# Patient Record
Sex: MALE | Race: BLACK OR AFRICAN AMERICAN | Employment: UNEMPLOYED | URBAN - METROPOLITAN AREA
[De-identification: names, ages, dates, MRNs, and addresses within clinical notes are randomized per-mention and may not be internally consistent; named-entity substitution may affect disease eponyms.]

---

## 2017-03-01 PROBLEM — R35.1 NOCTURIA: Status: ACTIVE | Noted: 2017-03-01

## 2017-03-01 PROBLEM — N39.41 URGE INCONTINENCE: Status: ACTIVE | Noted: 2017-03-01

## 2017-03-01 PROBLEM — E29.1 HYPOGONADISM IN MALE: Status: ACTIVE | Noted: 2017-03-01

## 2018-06-08 ENCOUNTER — APPOINTMENT (OUTPATIENT)
Dept: LAB | Age: 63
End: 2018-06-08

## 2018-06-08 ENCOUNTER — HOSPITAL ENCOUNTER (OUTPATIENT)
Dept: LAB | Age: 63
Discharge: HOME | End: 2018-06-08

## 2018-06-08 PROCEDURE — 99001 SPECIMEN HANDLING PT-LAB: CPT | Performed by: INTERNAL MEDICINE

## 2018-10-04 ENCOUNTER — HOSPITAL ENCOUNTER (OUTPATIENT)
Dept: LAB | Age: 63
Discharge: HOME | End: 2018-10-04

## 2018-10-04 PROCEDURE — 99001 SPECIMEN HANDLING PT-LAB: CPT | Performed by: INTERNAL MEDICINE

## 2019-03-19 ENCOUNTER — HOSPITAL ENCOUNTER (OUTPATIENT)
Dept: LAB | Age: 64
Discharge: HOME | End: 2019-03-19

## 2019-03-19 LAB — XX-LABCORP SPECIMEN COL,LCBCF: NORMAL

## 2019-03-19 PROCEDURE — 99001 SPECIMEN HANDLING PT-LAB: CPT

## 2019-04-16 ENCOUNTER — OFFICE VISIT (OUTPATIENT)
Dept: UROLOGY | Age: 64
End: 2019-04-16

## 2019-04-16 VITALS
HEIGHT: 70 IN | BODY MASS INDEX: 27.49 KG/M2 | OXYGEN SATURATION: 98 % | SYSTOLIC BLOOD PRESSURE: 131 MMHG | WEIGHT: 192 LBS | DIASTOLIC BLOOD PRESSURE: 88 MMHG | HEART RATE: 83 BPM

## 2019-04-16 DIAGNOSIS — N52.9 ERECTILE DYSFUNCTION, UNSPECIFIED ERECTILE DYSFUNCTION TYPE: Primary | ICD-10-CM

## 2019-04-16 LAB
BILIRUB UR QL STRIP: NEGATIVE
GLUCOSE UR-MCNC: NEGATIVE MG/DL
KETONES P FAST UR STRIP-MCNC: NEGATIVE MG/DL
PH UR STRIP: 7 [PH] (ref 4.6–8)
PROT UR QL STRIP: NEGATIVE
SP GR UR STRIP: 1.01 (ref 1–1.03)
UA UROBILINOGEN AMB POC: NORMAL (ref 0.2–1)
URINALYSIS CLARITY POC: CLEAR
URINALYSIS COLOR POC: YELLOW
URINE BLOOD POC: NEGATIVE
URINE LEUKOCYTES POC: NEGATIVE
URINE NITRITES POC: NEGATIVE

## 2019-04-16 RX ORDER — CLONIDINE HYDROCHLORIDE 0.1 MG/1
TABLET ORAL 2 TIMES DAILY
COMMUNITY
End: 2022-07-19

## 2019-04-16 RX ORDER — FUROSEMIDE 20 MG/1
TABLET ORAL DAILY
COMMUNITY
End: 2020-07-21

## 2019-04-16 RX ORDER — PANTOPRAZOLE SODIUM 20 MG/1
20 TABLET, DELAYED RELEASE ORAL DAILY
COMMUNITY
End: 2020-05-06

## 2019-04-16 NOTE — PROGRESS NOTES
Mr. Bayron Lima has a reminder for a \"due or due soon\" health maintenance. I have asked that he contact his primary care provider for follow-up on this health maintenance.

## 2019-04-16 NOTE — PROGRESS NOTES
Lab Results   Component Value Date/Time    Prostate Specific Ag 0.79 06/27/2016 10:29 AM      [address]    Chief Complaint   Patient presents with   [facility]    Erectile Dysfunction       History and Physical    The patient is a pleasant 40-year-old -American male resents with erectile dysfunction. The patient advises that the problem has become progressive over the last 2 years. The patient began with normal libido but had difficulty with achieving and sustaining erection. The patient is now completely incapable of getting an erection and his libido has dropped. The patient has also noted a loss of energy loss of stamina loss of muscle mass. The patient denies any personal or family history of genitourinary issues. Review of the database reveals that the patient was found to have low free and total testosterone levels back in 2017. Apparently, AndroGel was attempted and did not work and the patient never received injectable testosterone.     Past Medical History:   Diagnosis Date    Arthritis     Benign hypertension     Coronary artery disease     Diabetes mellitus ([address]     Enlarged heart     Erectile dysfunction     Frequency of micturition     Hypogonadism in male     Impotence     Incomplete bladder emptying     Musculoskeletal pain     Nocturia     Urgency of micturition     Wrist joint pain      Patient Active Problem List   Diagnosis Code    Chest pain R07.9    Malignant hypertension I10    Syncope R55    Essential hypertension I10    Diabetes mellitus ([address] E11.9    Erectile dysfunction N52.9    Enlarged heart I51.7    Coronary artery disease I25.10    Musculoskeletal pain M79.18    Wrist joint pain M25.539    Arthritis M19.90    Benign hypertension I10    Impotence N52.9    Hypogonadism in male E29.1    Nocturia R35.1    Urge incontinence N39.41    Effusion of knee M25.469     Past Surgical History:   Procedure Laterality Date    HX CARPAL TUNNEL RELEASE      HX HERNIA REPAIR       Current Outpatient Medications   Medication Sig Dispense Refill    cloNIDine HCl (CATAPRES) 0.1 mg tablet Take  by mouth two (2) times a day.  pantoprazole (PROTONIX) 20 mg tablet Take 20 mg by mouth daily.  ammonium lactate (PILLO-HYDROLAC) 5 % lotion Apply  to affected area as needed.  furosemide (LASIX) 20 mg tablet Take  by mouth daily.  labetalol (NORMODYNE) 200 mg tablet TAKE 1 TABLET TWICE A DAY  2    NIFEdipine ER (ADALAT CC) 90 mg ER tablet TAKE 1 TABLET EVERY DAY  2    pioglitazone (ACTOS) 30 mg tablet TAKE 1 TABLET EVERY DAY  2    losartan-hydrochlorothiazide (HYZAAR) 100-25 mg per tablet Take 1 Tab by Mouth Once a Day.  sitaGLIPtin (JANUVIA) 100 mg tablet Take 100 mg by mouth daily.  polyethylene glycol (MIRALAX) 17 gram packet TAKE 17 G EVERY DAY BY ORAL ROUTE.  0    naproxen (NAPROSYN) 500 mg tablet TAKE 1 TABLET TWICE A DAY  0    TRILYTE WITH FLAVOR PACKETS 420 gram solution       metoprolol tartrate (LOPRESSOR) 50 mg tablet TAKE 1 TABLET(S) EVERY DAY BY ORAL ROUTE FOR 30 DAYS.  0    amoxicillin-clavulanate (AUGMENTIN) 500-125 mg per tablet TAKE 1 TABLET(S) EVERY 12 HOURS BY ORAL ROUTE FOR 10 DAYS.  0    testosterone cypionate (DEPOTESTOTERONE CYPIONATE) 200 mg/mL injection 1 mL by IntraMUSCular route every seven (7) days. Max Daily Amount: 200 mg. Fax to [facility] @ [phone] 4 Vial 5    Syringe with Needle, Disp, (SYRINGE 3CC/21GX1\") 3 mL 21 gauge x 1\" syrg 1 Syringe by Does Not Apply route every seven (7) days.  4 Syringe 5    OTHER,NON-FORMULARY, TRI-MIX 60 MCGS PGE/30MG PAPAVERINE/2 MG REGITINE/ML 5 Each 5    Syringe with Needle, Disp, (ALLERGY SYRINGE) 1 mL 27 x 1/2\" syrg For use with Intracavernosal Trimix injections 25 Pen Needle 2    simvastatin (ZOCOR) 20 mg tablet 20 mg.      tamsulosin (FLOMAX) 0.4 mg capsule 0.4 mg.      oxyCODONE-acetaminophen (PERCOCET 7.5) 7.5-325 mg per tablet TAKE 1 TABLET BY MOUTH FOUR TIMES DAILY, AS NEEDED, FOR 30 DAYS  0    testosterone (ANDROGEL) 20.25 mg/1.25 gram (1.62 %) gel Apply 2 Sprays to affected area daily. Max Daily Amount: 40.5 mg. 1 Bottle 5    amLODIPine (NORVASC) 10 mg tablet Take  by mouth daily.  insulin detemir (LEVEMIR FLEXTOUCH) 100 unit/mL (3 mL) inpn by SubCUTAneous route.  metFORMIN (GLUCOPHAGE) 1,000 mg tablet Take 1,000 mg by mouth two (2) times daily (with meals).  traMADol (ULTRAM) 50 mg tablet Take 50 mg by mouth every six (6) hours as needed for Pain.        Allergies   Allergen Reactions    Aspirin Anaphylaxis     Fatal    Lisinopril Cough     Social History     Socioeconomic History    Marital status: UNKNOWN     Spouse name: Not on file    Number of children: Not on file    Years of education: Not on file    Highest education level: Not on file   Occupational History    Not on file   Social Needs    Financial resource strain: Not on file    Food insecurity:     Worry: Not on file     Inability: Not on file    Transportation needs:     Medical: Not on file     Non-medical: Not on file   Tobacco Use    Smoking status: Former Smoker     Packs/day: 0.50     Years: 5.00     Pack years: 2.50     Types: Cigarettes     Last attempt to quit: 1976     Years since quittin.3    Smokeless tobacco: Never Used   Substance and Sexual Activity    Alcohol use: Yes     Comment: occassionally    Drug use: No    Sexual activity: Not Currently   Lifestyle    Physical activity:     Days per week: Not on file     Minutes per session: Not on file    Stress: Not on file   Relationships    Social connections:     Talks on phone: Not on file     Gets together: Not on file     Attends [employer] service: Not on file     Active member of club or organization: Not on file     Attends meetings of clubs or organizations: Not on file     Relationship status: Not on file    Intimate partner violence:     Fear of current or ex partner: Not on file Emotionally abused: Not on file     Physically abused: Not on file     Forced sexual activity: Not on file   Other Topics Concern    Not on file   Social History Narrative    Not on file      Family History   Problem Relation Age of Onset    Diabetes Mother     Hypertension Mother     Heart Failure Mother     High Cholesterol Mother     Stroke Mother     Other Mother         vision problems    Diabetes Father     Hypertension Father     Diabetes Sister     Hypertension Sister     No Known Problems Sister     No Known Problems Brother     HIV/AIDS Sister     Diabetes Maternal Uncle     Hypertension Maternal Uncle                  Visit Vitals  /88 (BP 1 Location: Left arm, BP Patient Position: Supine)   Pulse 83   Ht 5' 10\" (1.778 m)   Wt 192 lb (87.1 kg)   SpO2 98%   BMI 27.55 kg/m²     Physical        Gen: WDWN adult NAD normal male body habitus and hair distribution with no gynecomastia  Head  : normocephalic,  Normal ROM; eyes with normal pupils, EOMs, no masses;  conjunctiva normal  Neck: normal movement,  no evident mass,  No evident adenopathy, trachea midline,  Lungs: no respiratory distress or difficulties  CV:  No evident peripheral swelling  Abd :bowel sounds normal, no masses, tenderness, organomegaly  Flanks   negative to punch percussion  -penis is circumcised and normal.  Scrotal contents reveal normal size and texture of testes. There is an old hernia repair scar on the right side without recurrence. Rectal tone is normal.  Prostate is 20 g smooth and benign    Extremities- no edema, arthritis, deformity, swelling dorsalis pedis and posterior tibial pulses are normal.  Femoral pulses are slightly reduced on the left side but there is no evidence of a bruit.   There is no abdominal bruit  Psych- oriented, no evident anxiety, no cognitive impairment evident    Urine analysis is unremarkable                  Impression/ PLAN  Loss of libido and erectile capability in a patient with laboratory confirmation of hypogonadism a couple of years ago    Plan: Will draw PSA and get a panel that includes free and total testosterone, prolactin, estradiol, luteinizing hormone, follicle-stimulating hormone    1 week after that, we will recheck a free and total testosterone to fulfill criteria for hypogonadism. These will be venipunctures accomplished in the morning. The patient will return after that for discussion            This visit exceeded 30 minutes and >50% was counselling  The patient understands the discussion and plan    PLEASE NOTE:      This document has been produced using voice recognition software.   Unrecognized errors in transcription may be present    Rodrigue Rosario MD

## 2019-04-16 NOTE — PATIENT INSTRUCTIONS
Erectile Dysfunction: Care Instructions  Your Care Instructions    A man has erectile dysfunction (ED) when he routinely can't get or keep an erection that allows satisfactory sex. He may not be able to have an erection at any time. Or he may not be able to have one that is firm enough or lasts long enough to complete intercourse. ED is not the same as having trouble getting an erection now and then. That's common. It happens to most men at some time. ED can be caused by problems with the blood vessels, nerves, or hormones. It can be caused by diabetes, heart disease, and injuries. Nerve disorders, such as multiple sclerosis or Parkinson's disease, can also cause it. ED can also be caused by medicines, alcohol, and tobacco. Or it may be caused by depression, stress, grief, or relationship problems. Follow-up care is a key part of your treatment and safety. Be sure to make and go to all appointments, and call your doctor if you are having problems. It's also a good idea to know your test results and keep a list of the medicines you take. How can you care for yourself at home?   Lifestyle    · Limit alcohol. Have no more than 2 drinks a day.     · Do not smoke. Smoking makes it harder for the blood vessels in the penis to relax and let blood flow in. If you need help quitting, talk to your doctor about stop-smoking programs and medicines. These can increase your chances of quitting for good.     · Do not use cocaine, heroin, or other illegal drugs.     · Try to reduce stress.     · Give yourself time to adjust to change. Changes in your job, family, relationships, home life, and other areas can cause stress. And stress can cause erection problems.    Work with your partner    · Don't assume that you know what your partner likes when it comes to sex. You may be wrong. Talk about what each of you does and does not enjoy.     · Make time outside of the bedroom to talk about your sex life.  If you avoid sex because you are afraid of having erection problems, your partner may worry that you are no longer interested.     · If you and your partner have trouble talking about sex, see a therapist who can help you talk about it. Reading books with your partner about sexual health may also help.     · Relax. Take time for more foreplay. Worrying about your erections may only make things worse. Medicines    · Tell your doctor about all the medicines that you take. ? Some medicines can cause erection problems. ? Some medicines can have dangerous interactions with medicines that are prescribed for ED, including over-the-counter medicines and herbal products.     · Be safe with medicines. Take your medicines exactly as prescribed. Call your doctor if you think you are having a problem with your medicine.     · Talk to your doctor about trying a medicine to help you keep an erection. This could be a medicine such as Viagra, Levitra, or Cialis. If you have a heart problem, ask your doctor if these are safe for you. Do not take these medicines if you take nitroglycerin or other nitrate medicine. When should you call for help? Call your doctor now or seek immediate medical care if:    · You took a medicine for erectile dysfunction and you have an erection that lasts longer than 3 hours.    Watch closely for changes in your health, and be sure to contact your doctor if you have any problems. Where can you learn more? Go to http://carlos-brionna.info/. Enter [id number] [id number] in the search box to learn more about \"Erectile Dysfunction: Care Instructions. \"  Current as of: September 26, 2018  Content Version: 11.9  © 4459-3502 Healthwise, Incorporated. Care instructions adapted under license by [employer] (which disclaims liability or warranty for this information).  If you have questions about a medical condition or this instruction, always ask your healthcare professional. [address] any warranty or liability for your use of this information.

## 2019-04-17 DIAGNOSIS — N52.9 ERECTILE DYSFUNCTION, UNSPECIFIED ERECTILE DYSFUNCTION TYPE: ICD-10-CM

## 2019-04-18 ENCOUNTER — HOSPITAL ENCOUNTER (OUTPATIENT)
Dept: LAB | Age: 64
Discharge: HOME | End: 2019-04-18

## 2019-04-18 LAB
XX-LABCORP SPECIMEN COL,LCBCF: NORMAL
XX-LABCORP SPECIMEN COL,LCBCF: NORMAL

## 2019-04-18 PROCEDURE — 99001 SPECIMEN HANDLING PT-LAB: CPT

## 2019-04-20 LAB
ESTRADIOL SERPL-MCNC: 33.5 PG/ML (ref 7.6–42.6)
FSH SERPL-ACNC: 15.2 MIU/ML (ref 1.5–12.4)
LH SERPL-ACNC: 24.5 MIU/ML (ref 1.7–8.6)
PROLACTIN SERPL-MCNC: 10.7 NG/ML (ref 4–15.2)
PSA SERPL-MCNC: 1.9 NG/ML (ref 0–4)
SPECIMEN STATUS REPORT, ROLRST: NORMAL
TESTOST FREE SERPL-MCNC: 5.1 PG/ML (ref 6.6–18.1)
TESTOST SERPL-MCNC: 450 NG/DL (ref 264–916)

## 2019-04-26 ENCOUNTER — DOCUMENTATION ONLY (OUTPATIENT)
Dept: UROLOGY | Age: 64
End: 2019-04-26

## 2019-04-26 NOTE — PROGRESS NOTES
Patient called and wanted his lab results . I told him Dr. Erick Ojeda has not seen them and I was unable to give those results. I told him when Erick Hess comes back I will ask if results can be given.  Patient understood

## 2019-05-13 ENCOUNTER — OFFICE VISIT (OUTPATIENT)
Dept: UROLOGY | Age: 64
End: 2019-05-13

## 2019-05-13 VITALS
HEART RATE: 76 BPM | BODY MASS INDEX: 27.49 KG/M2 | OXYGEN SATURATION: 93 % | WEIGHT: 192 LBS | HEIGHT: 70 IN | DIASTOLIC BLOOD PRESSURE: 58 MMHG | SYSTOLIC BLOOD PRESSURE: 90 MMHG

## 2019-05-13 DIAGNOSIS — N52.9 ERECTILE DYSFUNCTION, UNSPECIFIED ERECTILE DYSFUNCTION TYPE: Primary | ICD-10-CM

## 2019-05-13 NOTE — PROGRESS NOTES
Lab Results Component Value Date/Time  
 Prostate Specific Ag 1.9 04/18/2019 12:00 AM  
 Prostate Specific Ag 0.79 06/27/2016 10:29 AM  
 NARRATIVE: 
 
She is a 68-year-old -American male who presented with loss of libido, erectile dysfunction, and some suggestion of loss of energy stamina and muscle mass. However, his primary difficulty is his sexual dysfunction. The patient reported a history of low free and total testosterone in 2017 patient failed with AndroGel and testosterone shots were not given. The patient comes now to review his laboratory data Urinalysis is negative PSA acceptable at 1.9 The patient has slight elevation in his follicle-stimulating hormone and significant elevation in his luteinizing hormone but the testosterone done by morning venipuncture was acceptable at 450. However the free testosterone was somewhat low at 5.1. Estradiol levels are normal 
 
 
 
 
 
 
 
 
 
IMPRESSION: 
The patient's laboratory work shows a normal testosterone in the presence of elevated luteinizing hormone but with a low free testosterone his major issue is sexual function and the patient apparently has had some experience or discussion regarding penile injections and urethral suppositories PLAN: 
I am going to check a sex hormone binding globulin level and the patient may need endocrinology evaluation to help with this and also with his diabetes that may be complicating the issue. I have also talked with him about the alternative therapies including injections and urethral suppositories but the patient states that he had had success in the past with Cialis so I am giving him a paper prescription for Cialis 20 mg and alternative access is discussed with the patient The patient will return in 1 month and we will review his success with that medication and his blood work and see whether not we need to proceed with endocrinology evaluation This visit exceeded 15 minutes and greater than 50% was counseling. The patient expresses understanding of the treatment plan and wishes to proceed This dictation used voice recognition software and there may be mistakes.  
 
Lilia Blackwell MD

## 2019-05-13 NOTE — PROGRESS NOTES
Mr. Zoie Neville has a reminder for a \"due or due soon\" health maintenance. I have asked that he contact his primary care provider for follow-up on this health maintenance.

## 2019-05-14 LAB
BILIRUB UR QL STRIP: NEGATIVE
GLUCOSE UR-MCNC: NEGATIVE MG/DL
KETONES P FAST UR STRIP-MCNC: NEGATIVE MG/DL
PH UR STRIP: 5.5 [PH] (ref 4.6–8)
PROT UR QL STRIP: NEGATIVE
SHBG SERPL-SCNC: 79.4 NMOL/L (ref 19.3–76.4)
SP GR UR STRIP: 1.02 (ref 1–1.03)
UA UROBILINOGEN AMB POC: NORMAL (ref 0.2–1)
URINALYSIS CLARITY POC: CLEAR
URINALYSIS COLOR POC: YELLOW
URINE BLOOD POC: NEGATIVE
URINE LEUKOCYTES POC: NEGATIVE
URINE NITRITES POC: NEGATIVE

## 2019-07-16 ENCOUNTER — OFFICE VISIT (OUTPATIENT)
Dept: UROLOGY | Age: 64
End: 2019-07-16

## 2019-07-16 VITALS
OXYGEN SATURATION: 96 % | SYSTOLIC BLOOD PRESSURE: 162 MMHG | HEIGHT: 70 IN | WEIGHT: 193 LBS | BODY MASS INDEX: 27.63 KG/M2 | HEART RATE: 85 BPM | DIASTOLIC BLOOD PRESSURE: 84 MMHG

## 2019-07-16 DIAGNOSIS — N52.9 ERECTILE DYSFUNCTION, UNSPECIFIED ERECTILE DYSFUNCTION TYPE: Primary | ICD-10-CM

## 2019-07-16 LAB
BILIRUB UR QL STRIP: NEGATIVE
GLUCOSE UR-MCNC: NEGATIVE MG/DL
KETONES P FAST UR STRIP-MCNC: NEGATIVE MG/DL
PH UR STRIP: 6 [PH] (ref 4.6–8)
PROT UR QL STRIP: NEGATIVE
SP GR UR STRIP: 1.01 (ref 1–1.03)
UA UROBILINOGEN AMB POC: NORMAL (ref 0.2–1)
URINALYSIS CLARITY POC: CLEAR
URINALYSIS COLOR POC: YELLOW
URINE BLOOD POC: NEGATIVE
URINE LEUKOCYTES POC: NEGATIVE
URINE NITRITES POC: NEGATIVE

## 2019-07-16 NOTE — PATIENT INSTRUCTIONS
Erectile Dysfunction: Care Instructions  Your Care Instructions    A man has erectile dysfunction (ED) when he routinely can't get or keep an erection that allows satisfactory sex. He may not be able to have an erection at any time. Or he may not be able to have one that is firm enough or lasts long enough to complete intercourse. ED is not the same as having trouble getting an erection now and then. That's common. It happens to most men at some time. ED can be caused by problems with the blood vessels, nerves, or hormones. It can be caused by diabetes, heart disease, and injuries. Nerve disorders, such as multiple sclerosis or Parkinson's disease, can also cause it. ED can also be caused by medicines, alcohol, and tobacco. Or it may be caused by depression, stress, grief, or relationship problems. Follow-up care is a key part of your treatment and safety. Be sure to make and go to all appointments, and call your doctor if you are having problems. It's also a good idea to know your test results and keep a list of the medicines you take. How can you care for yourself at home?   Lifestyle    · Limit alcohol. Have no more than 2 drinks a day.     · Do not smoke. Smoking makes it harder for the blood vessels in the penis to relax and let blood flow in. If you need help quitting, talk to your doctor about stop-smoking programs and medicines. These can increase your chances of quitting for good.     · Do not use cocaine, heroin, or other illegal drugs.     · Try to reduce stress.     · Give yourself time to adjust to change. Changes in your job, family, relationships, home life, and other areas can cause stress. And stress can cause erection problems.    Work with your partner    · Don't assume that you know what your partner likes when it comes to sex. You may be wrong. Talk about what each of you does and does not enjoy.     · Make time outside of the bedroom to talk about your sex life.  If you avoid sex because you are afraid of having erection problems, your partner may worry that you are no longer interested.     · If you and your partner have trouble talking about sex, see a therapist who can help you talk about it. Reading books with your partner about sexual health may also help.     · Relax. Take time for more foreplay. Worrying about your erections may only make things worse. Medicines    · Tell your doctor about all the medicines that you take. ? Some medicines can cause erection problems. ? Some medicines can have dangerous interactions with medicines that are prescribed for ED, including over-the-counter medicines and herbal products.     · Be safe with medicines. Take your medicines exactly as prescribed. Call your doctor if you think you are having a problem with your medicine.     · Talk to your doctor about trying a medicine to help you keep an erection. This could be a medicine such as Viagra, Levitra, or Cialis. If you have a heart problem, ask your doctor if these are safe for you. Do not take these medicines if you take nitroglycerin or other nitrate medicine. When should you call for help? Call your doctor now or seek immediate medical care if:    · You took a medicine for erectile dysfunction and you have an erection that lasts longer than 3 hours.    Watch closely for changes in your health, and be sure to contact your doctor if you have any problems. Where can you learn more? Go to http://carlos-brionna.info/. Enter [id number] in the search box to learn more about \"Erectile Dysfunction: Care Instructions. \"  Current as of: September 26, 2018  Content Version: 11.9  © 2203-4379 Healthwise, Incorporated. Care instructions adapted under license by [employer] (which disclaims liability or warranty for this information).  If you have questions about a medical condition or this instruction, always ask your healthcare professional. [address] any warranty or liability for your use of this information.

## 2019-07-16 NOTE — PROGRESS NOTES
Mr. Damon Hernandez has a reminder for a \"due or due soon\" health maintenance. I have asked that he contact his primary care provider for follow-up on this health maintenance.

## 2019-07-16 NOTE — PROGRESS NOTES
Lab Results   Component Value Date/Time    Prostate Specific Ag 1.9 04/18/2019 12:00 AM    Prostate Specific Ag 0.79 06/27/2016 10:29 AM    NARRATIVE:    Patient is a 43-year-old -American male who is being evaluated for erectile dysfunction. The patient has a history of reportedly low testosterone levels and failed with AndroGel. Initial laboratory evaluation showed an elevation in his luteinizing hormone but the testosterone level was acceptable with a low free testosterone. I obtained a sex hormone binding globulin level that was only modestly elevated. The patient has again tried Cialis and has had absolutely no benefit from it and states that his problem is that he is not able to even achieve a full erection much less sustain it            urinalysis is negative        IMPRESSION:  Erectile dysfunction with normal testosterone levels but with elevated luteinizing hormone levels  Failure with Cialis      PLAN:  I have advised the patient that I do not believe the testosterone replacement therapy would provide any benefit for him. While the luteinizing hormone elevation is present, the patient does have a normal testosterone level and I do not currently see where testosterone replacement would be of any benefit. The patient has researched his alternatives and we will try Alden suppositories and have also provided him with literature on vacuum erection device along with full discussion with use and about how to use it to rehabilitate prior to using. The patient is also research the idea about injection therapy and I have suggested that the patient might want to come back in and see Dr. Shania Albert about that should the vacuum erection device and the Alden not provide him with satisfaction  The patient is advised to continue getting his annual prostate surveillance    This visit exceeded 25 minutes and greater than 50% was counseling.   The patient expresses understanding of the treatment plan and wishes to proceed    This dictation used voice recognition software and there may be mistakes.     Ashley Reynaga MD

## 2019-09-17 ENCOUNTER — HOSPITAL ENCOUNTER (OUTPATIENT)
Dept: LAB | Age: 64
Discharge: HOME | End: 2019-09-17

## 2019-09-17 LAB — XX-LABCORP SPECIMEN COL,LCBCF: NORMAL

## 2019-09-17 PROCEDURE — 99001 SPECIMEN HANDLING PT-LAB: CPT

## 2019-12-22 ENCOUNTER — HOSPITAL ENCOUNTER (EMERGENCY)
Age: 64
Discharge: HOME | End: 2019-12-22
Attending: EMERGENCY MEDICINE
Payer: MEDICAID

## 2019-12-22 ENCOUNTER — APPOINTMENT (OUTPATIENT)
Dept: GENERAL RADIOLOGY | Age: 64
End: 2019-12-22
Attending: PHYSICIAN ASSISTANT
Payer: MEDICAID

## 2019-12-22 VITALS
BODY MASS INDEX: 26.92 KG/M2 | TEMPERATURE: 98.9 F | WEIGHT: 188 LBS | SYSTOLIC BLOOD PRESSURE: 172 MMHG | HEART RATE: 96 BPM | HEIGHT: 70 IN | RESPIRATION RATE: 16 BRPM | OXYGEN SATURATION: 97 % | DIASTOLIC BLOOD PRESSURE: 92 MMHG

## 2019-12-22 DIAGNOSIS — R05.9 COUGH: ICD-10-CM

## 2019-12-22 DIAGNOSIS — I10 ESSENTIAL HYPERTENSION: ICD-10-CM

## 2019-12-22 DIAGNOSIS — J06.9 UPPER RESPIRATORY TRACT INFECTION, UNSPECIFIED TYPE: Primary | ICD-10-CM

## 2019-12-22 LAB
FLUAV AG NPH QL IA: NEGATIVE
FLUBV AG NOSE QL IA: NEGATIVE

## 2019-12-22 PROCEDURE — 87804 INFLUENZA ASSAY W/OPTIC: CPT

## 2019-12-22 PROCEDURE — 99283 EMERGENCY DEPT VISIT LOW MDM: CPT

## 2019-12-22 PROCEDURE — 74011250637 HC RX REV CODE- 250/637: Performed by: PHYSICIAN ASSISTANT

## 2019-12-22 PROCEDURE — 71046 X-RAY EXAM CHEST 2 VIEWS: CPT

## 2019-12-22 RX ORDER — CLONIDINE HYDROCHLORIDE 0.1 MG/1
0.1 TABLET ORAL
Status: DISCONTINUED | OUTPATIENT
Start: 2019-12-22 | End: 2019-12-22

## 2019-12-22 RX ORDER — CLONIDINE HYDROCHLORIDE 0.1 MG/1
0.2 TABLET ORAL
Status: COMPLETED | OUTPATIENT
Start: 2019-12-22 | End: 2019-12-22

## 2019-12-22 RX ORDER — LABETALOL 100 MG/1
200 TABLET, FILM COATED ORAL
Status: COMPLETED | OUTPATIENT
Start: 2019-12-22 | End: 2019-12-22

## 2019-12-22 RX ORDER — AMLODIPINE BESYLATE 5 MG/1
10 TABLET ORAL
Status: COMPLETED | OUTPATIENT
Start: 2019-12-22 | End: 2019-12-22

## 2019-12-22 RX ORDER — BENZONATATE 100 MG/1
100 CAPSULE ORAL
Qty: 20 CAP | Refills: 0 | Status: SHIPPED | OUTPATIENT
Start: 2019-12-22 | End: 2019-12-29

## 2019-12-22 RX ADMIN — LABETALOL HYDROCHLORIDE 200 MG: 100 TABLET, FILM COATED ORAL at 22:00

## 2019-12-22 RX ADMIN — CLONIDINE HYDROCHLORIDE 0.2 MG: 0.1 TABLET ORAL at 21:59

## 2019-12-22 RX ADMIN — AMLODIPINE BESYLATE 10 MG: 5 TABLET ORAL at 22:00

## 2019-12-23 NOTE — ED TRIAGE NOTES
Patient comes in stating that he has been feeling pretty bad for about a week, denies any vomiting or diarrhea, states that he has been fatigued. Patient also states that he has been coughing and having chills.

## 2019-12-23 NOTE — ED PROVIDER NOTES
EMERGENCY DEPARTMENT HISTORY AND PHYSICAL EXAM    Date: 12/22/2019  Patient Name: Jennifer Garces    History of Presenting Illness     Chief Complaint   Patient presents with    Cough    Fatigue         History Provided By: Patient    Chief Complaint: cough  Duration: 1 Weeks  Timing:  Gradual  Location: chest  Quality: Aching  Severity: Mild  Modifying Factors: none  Associated Symptoms: fatigue, chills      HPI: Jennifer Garces is a 75 y.o. male with a PMH of Diabetes, impotence, hypertension, arthritis, CAD, nocturia who presents to the ER complaining of cough, fatigue and chills. Patient states his symptoms began about 1 week ago and continued to persist.  He has been taking NyQuil frequently for his symptoms with no relief. Patient reports a productive cough with green phlegm. Denied any associated fevers. He did get the flu vaccine this season however did not get the pneumonia vaccine. He denies feeling short of breath or having chest pain. He has no other symptoms or complaints. Patient reports he has not taken his blood pressure medication today. PCP: Beena Valenzuela MD    Current Outpatient Medications   Medication Sig Dispense Refill    benzonatate (TESSALON PERLES) 100 mg capsule Take 1 Cap by mouth three (3) times daily as needed for Cough for up to 7 days. 20 Cap 0    LANTUS SOLOSTAR U-100 INSULIN 100 unit/mL (3 mL) inpn INJECT 40 UNIT(S) EVERY DAY BY SUBCUTANEOUS ROUTE.  0    insulin glargine (LANTUS SOLOSTAR U-100 INSULIN) 100 unit/mL (3 mL) inpn Lantus Solostar U-100 Insulin 100 unit/mL (3 mL) subcutaneous pen      Diabetic Supplies, Miscellan. (INJECT-EASE AUTOMATIC INJECTOR) [facility] FOR USE WITH INTRACAVERNOSAL INJECTIONS. 1 Each 1    tadalafil (CIALIS) 5 mg tablet Take 1 Tab by mouth daily. 30 Tab 5    OTHER,NON-FORMULARY, 0.5 mL by IntraCAVernosal route as needed (For ED). Please dispense a 5 ml amount of of Trimix consisting of PGE 60 mcg/Papaverine 30 mg/Phentolamine 2 mg per ml.   Patient is to bring to office for dosing 5 Each 5    Syringe with Needle,Disp, Tray (ALLERGIST TRAY 1CC 27GX1/2\") 1 mL 27 x 1/2\" tray 1 Each by Does Not Apply route as needed (For ED). Please dispense one tray of allergy syringes. To be used as directed. 25 Each 5    CIALIS 5 mg tablet Take 1 Tab by mouth daily. For ED. Please dispense through compounding pharmacy. 90 Tab 3    cloNIDine HCl (CATAPRES) 0.1 mg tablet Take  by mouth two (2) times a day.  pantoprazole (PROTONIX) 20 mg tablet Take 20 mg by mouth daily.  furosemide (LASIX) 20 mg tablet Take  by mouth daily.  polyethylene glycol (MIRALAX) 17 gram packet TAKE 17 G EVERY DAY BY ORAL ROUTE.  0    labetalol (NORMODYNE) 200 mg tablet TAKE 1 TABLET TWICE A DAY  2    naproxen (NAPROSYN) 500 mg tablet TAKE 1 TABLET TWICE A DAY  0    TRILYTE WITH FLAVOR PACKETS 420 gram solution       NIFEdipine ER (ADALAT CC) 90 mg ER tablet TAKE 1 TABLET EVERY DAY  2    metoprolol tartrate (LOPRESSOR) 50 mg tablet TAKE 1 TABLET(S) EVERY DAY BY ORAL ROUTE FOR 30 DAYS.  0    Syringe with Needle, Disp, (SYRINGE 3CC/21GX1\") 3 mL 21 gauge x 1\" syrg 1 Syringe by Does Not Apply route every seven (7) days. 4 Syringe 5    OTHER,NON-FORMULARY, TRI-MIX 60 MCGS PGE/30MG PAPAVERINE/2 MG REGITINE/ML 5 Each 5    Syringe with Needle, Disp, (ALLERGY SYRINGE) 1 mL 27 x 1/2\" syrg For use with Intracavernosal Trimix injections 25 Pen Needle 2    tamsulosin (FLOMAX) 0.4 mg capsule 0.4 mg.      oxyCODONE-acetaminophen (PERCOCET 7.5) 7.5-325 mg per tablet TAKE 1 TABLET BY MOUTH FOUR TIMES DAILY, AS NEEDED, FOR 30 DAYS  0    losartan-hydrochlorothiazide (HYZAAR) 100-25 mg per tablet Take 1 Tab by Mouth Once a Day.  amLODIPine (NORVASC) 10 mg tablet Take  by mouth daily.  sitaGLIPtin (JANUVIA) 100 mg tablet Take 100 mg by mouth daily.  insulin detemir (LEVEMIR FLEXTOUCH) 100 unit/mL (3 mL) inpn by SubCUTAneous route.       metFORMIN (GLUCOPHAGE) 1,000 mg tablet Take 1,000 mg by mouth two (2) times daily (with meals). Past History     Past Medical History:  Past Medical History:   Diagnosis Date    Arthritis     Benign hypertension     Coronary artery disease     Diabetes mellitus ([address]     Enlarged heart     Erectile dysfunction     Frequency of micturition     Hypogonadism in male     Impotence     Incomplete bladder emptying     Musculoskeletal pain     Nocturia     Urgency of micturition     Wrist joint pain        Past Surgical History:  Past Surgical History:   Procedure Laterality Date    HX CARPAL TUNNEL RELEASE      HX HERNIA REPAIR         Family History:  Family History   Problem Relation Age of Onset    Diabetes Mother     Hypertension Mother     Heart Failure Mother     High Cholesterol Mother     Stroke Mother     Other Mother         vision problems    Diabetes Father     Hypertension Father     Diabetes Sister     HIV/AIDS Sister     Hypertension Sister     HIV/AIDS Sister     No Known Problems Brother     HIV/AIDS Sister     Diabetes Maternal Uncle     Hypertension Maternal Uncle        Social History:  Social History     Tobacco Use    Smoking status: Former Smoker     Packs/day: 0.50     Years: 5.00     Pack years: 2.50     Types: Cigarettes     Last attempt to quit: 1976     Years since quittin.0    Smokeless tobacco: Never Used   Substance Use Topics    Alcohol use: Yes     Comment: occassionally    Drug use: No       Allergies: Allergies   Allergen Reactions    Aspirin Anaphylaxis     Fatal    Lisinopril Cough         Review of Systems   Review of Systems   Constitutional: Positive for chills and fatigue. Negative for fever. HENT: Negative. Negative for sore throat. Eyes: Negative. Respiratory: Positive for cough. Negative for shortness of breath. Cardiovascular: Negative for chest pain and palpitations. Gastrointestinal: Negative for abdominal pain, nausea and vomiting.    Genitourinary: Negative for dysuria. Musculoskeletal: Negative. Skin: Negative. Neurological: Negative for dizziness, weakness, light-headedness and headaches. Psychiatric/Behavioral: Negative. All other systems reviewed and are negative. Physical Exam     Vitals:    12/22/19 2013 12/22/19 2018 12/22/19 2138   BP:  (!) 218/133 (!) 211/122   Pulse: 96     Resp: 16     Temp: 98.9 °F (37.2 °C)     SpO2: 97%     Weight: 85.3 kg (188 lb)     Height: 5' 10\" (1.778 m)       Physical Exam  Vitals signs and nursing note reviewed. Constitutional:       General: He is not in acute distress. Appearance: He is well-developed. He is not toxic-appearing. HENT:      Head: Normocephalic and atraumatic. Right Ear: Tympanic membrane, ear canal and external ear normal.      Left Ear: Tympanic membrane, ear canal and external ear normal.      Nose: Mucosal edema and rhinorrhea present. Mouth/Throat:      Mouth: Mucous membranes are moist.      Pharynx: Oropharynx is clear. Eyes:      General: No scleral icterus. Conjunctiva/sclera: Conjunctivae normal.      Pupils: Pupils are equal, round, and reactive to light. Neck:      Musculoskeletal: Normal range of motion and neck supple. Vascular: No JVD. Trachea: No tracheal deviation. Cardiovascular:      Rate and Rhythm: Normal rate and regular rhythm. Heart sounds: Normal heart sounds. Pulmonary:      Effort: Pulmonary effort is normal. No respiratory distress. Breath sounds: Normal breath sounds. No wheezing. Abdominal:      General: Bowel sounds are normal.      Palpations: Abdomen is soft. Musculoskeletal: Normal range of motion. Skin:     General: Skin is warm and dry. Neurological:      Mental Status: He is alert and oriented to person, place, and time. GCS: GCS eye subscore is 4. GCS verbal subscore is 5. GCS motor subscore is 6.       Gait: Gait normal.           Diagnostic Study Results     Labs -     Recent Results (from the past 12 hour(s))   INFLUENZA A & B AG (RAPID TEST)    Collection Time: 12/22/19  8:21 PM   Result Value Ref Range    Influenza A Antigen NEGATIVE  NEG      Influenza B Antigen NEGATIVE  NEG         Radiologic Studies -   XR CHEST PA LAT    (Results Pending)     CT Results  (Last 48 hours)    None        CXR Results  (Last 48 hours)    None            Medical Decision Making   I am the first provider for this patient. I reviewed the vital signs, available nursing notes, past medical history, past surgical history, family history and social history. Vital Signs-Reviewed the patient's vital signs. Records Reviewed: Nursing Notes and Old Medical Records     554 PM  68-year-old male who presents the ER complaining of persistent cough, fatigue and chills. Symptom onset about 1 week ago. Has been taking NyQuil and DayQuil regularly for his symptoms with minimal relief. Patient also reports missing his blood pressure medication dose today. Denied any associated fevers. No recent sick contacts. He did get the flu vaccine this year. Productive cough with yellow phlegm. Nontoxic in appearance on exam.  Flu swab negative. Chest x-ray with no acute process per my interpretation. Discussed all results with patient. Advised him to discontinue taking NyQuil or any other cough cold medications due to contributing to his elevated blood pressure and advised to only take Coricidin HBP. Will have patient follow-up with his primary care provider. Given a dose of his hypertension medications here in the ED. No clinical indication for further imaging or testing at this time. Patient stable for discharge. All questions answered and patient in agreement with plan of care. Will plan for discharge. Karen Patino PA-C       Disposition:  discharged    DISCHARGE NOTE:       Care plan outlined and precautions discussed. Patient has no new complaints, changes, or physical findings.   Results of imaging, labs were reviewed with the patient. All medications were reviewed with the patient; will d/c home with tessalon. All of pt's questions and concerns were addressed. Patient was instructed and agrees to follow up with pcp, as well as to return to the ED upon further deterioration. Patient is ready to go home. Follow-up Information     Follow up With Specialties Details Why [facility] EMERGENCY DEPT Emergency Medicine  If symptoms worsen [address] [place] [address]    Rod Garnica MD Pediatrics Call in 1 day ER follow up for cough, high blood pressure. Try taking CORICIDIN HBP for cold symptoms; no more Nyquil [address]  [phone]            Current Discharge Medication List      START taking these medications    Details   benzonatate (TESSALON PERLES) 100 mg capsule Take 1 Cap by mouth three (3) times daily as needed for Cough for up to 7 days. Qty: 20 Cap, Refills: 0         CONTINUE these medications which have NOT CHANGED    Details   !! LANTUS SOLOSTAR U-100 INSULIN 100 unit/mL (3 mL) inpn INJECT 40 UNIT(S) EVERY DAY BY SUBCUTANEOUS ROUTE. Refills: 0      !! insulin glargine (LANTUS SOLOSTAR U-100 INSULIN) 100 unit/mL (3 mL) inpn Lantus Solostar U-100 Insulin 100 unit/mL (3 mL) subcutaneous pen      Diabetic Supplies, Miscellan. (INJECT-EASE AUTOMATIC INJECTOR) [facility] [place] FOR USE WITH INTRACAVERNOSAL INJECTIONS. Qty: 1 Each, Refills: 1      !! tadalafil (CIALIS) 5 mg tablet Take 1 Tab by mouth daily. Qty: 30 Tab, Refills: 5      !! OTHER,NON-FORMULARY, 0.5 mL by IntraCAVernosal route as needed (For ED). Please dispense a 5 ml amount of of Trimix consisting of PGE 60 mcg/Papaverine 30 mg/Phentolamine 2 mg per ml. Patient is to bring to office for dosing  Qty: 5 Each, Refills: 5      Syringe with Needle,Disp, Tray (ALLERGIST TRAY 1CC 27GX1/2\") 1 mL 27 x 1/2\" tray 1 Each by Does Not Apply route as needed (For ED).  Please dispense one tray of allergy syringes. To be used as directed. Qty: 25 Each, Refills: 5      !! CIALIS 5 mg tablet Take 1 Tab by mouth daily. For ED. Please dispense through compounding pharmacy. Qty: 90 Tab, Refills: 3    Associated Diagnoses: Erectile dysfunction of organic origin      cloNIDine HCl (CATAPRES) 0.1 mg tablet Take  by mouth two (2) times a day. pantoprazole (PROTONIX) 20 mg tablet Take 20 mg by mouth daily. furosemide (LASIX) 20 mg tablet Take  by mouth daily. polyethylene glycol (MIRALAX) 17 gram packet TAKE 17 G EVERY DAY BY ORAL ROUTE. Refills: 0      labetalol (NORMODYNE) 200 mg tablet TAKE 1 TABLET TWICE A DAY  Refills: 2      naproxen (NAPROSYN) 500 mg tablet TAKE 1 TABLET TWICE A DAY  Refills: 0      TRILYTE WITH FLAVOR PACKETS 420 gram solution       NIFEdipine ER (ADALAT CC) 90 mg ER tablet TAKE 1 TABLET EVERY DAY  Refills: 2      metoprolol tartrate (LOPRESSOR) 50 mg tablet TAKE 1 TABLET(S) EVERY DAY BY ORAL ROUTE FOR 30 DAYS. Refills: 0      !! Syringe with Needle, Disp, (SYRINGE 3CC/21GX1\") 3 mL 21 gauge x 1\" syrg 1 Syringe by Does Not Apply route every seven (7) days. Qty: 4 Syringe, Refills: 5      !! OTHER,NON-FORMULARY, TRI-MIX 60 MCGS PGE/30MG PAPAVERINE/2 MG REGITINE/ML  Qty: 5 Each, Refills: 5      !! Syringe with Needle, Disp, (ALLERGY SYRINGE) 1 mL 27 x 1/2\" syrg For use with Intracavernosal Trimix injections  Qty: 25 Pen Needle, Refills: 2      tamsulosin (FLOMAX) 0.4 mg capsule 0.4 mg. Associated Diagnoses: Erectile dysfunction, unspecified erectile dysfunction type; Hypogonadism in male; Nocturia      oxyCODONE-acetaminophen (PERCOCET 7.5) 7.5-325 mg per tablet TAKE 1 TABLET BY MOUTH FOUR TIMES DAILY, AS NEEDED, FOR 30 DAYS  Refills: 0    Associated Diagnoses: Erectile dysfunction, unspecified erectile dysfunction type; Hypogonadism in male; Nocturia      losartan-hydrochlorothiazide (HYZAAR) 100-25 mg per tablet Take 1 Tab by Mouth Once a Day.       amLODIPine (NORVASC) 10 mg tablet Take  by mouth daily. sitaGLIPtin (JANUVIA) 100 mg tablet Take 100 mg by mouth daily. insulin detemir (LEVEMIR FLEXTOUCH) 100 unit/mL (3 mL) inpn by SubCUTAneous route. metFORMIN (GLUCOPHAGE) 1,000 mg tablet Take 1,000 mg by mouth two (2) times daily (with meals). !! - Potential duplicate medications found. Please discuss with provider. Provider Notes (Medical Decision Making):     Procedures:  Procedures        Diagnosis     Clinical Impression:   1. Upper respiratory tract infection, unspecified type    2. Cough    3.  Essential hypertension

## 2020-01-21 ENCOUNTER — HOSPITAL ENCOUNTER (OUTPATIENT)
Dept: LAB | Age: 65
Discharge: HOME | End: 2020-01-21
Payer: MEDICAID

## 2020-01-21 LAB
ALBUMIN SERPL-MCNC: 3.4 G/DL (ref 3.4–5)
ALBUMIN/GLOB SERPL: 0.8 {RATIO} (ref 0.8–1.7)
ALP SERPL-CCNC: 148 U/L (ref 45–117)
ALT SERPL-CCNC: 63 U/L (ref 16–61)
ANION GAP SERPL CALC-SCNC: 5 MMOL/L (ref 3–18)
AST SERPL-CCNC: 85 U/L (ref 10–38)
BASOPHILS # BLD: 0 K/UL (ref 0–0.1)
BASOPHILS NFR BLD: 0 % (ref 0–2)
BILIRUB SERPL-MCNC: 0.7 MG/DL (ref 0.2–1)
BUN SERPL-MCNC: 17 MG/DL (ref 7–18)
BUN/CREAT SERPL: 16 (ref 12–20)
CALCIUM SERPL-MCNC: 8.5 MG/DL (ref 8.5–10.1)
CHLORIDE SERPL-SCNC: 104 MMOL/L (ref 100–111)
CHOLEST SERPL-MCNC: 161 MG/DL
CO2 SERPL-SCNC: 31 MMOL/L (ref 21–32)
CREAT SERPL-MCNC: 1.08 MG/DL (ref 0.6–1.3)
DIFFERENTIAL METHOD BLD: ABNORMAL
EOSINOPHIL # BLD: 0.1 K/UL (ref 0–0.4)
EOSINOPHIL NFR BLD: 1 % (ref 0–5)
ERYTHROCYTE [DISTWIDTH] IN BLOOD BY AUTOMATED COUNT: 12.5 % (ref 11.6–14.5)
GLOBULIN SER CALC-MCNC: 4.3 G/DL (ref 2–4)
GLUCOSE SERPL-MCNC: 122 MG/DL (ref 74–99)
HBA1C MFR BLD: 7.2 % (ref 4.2–5.6)
HCT VFR BLD AUTO: 38.2 % (ref 36–48)
HDLC SERPL-MCNC: 77 MG/DL (ref 40–60)
HDLC SERPL: 2.1 {RATIO} (ref 0–5)
HGB BLD-MCNC: 13 G/DL (ref 13–16)
LDLC SERPL CALC-MCNC: 59.4 MG/DL (ref 0–100)
LIPID PROFILE,FLP: ABNORMAL
LYMPHOCYTES # BLD: 1.3 K/UL (ref 0.9–3.6)
LYMPHOCYTES NFR BLD: 21 % (ref 21–52)
MCH RBC QN AUTO: 31.3 PG (ref 24–34)
MCHC RBC AUTO-ENTMCNC: 34 G/DL (ref 31–37)
MCV RBC AUTO: 91.8 FL (ref 74–97)
MONOCYTES # BLD: 0.6 K/UL (ref 0.05–1.2)
MONOCYTES NFR BLD: 9 % (ref 3–10)
NEUTS SEG # BLD: 4.2 K/UL (ref 1.8–8)
NEUTS SEG NFR BLD: 69 % (ref 40–73)
PLATELET # BLD AUTO: 173 K/UL (ref 135–420)
PMV BLD AUTO: 11.7 FL (ref 9.2–11.8)
POTASSIUM SERPL-SCNC: 3.1 MMOL/L (ref 3.5–5.5)
PROT SERPL-MCNC: 7.7 G/DL (ref 6.4–8.2)
RBC # BLD AUTO: 4.16 M/UL (ref 4.7–5.5)
SODIUM SERPL-SCNC: 140 MMOL/L (ref 136–145)
TRIGL SERPL-MCNC: 123 MG/DL (ref ?–150)
VLDLC SERPL CALC-MCNC: 24.6 MG/DL
WBC # BLD AUTO: 6.1 K/UL (ref 4.6–13.2)

## 2020-01-21 PROCEDURE — 83036 HEMOGLOBIN GLYCOSYLATED A1C: CPT

## 2020-01-21 PROCEDURE — 80053 COMPREHEN METABOLIC PANEL: CPT

## 2020-01-21 PROCEDURE — 80061 LIPID PANEL: CPT

## 2020-01-21 PROCEDURE — 80307 DRUG TEST PRSMV CHEM ANLYZR: CPT

## 2020-01-21 PROCEDURE — 36415 COLL VENOUS BLD VENIPUNCTURE: CPT

## 2020-01-21 PROCEDURE — 85025 COMPLETE CBC W/AUTO DIFF WBC: CPT

## 2020-01-24 LAB
AMPHETAMINES SERPL QL SCN: NEGATIVE NG/ML
BARBITURATES SERPL QL SCN: NEGATIVE UG/ML
CANNABINOIDS SERPL QL SCN: NEGATIVE NG/ML
COCAINE+BZE SERPL QL SCN: NEGATIVE NG/ML
OPIATES SERPL QL SCN: NEGATIVE NG/ML
OXYCODONE, 790407: NEGATIVE NG/ML
PCP SERPL QL SCN: NEGATIVE NG/ML

## 2020-06-16 ENCOUNTER — HOSPITAL ENCOUNTER (OUTPATIENT)
Dept: LAB | Age: 65
Discharge: HOME | End: 2020-06-16
Payer: MEDICAID

## 2020-06-16 LAB
ALBUMIN SERPL-MCNC: 3.4 G/DL (ref 3.4–5)
ALBUMIN/GLOB SERPL: 0.7 {RATIO} (ref 0.8–1.7)
ALP SERPL-CCNC: 120 U/L (ref 45–117)
ALT SERPL-CCNC: 58 U/L (ref 16–61)
ANION GAP SERPL CALC-SCNC: 6 MMOL/L (ref 3–18)
AST SERPL-CCNC: 53 U/L (ref 10–38)
BASOPHILS # BLD: 0 K/UL (ref 0–0.1)
BASOPHILS NFR BLD: 0 % (ref 0–2)
BILIRUB SERPL-MCNC: 0.5 MG/DL (ref 0.2–1)
BUN SERPL-MCNC: 26 MG/DL (ref 7–18)
BUN/CREAT SERPL: 22 (ref 12–20)
CALCIUM SERPL-MCNC: 8.4 MG/DL (ref 8.5–10.1)
CHLORIDE SERPL-SCNC: 94 MMOL/L (ref 100–111)
CHOLEST SERPL-MCNC: 174 MG/DL
CO2 SERPL-SCNC: 31 MMOL/L (ref 21–32)
CREAT SERPL-MCNC: 1.16 MG/DL (ref 0.6–1.3)
DIFFERENTIAL METHOD BLD: ABNORMAL
EOSINOPHIL # BLD: 0 K/UL (ref 0–0.4)
EOSINOPHIL NFR BLD: 0 % (ref 0–5)
ERYTHROCYTE [DISTWIDTH] IN BLOOD BY AUTOMATED COUNT: 12.6 % (ref 11.6–14.5)
EST. AVERAGE GLUCOSE BLD GHB EST-MCNC: 166 MG/DL
GLOBULIN SER CALC-MCNC: 4.6 G/DL (ref 2–4)
GLUCOSE SERPL-MCNC: 261 MG/DL (ref 74–99)
HBA1C MFR BLD: 7.4 % (ref 4.2–5.6)
HCT VFR BLD AUTO: 35.8 % (ref 36–48)
HDLC SERPL-MCNC: 103 MG/DL (ref 40–60)
HDLC SERPL: 1.7 {RATIO} (ref 0–5)
HGB BLD-MCNC: 12.4 G/DL (ref 13–16)
LDLC SERPL CALC-MCNC: 52.8 MG/DL (ref 0–100)
LIPID PROFILE,FLP: ABNORMAL
LYMPHOCYTES # BLD: 0.9 K/UL (ref 0.9–3.6)
LYMPHOCYTES NFR BLD: 7 % (ref 21–52)
MCH RBC QN AUTO: 31.8 PG (ref 24–34)
MCHC RBC AUTO-ENTMCNC: 34.6 G/DL (ref 31–37)
MCV RBC AUTO: 91.8 FL (ref 74–97)
MONOCYTES # BLD: 0.8 K/UL (ref 0.05–1.2)
MONOCYTES NFR BLD: 7 % (ref 3–10)
NEUTS SEG # BLD: 10.3 K/UL (ref 1.8–8)
NEUTS SEG NFR BLD: 86 % (ref 40–73)
PLATELET # BLD AUTO: 147 K/UL (ref 135–420)
PMV BLD AUTO: 12 FL (ref 9.2–11.8)
POTASSIUM SERPL-SCNC: 3.5 MMOL/L (ref 3.5–5.5)
PROT SERPL-MCNC: 8 G/DL (ref 6.4–8.2)
RBC # BLD AUTO: 3.9 M/UL (ref 4.7–5.5)
SODIUM SERPL-SCNC: 131 MMOL/L (ref 136–145)
TRIGL SERPL-MCNC: 91 MG/DL (ref ?–150)
VLDLC SERPL CALC-MCNC: 18.2 MG/DL
WBC # BLD AUTO: 12 K/UL (ref 4.6–13.2)

## 2020-06-16 PROCEDURE — 80307 DRUG TEST PRSMV CHEM ANLYZR: CPT

## 2020-06-16 PROCEDURE — 36415 COLL VENOUS BLD VENIPUNCTURE: CPT

## 2020-06-16 PROCEDURE — 80053 COMPREHEN METABOLIC PANEL: CPT

## 2020-06-16 PROCEDURE — 80061 LIPID PANEL: CPT

## 2020-06-16 PROCEDURE — 85025 COMPLETE CBC W/AUTO DIFF WBC: CPT

## 2020-06-16 PROCEDURE — 83036 HEMOGLOBIN GLYCOSYLATED A1C: CPT

## 2020-06-30 ENCOUNTER — APPOINTMENT (OUTPATIENT)
Dept: GENERAL RADIOLOGY | Age: 65
End: 2020-06-30
Attending: EMERGENCY MEDICINE
Payer: MEDICAID

## 2020-06-30 ENCOUNTER — HOSPITAL ENCOUNTER (EMERGENCY)
Age: 65
Discharge: HOME | End: 2020-06-30
Attending: EMERGENCY MEDICINE | Admitting: EMERGENCY MEDICINE
Payer: MEDICAID

## 2020-06-30 VITALS
HEART RATE: 87 BPM | OXYGEN SATURATION: 98 % | BODY MASS INDEX: 25.77 KG/M2 | SYSTOLIC BLOOD PRESSURE: 167 MMHG | WEIGHT: 180 LBS | DIASTOLIC BLOOD PRESSURE: 98 MMHG | RESPIRATION RATE: 16 BRPM | TEMPERATURE: 97.4 F | HEIGHT: 70 IN

## 2020-06-30 DIAGNOSIS — J06.9 ACUTE URI: Primary | ICD-10-CM

## 2020-06-30 PROCEDURE — 99283 EMERGENCY DEPT VISIT LOW MDM: CPT

## 2020-06-30 PROCEDURE — 71045 X-RAY EXAM CHEST 1 VIEW: CPT

## 2020-06-30 PROCEDURE — 87635 SARS-COV-2 COVID-19 AMP PRB: CPT

## 2020-06-30 RX ORDER — METOCLOPRAMIDE 10 MG/1
10 TABLET ORAL
Qty: 12 TAB | Refills: 0 | Status: SHIPPED | OUTPATIENT
Start: 2020-06-30 | End: 2020-07-21

## 2020-06-30 NOTE — LETTER
NOTIFICATION RETURN TO WORK / SCHOOL 
 
6/30/2020 12:13 PM 
 
Mr. Yaquelin [facility], [address] [facility] [id number] To Whom It May Concern: 
 
Bhavik Melgoza is currently under the care of [facility] EMERGENCY DEPT. He may return to work after his coronavirus (COVID-19) testing is negative -- test results should be back within 2 to 5 days. If there are questions or concerns please have the patient contact our office.  
 
 
 
Sincerely, 
 
 
Rey Abrams MD
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
## 2020-06-30 NOTE — DISCHARGE INSTRUCTIONS
Patient Education        You were tested for coronavirus/COVID-19 today. You should self quarantine until your results are back, which will be approximately 2 to 5 days from now. Learning About Coronavirus [phone])  Coronavirus [phone]): Overview  What is coronavirus (COVID-19)? The coronavirus disease (COVID-19) is caused by a virus. It is an illness that was first found in [place], [place], in December 2019. It has since spread worldwide. The virus can cause fever, cough, and trouble breathing. In severe cases, it can cause pneumonia and make it hard to breathe without help. It can cause death. Coronaviruses are a large group of viruses. They cause the common cold. They also cause more serious illnesses like Middle East respiratory syndrome (MERS) and severe acute respiratory syndrome (SARS). COVID-19 is caused by a novel coronavirus. That means it's a new type that has not been seen in people before. This virus spreads person-to-person through droplets from coughing and sneezing. It can also spread when you are close to someone who is infected. And it can spread when you touch something that has the virus on it, such as a doorknob or a tabletop. What can you do to protect yourself from coronavirus (COVID-19)? The best way to protect yourself from getting sick is to:  · Avoid areas where there is an outbreak. · Avoid contact with people who may be infected. · Wash your hands often with soap or alcohol-based hand sanitizers. · Avoid crowds and try to stay at least 6 feet away from other people. · Wash your hands often, especially after you cough or sneeze. Use soap and water, and scrub for at least 20 seconds. If soap and water aren't available, use an alcohol-based hand . · Avoid touching your mouth, nose, and eyes. What can you do to avoid spreading the virus to others? To help avoid spreading the virus to others:  · Cover your mouth with a tissue when you cough or sneeze.  Then throw the tissue in the trash. · Use a disinfectant to clean things that you touch often. · Wear a cloth face cover if you have to go to public areas. · Stay home if you are sick or have been exposed to the virus. Don't go to school, work, or public areas. And don't use public transportation, ride-shares, or taxis unless you have no choice. · If you are sick:  ? Leave your home only if you need to get medical care. But call the doctor's office first so they know you're coming. And wear a face cover. ? Wear the face cover whenever you're around other people. It can help stop the spread of the virus when you cough or sneeze. ? Clean and disinfect your home every day. Use household  and disinfectant wipes or sprays. Take special care to clean things that you grab with your hands. These include doorknobs, remote controls, phones, and handles on your refrigerator and microwave. And don't forget countertops, tabletops, bathrooms, and computer keyboards. When to call for help  [id number] anytime you think you may need emergency care. For example, call if:  · You have severe trouble breathing. (You can't talk at all.)  · You have constant chest pain or pressure. · You are severely dizzy or lightheaded. · You are confused or can't think clearly. · Your face and lips have a blue color. · You pass out (lose consciousness) or are very hard to wake up. Call your doctor now if you develop symptoms such as:  · Shortness of breath. · Fever. · Cough. If you need to get care, call ahead to the doctor's office for instructions before you go. Make sure you wear a face cover to prevent exposing other people to the virus. Where can you get the latest information? The following health organizations are tracking and studying this virus. Their websites contain the most up-to-date information. Eduin Crawford also learn what to do if you think you may have been exposed to the virus. · [place]  Centers for Disease Control and Prevention (CDC): The CDC provides updated news about the disease and travel advice. The website also tells you how to prevent the spread of infection. www.cdc.gov  · World Health Organization [facility]): WHO offers information about the virus outbreaks. WHO also has travel advice. www.who.int  Current as of: May 8, 2020               Content Version: 12.5  © 2006-2020 [employer]. Care instructions adapted under license by [employer] (which disclaims liability or warranty for this information). If you have questions about a medical condition or this instruction, always ask your healthcare professional. [place] [address] any warranty or liability for your use of this information. Patient Education        Coronavirus ([id number]): Care Instructions  Overview  The coronavirus disease (COVID-19) is caused by a virus. Symptoms may include a fever, a cough, and shortness of breath. It mainly spreads person-to-person through droplets from coughing and sneezing. The virus also can spread when people are in close contact with someone who is infected. Most people have mild symptoms and can take care of themselves at home. If their symptoms get worse, they may need care in a hospital. There is no medicine to fight the virus. It's important to not spread the virus to others. If you have COVID-19, wear a face cover anytime you are around other people. You need to isolate yourself while you are sick. Your doctor or local public health official will tell you when you no longer need to be isolated. Leave your home only if you need to get medical care. Follow-up care is a key part of your treatment and safety. Be sure to make and go to all appointments, and call your doctor if you are having problems. It's also a good idea to know your test results and keep a list of the medicines you take. How can you care for yourself at home? · Get extra rest. It can help you feel better.   · Drink plenty of fluids. This helps replace fluids lost from fever. Fluids also help ease a scratchy throat. Water, soup, fruit juice, and hot tea with lemon are good choices. · Take acetaminophen (such as Tylenol) to reduce a fever. It may also help with muscle aches. Read and follow all instructions on the label. · Sponge your body with lukewarm water to help with fever. Don't use cold water or ice. · Use petroleum jelly on sore skin. This can help if the skin around your nose and lips becomes sore from rubbing a lot with tissues. Tips for isolation  · Wear a cloth face cover when you are around other people. It can help stop the spread of the virus when you cough or sneeze. · Limit contact with people in your home. If possible, stay in a separate bedroom and use a separate bathroom. · If you have to leave home, avoid crowds and try to stay at least 6 feet away from other people. · Avoid contact with pets and other animals. · Cover your mouth and nose with a tissue when you cough or sneeze. Then throw it in the trash right away. · Wash your hands often, especially after you cough or sneeze. Use soap and water, and scrub for at least 20 seconds. If soap and water aren't available, use an alcohol-based hand . · Don't share personal household items. These include bedding, towels, cups and glasses, and eating utensils. · [address] in the warmest water allowed for the fabric type, and dry it completely. It's okay to wash other people's laundry with yours. · Clean and disinfect your home every day. Use household  and disinfectant wipes or sprays. Take special care to clean things that you grab with your hands. These include doorknobs, remote controls, phones, and handles on your refrigerator and microwave. And don't forget countertops, tabletops, bathrooms, and computer keyboards. When should you call for help? [id number] anytime you think you may need emergency care.  For example, call if you have life-threatening symptoms, such as:  · You have severe trouble breathing. (You can't talk at all.)  · You have constant chest pain or pressure. · You are severely dizzy or lightheaded. · You are confused or can't think clearly. · Your face and lips have a blue color. · You pass out (lose consciousness) or are very hard to wake up. Call your doctor now or seek immediate medical care if:  · You have moderate trouble breathing. (You can't speak a full sentence.)  · You are coughing up blood (more than about 1 teaspoon). · You have signs of low blood pressure. These include feeling lightheaded; being too weak to stand; and having cold, pale, clammy skin. Watch closely for changes in your health, and be sure to contact your doctor if:  · Your symptoms get worse. · You are not getting better as expected. Call before you go to the doctor's office. Follow their instructions. And wear a cloth face cover. Current as of: May 8, 2020               Content Version: 12.5  © 2006-2020 [employer]. Care instructions adapted under license by [employer] (which disclaims liability or warranty for this information). If you have questions about a medical condition or this instruction, always ask your healthcare professional. [address] any warranty or liability for your use of this information.

## 2020-07-01 LAB — SARS-COV-2, COV2NT: NOT DETECTED

## 2020-07-02 ENCOUNTER — TELEPHONE (OUTPATIENT)
Dept: CASE MANAGEMENT | Age: 65
End: 2020-07-02

## 2020-07-02 NOTE — TELEPHONE ENCOUNTER
Date/Time:  7/2/2020 9:17 AM  Attempted to reach patient by telephone. Left HIPPA compliant message requesting a return call. Will attempt to reach patient again.

## 2020-07-06 NOTE — TELEPHONE ENCOUNTER
Date/Time:  7/6/2020 2:37 PM  Attempted to reach patient by telephone. Left HIPPA compliant message requesting a return call.

## 2020-07-07 NOTE — TELEPHONE ENCOUNTER
Patient resolved from Transition of Care episode on 7/7/2020    Called patient twice requesting a call back patient never returned my call

## 2020-07-10 ENCOUNTER — HOSPITAL ENCOUNTER (INPATIENT)
Age: 65
LOS: 10 days | Discharge: HOME | DRG: 229 | End: 2020-07-21
Attending: EMERGENCY MEDICINE | Admitting: INTERNAL MEDICINE
Payer: MEDICAID

## 2020-07-10 ENCOUNTER — APPOINTMENT (OUTPATIENT)
Dept: CT IMAGING | Age: 65
DRG: 229 | End: 2020-07-10
Attending: EMERGENCY MEDICINE
Payer: MEDICAID

## 2020-07-10 DIAGNOSIS — K56.609 SBO (SMALL BOWEL OBSTRUCTION) (HCC): ICD-10-CM

## 2020-07-10 DIAGNOSIS — N17.9 ACUTE RENAL FAILURE, UNSPECIFIED ACUTE RENAL FAILURE TYPE (HCC): Primary | ICD-10-CM

## 2020-07-10 DIAGNOSIS — E87.6 HYPOKALEMIA: ICD-10-CM

## 2020-07-10 DIAGNOSIS — J18.9 PNEUMONIA OF RIGHT MIDDLE LOBE DUE TO INFECTIOUS ORGANISM: ICD-10-CM

## 2020-07-10 LAB
ALBUMIN SERPL-MCNC: 2.4 G/DL (ref 3.4–5)
ALBUMIN/GLOB SERPL: 0.5 {RATIO} (ref 0.8–1.7)
ALP SERPL-CCNC: 83 U/L (ref 45–117)
ALT SERPL-CCNC: 64 U/L (ref 16–61)
ANION GAP SERPL CALC-SCNC: 16 MMOL/L (ref 3–18)
AST SERPL-CCNC: 62 U/L (ref 10–38)
BASOPHILS # BLD: 0 K/UL (ref 0–0.1)
BASOPHILS NFR BLD: 0 % (ref 0–3)
BILIRUB SERPL-MCNC: 3.4 MG/DL (ref 0.2–1)
BUN SERPL-MCNC: 133 MG/DL (ref 7–18)
BUN/CREAT SERPL: 26 (ref 12–20)
CALCIUM SERPL-MCNC: 7 MG/DL (ref 8.5–10.1)
CHLORIDE SERPL-SCNC: 95 MMOL/L (ref 100–111)
CO2 SERPL-SCNC: 22 MMOL/L (ref 21–32)
CREAT SERPL-MCNC: 5.12 MG/DL (ref 0.6–1.3)
DIFFERENTIAL METHOD BLD: ABNORMAL
EOSINOPHIL # BLD: 0.1 K/UL (ref 0–0.4)
EOSINOPHIL NFR BLD: 1 % (ref 0–5)
ERYTHROCYTE [DISTWIDTH] IN BLOOD BY AUTOMATED COUNT: 12.9 % (ref 11.6–14.5)
GLOBULIN SER CALC-MCNC: 4.4 G/DL (ref 2–4)
GLUCOSE SERPL-MCNC: 237 MG/DL (ref 74–99)
HCT VFR BLD AUTO: 35 % (ref 36–48)
HGB BLD-MCNC: 12.4 G/DL (ref 13–16)
LIPASE SERPL-CCNC: 85 U/L (ref 73–393)
LYMPHOCYTES # BLD: 0.5 K/UL (ref 0.8–3.5)
LYMPHOCYTES NFR BLD: 8 % (ref 20–51)
MCH RBC QN AUTO: 32.3 PG (ref 24–34)
MCHC RBC AUTO-ENTMCNC: 35.4 G/DL (ref 31–37)
MCV RBC AUTO: 91.1 FL (ref 74–97)
METAMYELOCYTES NFR BLD MANUAL: 8 %
MONOCYTES # BLD: 0.3 K/UL (ref 0–1)
MONOCYTES NFR BLD: 6 % (ref 2–9)
MYELOCYTES NFR BLD MANUAL: 7 %
NEUTS BAND NFR BLD MANUAL: 8 % (ref 0–5)
NEUTS SEG # BLD: 3.5 K/UL (ref 1.8–8)
NEUTS SEG NFR BLD: 62 % (ref 42–75)
PLATELET # BLD AUTO: 112 K/UL (ref 135–420)
PMV BLD AUTO: 12.4 FL (ref 9.2–11.8)
POTASSIUM SERPL-SCNC: 3 MMOL/L (ref 3.5–5.5)
PROT SERPL-MCNC: 6.8 G/DL (ref 6.4–8.2)
RBC # BLD AUTO: 3.84 M/UL (ref 4.7–5.5)
RBC MORPH BLD: ABNORMAL
SODIUM SERPL-SCNC: 133 MMOL/L (ref 136–145)
TROPONIN I SERPL-MCNC: <0.02 NG/ML (ref 0–0.04)
WBC # BLD AUTO: 5.7 K/UL (ref 4.6–13.2)

## 2020-07-10 PROCEDURE — 96375 TX/PRO/DX INJ NEW DRUG ADDON: CPT

## 2020-07-10 PROCEDURE — 80053 COMPREHEN METABOLIC PANEL: CPT

## 2020-07-10 PROCEDURE — 74011250636 HC RX REV CODE- 250/636: Performed by: EMERGENCY MEDICINE

## 2020-07-10 PROCEDURE — 99285 EMERGENCY DEPT VISIT HI MDM: CPT

## 2020-07-10 PROCEDURE — 96361 HYDRATE IV INFUSION ADD-ON: CPT

## 2020-07-10 PROCEDURE — 85025 COMPLETE CBC W/AUTO DIFF WBC: CPT

## 2020-07-10 PROCEDURE — 74176 CT ABD & PELVIS W/O CONTRAST: CPT

## 2020-07-10 PROCEDURE — 83690 ASSAY OF LIPASE: CPT

## 2020-07-10 PROCEDURE — 84484 ASSAY OF TROPONIN QUANT: CPT

## 2020-07-10 RX ORDER — POTASSIUM CHLORIDE 7.45 MG/ML
10 INJECTION INTRAVENOUS
Status: COMPLETED | OUTPATIENT
Start: 2020-07-10 | End: 2020-07-11

## 2020-07-10 RX ORDER — ONDANSETRON 2 MG/ML
4 INJECTION INTRAMUSCULAR; INTRAVENOUS
Status: COMPLETED | OUTPATIENT
Start: 2020-07-10 | End: 2020-07-10

## 2020-07-10 RX ADMIN — SODIUM CHLORIDE 1000 ML: 900 INJECTION, SOLUTION INTRAVENOUS at 22:41

## 2020-07-10 RX ADMIN — ONDANSETRON 4 MG: 2 INJECTION INTRAMUSCULAR; INTRAVENOUS at 22:41

## 2020-07-11 ENCOUNTER — APPOINTMENT (OUTPATIENT)
Dept: GENERAL RADIOLOGY | Age: 65
DRG: 229 | End: 2020-07-11
Attending: INTERNAL MEDICINE
Payer: MEDICAID

## 2020-07-11 PROBLEM — N17.9 STAGE 2 ACUTE KIDNEY INJURY (HCC): Status: ACTIVE | Noted: 2020-07-11

## 2020-07-11 PROBLEM — N40.0 BPH (BENIGN PROSTATIC HYPERPLASIA): Status: ACTIVE | Noted: 2020-07-11

## 2020-07-11 PROBLEM — Z87.891 FORMER SMOKER: Status: ACTIVE | Noted: 2020-07-11

## 2020-07-11 PROBLEM — J18.9 COMMUNITY ACQUIRED PNEUMONIA OF RIGHT MIDDLE LOBE OF LUNG: Status: ACTIVE | Noted: 2020-07-11

## 2020-07-11 PROBLEM — R73.9 HYPERGLYCEMIA: Status: ACTIVE | Noted: 2020-07-11

## 2020-07-11 PROBLEM — E87.1 HYPONATREMIA: Status: ACTIVE | Noted: 2020-07-11

## 2020-07-11 LAB
APPEARANCE UR: CLEAR
ATRIAL RATE: 91 BPM
BILIRUB UR QL: ABNORMAL
CALCULATED P AXIS, ECG09: 60 DEGREES
CALCULATED R AXIS, ECG10: 20 DEGREES
CALCULATED T AXIS, ECG11: 97 DEGREES
COLOR UR: ABNORMAL
DIAGNOSIS, 93000: NORMAL
GLUCOSE BLD STRIP.AUTO-MCNC: 129 MG/DL (ref 70–110)
GLUCOSE BLD STRIP.AUTO-MCNC: 163 MG/DL (ref 70–110)
GLUCOSE BLD STRIP.AUTO-MCNC: 180 MG/DL (ref 70–110)
GLUCOSE UR STRIP.AUTO-MCNC: NEGATIVE MG/DL
HGB UR QL STRIP: NEGATIVE
KETONES UR QL STRIP.AUTO: NEGATIVE MG/DL
LACTATE SERPL-SCNC: 3.4 MMOL/L (ref 0.4–2)
LEUKOCYTE ESTERASE UR QL STRIP.AUTO: NEGATIVE
NITRITE UR QL STRIP.AUTO: NEGATIVE
P-R INTERVAL, ECG05: 168 MS
PH UR STRIP: 5 [PH] (ref 5–8)
PROT UR STRIP-MCNC: NEGATIVE MG/DL
Q-T INTERVAL, ECG07: 362 MS
QRS DURATION, ECG06: 80 MS
QTC CALCULATION (BEZET), ECG08: 445 MS
SP GR UR REFRACTOMETRY: 1.01 (ref 1–1.03)
UROBILINOGEN UR QL STRIP.AUTO: 1 EU/DL (ref 0.2–1)
VENTRICULAR RATE, ECG03: 91 BPM

## 2020-07-11 PROCEDURE — 83605 ASSAY OF LACTIC ACID: CPT

## 2020-07-11 PROCEDURE — 74011250636 HC RX REV CODE- 250/636: Performed by: INTERNAL MEDICINE

## 2020-07-11 PROCEDURE — 96366 THER/PROPH/DIAG IV INF ADDON: CPT

## 2020-07-11 PROCEDURE — 74011250637 HC RX REV CODE- 250/637: Performed by: EMERGENCY MEDICINE

## 2020-07-11 PROCEDURE — 74011636637 HC RX REV CODE- 636/637: Performed by: HOSPITALIST

## 2020-07-11 PROCEDURE — 74011250636 HC RX REV CODE- 250/636: Performed by: EMERGENCY MEDICINE

## 2020-07-11 PROCEDURE — 77030040361 HC SLV COMPR DVT MDII -B

## 2020-07-11 PROCEDURE — 74011000250 HC RX REV CODE- 250: Performed by: EMERGENCY MEDICINE

## 2020-07-11 PROCEDURE — 74011000258 HC RX REV CODE- 258: Performed by: INTERNAL MEDICINE

## 2020-07-11 PROCEDURE — 74011250637 HC RX REV CODE- 250/637: Performed by: INTERNAL MEDICINE

## 2020-07-11 PROCEDURE — 65270000029 HC RM PRIVATE

## 2020-07-11 PROCEDURE — 81003 URINALYSIS AUTO W/O SCOPE: CPT

## 2020-07-11 PROCEDURE — 93005 ELECTROCARDIOGRAM TRACING: CPT

## 2020-07-11 PROCEDURE — 36415 COLL VENOUS BLD VENIPUNCTURE: CPT

## 2020-07-11 PROCEDURE — 96365 THER/PROPH/DIAG IV INF INIT: CPT

## 2020-07-11 PROCEDURE — 71045 X-RAY EXAM CHEST 1 VIEW: CPT

## 2020-07-11 PROCEDURE — 87040 BLOOD CULTURE FOR BACTERIA: CPT

## 2020-07-11 PROCEDURE — 74011000250 HC RX REV CODE- 250: Performed by: INTERNAL MEDICINE

## 2020-07-11 PROCEDURE — 82962 GLUCOSE BLOOD TEST: CPT

## 2020-07-11 PROCEDURE — 74011636637 HC RX REV CODE- 636/637: Performed by: INTERNAL MEDICINE

## 2020-07-11 PROCEDURE — 96361 HYDRATE IV INFUSION ADD-ON: CPT

## 2020-07-11 RX ORDER — LATANOPROST 50 UG/ML
1 SOLUTION/ DROPS OPHTHALMIC
Status: DISCONTINUED | OUTPATIENT
Start: 2020-07-11 | End: 2020-07-21 | Stop reason: HOSPADM

## 2020-07-11 RX ORDER — SODIUM CHLORIDE 9 MG/ML
150 INJECTION, SOLUTION INTRAVENOUS CONTINUOUS
Status: DISCONTINUED | OUTPATIENT
Start: 2020-07-11 | End: 2020-07-13

## 2020-07-11 RX ORDER — PANTOPRAZOLE SODIUM 40 MG/10ML
40 INJECTION, POWDER, LYOPHILIZED, FOR SOLUTION INTRAVENOUS DAILY PRN
Status: DISCONTINUED | OUTPATIENT
Start: 2020-07-11 | End: 2020-07-15

## 2020-07-11 RX ORDER — ACETAMINOPHEN 325 MG/1
650 TABLET ORAL
Status: DISCONTINUED | OUTPATIENT
Start: 2020-07-11 | End: 2020-07-21 | Stop reason: HOSPADM

## 2020-07-11 RX ORDER — INSULIN LISPRO 100 [IU]/ML
INJECTION, SOLUTION INTRAVENOUS; SUBCUTANEOUS
Status: DISCONTINUED | OUTPATIENT
Start: 2020-07-11 | End: 2020-07-18

## 2020-07-11 RX ORDER — METOPROLOL TARTRATE 50 MG/1
50 TABLET ORAL 2 TIMES DAILY
Status: DISCONTINUED | OUTPATIENT
Start: 2020-07-11 | End: 2020-07-21 | Stop reason: HOSPADM

## 2020-07-11 RX ORDER — LANOLIN ALCOHOL/MO/W.PET/CERES
12 CREAM (GRAM) TOPICAL
Status: DISCONTINUED | OUTPATIENT
Start: 2020-07-11 | End: 2020-07-21 | Stop reason: HOSPADM

## 2020-07-11 RX ORDER — DEXTROSE MONOHYDRATE 100 MG/ML
125-250 INJECTION, SOLUTION INTRAVENOUS AS NEEDED
Status: DISCONTINUED | OUTPATIENT
Start: 2020-07-11 | End: 2020-07-21 | Stop reason: HOSPADM

## 2020-07-11 RX ORDER — TAMSULOSIN HYDROCHLORIDE 0.4 MG/1
0.4 CAPSULE ORAL DAILY
Status: DISCONTINUED | OUTPATIENT
Start: 2020-07-11 | End: 2020-07-21 | Stop reason: HOSPADM

## 2020-07-11 RX ORDER — AZITHROMYCIN 250 MG/1
500 TABLET, FILM COATED ORAL
Status: COMPLETED | OUTPATIENT
Start: 2020-07-11 | End: 2020-07-11

## 2020-07-11 RX ORDER — AMLODIPINE BESYLATE 10 MG/1
10 TABLET ORAL DAILY
Status: DISCONTINUED | OUTPATIENT
Start: 2020-07-11 | End: 2020-07-21 | Stop reason: HOSPADM

## 2020-07-11 RX ORDER — MAGNESIUM SULFATE 100 %
4 CRYSTALS MISCELLANEOUS AS NEEDED
Status: DISCONTINUED | OUTPATIENT
Start: 2020-07-11 | End: 2020-07-21 | Stop reason: HOSPADM

## 2020-07-11 RX ORDER — DOCUSATE SODIUM 100 MG/1
100 CAPSULE, LIQUID FILLED ORAL
Status: DISCONTINUED | OUTPATIENT
Start: 2020-07-11 | End: 2020-07-21 | Stop reason: HOSPADM

## 2020-07-11 RX ORDER — ONDANSETRON 2 MG/ML
4 INJECTION INTRAMUSCULAR; INTRAVENOUS
Status: DISCONTINUED | OUTPATIENT
Start: 2020-07-11 | End: 2020-07-21 | Stop reason: HOSPADM

## 2020-07-11 RX ORDER — IPRATROPIUM BROMIDE AND ALBUTEROL SULFATE 2.5; .5 MG/3ML; MG/3ML
3 SOLUTION RESPIRATORY (INHALATION)
Status: DISCONTINUED | OUTPATIENT
Start: 2020-07-11 | End: 2020-07-21 | Stop reason: HOSPADM

## 2020-07-11 RX ORDER — INSULIN GLARGINE 100 [IU]/ML
5 INJECTION, SOLUTION SUBCUTANEOUS DAILY
Status: DISCONTINUED | OUTPATIENT
Start: 2020-07-11 | End: 2020-07-12

## 2020-07-11 RX ORDER — ONDANSETRON 2 MG/ML
4 INJECTION INTRAMUSCULAR; INTRAVENOUS
Status: DISCONTINUED | OUTPATIENT
Start: 2020-07-11 | End: 2020-07-11

## 2020-07-11 RX ORDER — GUAIFENESIN 600 MG/1
600 TABLET, EXTENDED RELEASE ORAL EVERY 12 HOURS
Status: COMPLETED | OUTPATIENT
Start: 2020-07-11 | End: 2020-07-12

## 2020-07-11 RX ORDER — CEFTRIAXONE 1 G/1
1 INJECTION, POWDER, FOR SOLUTION INTRAMUSCULAR; INTRAVENOUS
Status: DISCONTINUED | OUTPATIENT
Start: 2020-07-11 | End: 2020-07-11

## 2020-07-11 RX ADMIN — ONDANSETRON 4 MG: 2 INJECTION INTRAMUSCULAR; INTRAVENOUS at 10:42

## 2020-07-11 RX ADMIN — TAMSULOSIN HYDROCHLORIDE 0.4 MG: 0.4 CAPSULE ORAL at 09:42

## 2020-07-11 RX ADMIN — METOPROLOL TARTRATE 50 MG: 50 TABLET, FILM COATED ORAL at 19:00

## 2020-07-11 RX ADMIN — SODIUM CHLORIDE 150 ML/HR: 900 INJECTION, SOLUTION INTRAVENOUS at 10:29

## 2020-07-11 RX ADMIN — LATANOPROST 1 DROP: 50 SOLUTION OPHTHALMIC at 22:10

## 2020-07-11 RX ADMIN — POTASSIUM CHLORIDE 10 MEQ: 10 INJECTION, SOLUTION INTRAVENOUS at 00:37

## 2020-07-11 RX ADMIN — GUAIFENESIN 600 MG: 600 TABLET, EXTENDED RELEASE ORAL at 22:10

## 2020-07-11 RX ADMIN — ONDANSETRON 4 MG: 2 INJECTION INTRAMUSCULAR; INTRAVENOUS at 16:58

## 2020-07-11 RX ADMIN — CEFTRIAXONE 1 G: 1 INJECTION, POWDER, FOR SOLUTION INTRAMUSCULAR; INTRAVENOUS at 02:29

## 2020-07-11 RX ADMIN — GUAIFENESIN 600 MG: 600 TABLET, EXTENDED RELEASE ORAL at 09:42

## 2020-07-11 RX ADMIN — INSULIN LISPRO 2 UNITS: 100 INJECTION, SOLUTION INTRAVENOUS; SUBCUTANEOUS at 17:00

## 2020-07-11 RX ADMIN — SODIUM CHLORIDE 150 ML/HR: 900 INJECTION, SOLUTION INTRAVENOUS at 04:05

## 2020-07-11 RX ADMIN — CEFTRIAXONE 1 G: 1 INJECTION, POWDER, FOR SOLUTION INTRAMUSCULAR; INTRAVENOUS at 13:35

## 2020-07-11 RX ADMIN — AMLODIPINE BESYLATE 10 MG: 10 TABLET ORAL at 09:42

## 2020-07-11 RX ADMIN — INSULIN GLARGINE 5 UNITS: 100 INJECTION, SOLUTION SUBCUTANEOUS at 13:34

## 2020-07-11 RX ADMIN — METOPROLOL TARTRATE 50 MG: 50 TABLET, FILM COATED ORAL at 09:42

## 2020-07-11 RX ADMIN — AZITHROMYCIN MONOHYDRATE 500 MG: 250 TABLET ORAL at 02:28

## 2020-07-11 NOTE — ROUTINE PROCESS
TRANSFER - IN REPORT: 
 
Verbal report received from DIMPLE Wilson on [place]  being received from ED (unit) for routine progression of care Report consisted of patients Situation, Background, Assessment and  
Recommendations(SBAR). Information from the following report(s) SBAR, Kardex, Intake/Output, MAR and Recent Results was reviewed with the receiving nurse. Opportunity for questions and clarification was provided. Assessment will be completed upon patients arrival to unit and care assumed.

## 2020-07-11 NOTE — ED PROVIDER NOTES
EMERGENCY DEPARTMENT HISTORY AND PHYSICAL EXAM    10:15 PM      Date: 7/10/2020  Patient Name: Sana Murphy    History of Presenting Illness     Chief Complaint   Patient presents with    Vomiting         History Provided By: Patient    Chief Complaint: vomiting  Duration:  Days  Timing:  Acute  Location: right upper chest  Quality: soreness  Severity: Mild  Modifying Factors: none  Associated Symptoms: denies any other associated signs or symptoms      Additional History (Context): Sana Murphy is a 75 y.o. male with diabetes, hypertension and CAD who presents with vomiting x 3 days. Also reports mild pain upper R chest. Has not taken anything for this. No alleviating factors. Denies any known sick contacts. Denies any fever or chills. No urinary symptoms. No diarrhea. No other complaints. Last checked his glucose about 3 days ago was 150. Is no longer on insulin only on metformin and Januvia    PCP: Lucretia Pan MD    Current Facility-Administered Medications   Medication Dose Route Frequency Provider Last Rate Last Dose    potassium chloride 10 mEq in 100 ml IVPB  10 mEq IntraVENous NOW Jens June M,  mL/hr at 07/11/20 0037 10 mEq at 07/11/20 0037     Current Outpatient Medications   Medication Sig Dispense Refill    dextromethorphan-guaiFENesin (ROBITUSSIN-DM)  mg/5 mL syrup Take 10 mL by mouth every six (6) hours as needed for Cough. 1 Bottle 0    promethazine-dextromethorphan (PROMETHAZINE-DM) 6.25-15 mg/5 mL syrup TAKE 5 ML EVERY 6 HOURS BY ORAL ROUTE AS NEEDED.  travoprost (Travatan Z) 0.004 % ophthalmic solution 1 gtt QHS OS      tadalafiL (CIALIS) 5 mg tablet Take 1 Tab by mouth daily as needed for Erectile Dysfunction.  90 Tab 3    LANTUS SOLOSTAR U-100 INSULIN 100 unit/mL (3 mL) inpn INJECT 40 UNIT(S) EVERY DAY BY SUBCUTANEOUS ROUTE.  0    insulin glargine (LANTUS SOLOSTAR U-100 INSULIN) 100 unit/mL (3 mL) inpn Lantus Solostar U-100 Insulin 100 unit/mL (3 mL) subcutaneous pen      Diabetic Supplies, [facility] (INJECT-EASE AUTOMATIC INJECTOR) [facility] [place] FOR USE WITH INTRACAVERNOSAL INJECTIONS. 1 Each 1    OTHER,NON-FORMULARY, 0.5 mL by IntraCAVernosal route as needed (For ED). Please dispense a 5 ml amount of of Trimix consisting of PGE 60 mcg/Papaverine 30 mg/Phentolamine 2 mg per ml. Patient is to bring to office for dosing 5 Each 5    Syringe with Needle,Disp, Tray (ALLERGIST TRAY 1CC 27GX1/2\") 1 mL 27 x 1/2\" tray 1 Each by Does Not Apply route as needed (For ED). Please dispense one tray of allergy syringes. To be used as directed. 25 Each 5    cloNIDine HCl (CATAPRES) 0.1 mg tablet Take  by mouth two (2) times a day.  furosemide (LASIX) 20 mg tablet Take  by mouth daily.  labetalol (NORMODYNE) 200 mg tablet TAKE 1 TABLET TWICE A DAY  2    naproxen (NAPROSYN) 500 mg tablet TAKE 1 TABLET TWICE A DAY  0    NIFEdipine ER (ADALAT CC) 90 mg ER tablet TAKE 1 TABLET EVERY DAY  2    metoprolol tartrate (LOPRESSOR) 50 mg tablet TAKE 1 TABLET(S) EVERY DAY BY ORAL ROUTE FOR 30 DAYS.  0    Syringe with Needle, Disp, (SYRINGE 3CC/21GX1\") 3 mL 21 gauge x 1\" syrg 1 Syringe by Does Not Apply route every seven (7) days. 4 Syringe 5    OTHER,NON-FORMULARY, TRI-MIX 60 MCGS PGE/30MG PAPAVERINE/2 MG REGITINE/ML 5 Each 5    Syringe with Needle, Disp, (ALLERGY SYRINGE) 1 mL 27 x 1/2\" syrg For use with Intracavernosal Trimix injections 25 Pen Needle 2    tamsulosin (FLOMAX) 0.4 mg capsule 0.4 mg.      oxyCODONE-acetaminophen (PERCOCET 7.5) 7.5-325 mg per tablet TAKE 1 TABLET BY MOUTH FOUR TIMES DAILY, AS NEEDED, FOR 30 DAYS  0    losartan-hydrochlorothiazide (HYZAAR) 100-25 mg per tablet Take 1 Tab by Mouth Once a Day.  amLODIPine (NORVASC) 10 mg tablet Take  by mouth daily.  sitaGLIPtin (JANUVIA) 100 mg tablet Take 100 mg by mouth daily.  insulin detemir (LEVEMIR FLEXTOUCH) 100 unit/mL (3 mL) inpn by SubCUTAneous route.       metFORMIN (GLUCOPHAGE) 1,000 mg tablet Take 1,000 mg by mouth two (2) times daily (with meals). Past History     Past Medical History:  Past Medical History:   Diagnosis Date    Arthritis     Benign hypertension     Coronary artery disease     Diabetes mellitus ([address]     Enlarged heart     Erectile dysfunction     Frequency of micturition     Hypogonadism in male     Impotence     Incomplete bladder emptying     Musculoskeletal pain     Nocturia     Urgency of micturition     Wrist joint pain        Past Surgical History:  Past Surgical History:   Procedure Laterality Date    HX CARPAL TUNNEL RELEASE      HX HERNIA REPAIR         Family History:  Family History   Problem Relation Age of Onset    Diabetes Mother     Hypertension Mother     Heart Failure Mother     High Cholesterol Mother     Stroke Mother     Other Mother         vision problems    Diabetes Father     Hypertension Father     Diabetes Sister     HIV/AIDS Sister     Hypertension Sister     HIV/AIDS Sister     No Known Problems Brother     HIV/AIDS Sister     Diabetes Maternal Uncle     Hypertension Maternal Uncle        Social History:  Social History     Tobacco Use    Smoking status: Former Smoker     Packs/day: 0.50     Years: 5.00     Pack years: 2.50     Types: Cigarettes     Last attempt to quit: 1976     Years since quittin.5    Smokeless tobacco: Never Used   Substance Use Topics    Alcohol use: Yes     Comment: occassionally    Drug use: No       Allergies: Allergies   Allergen Reactions    Aspirin Anaphylaxis     Fatal    Lisinopril Cough         Review of Systems       Review of Systems   Constitutional: Negative for fever. Respiratory: Negative for shortness of breath. Cardiovascular: Positive for chest pain. Negative for leg swelling. Gastrointestinal: Positive for nausea and vomiting. Negative for blood in stool. Genitourinary: Negative for dysuria.    All other systems reviewed and are negative. Physical Exam     Visit Vitals  /68 (BP 1 Location: Right arm, BP Patient Position: Sitting)   Pulse (!) 101   Temp 98.8 °F (37.1 °C)   Resp 20   Ht 5' 10\" (1.778 m)   Wt 81.6 kg (180 lb)   SpO2 98%   BMI 25.83 kg/m²         Physical Exam  Constitutional:       Appearance: He is well-developed. HENT:      Head: Normocephalic and atraumatic. Neck:      Musculoskeletal: Neck supple. Vascular: No JVD. Cardiovascular:      Rate and Rhythm: Regular rhythm. Tachycardia present. Pulmonary:      Effort: Pulmonary effort is normal. No respiratory distress. Breath sounds: Normal breath sounds. Chest:      Chest wall: No tenderness. Abdominal:      General: There is no distension. Palpations: Abdomen is soft. Tenderness: There is no abdominal tenderness. There is no guarding or rebound. Musculoskeletal:      Comments: No joint tenderness   Skin:     General: Skin is warm and dry. Findings: No erythema. Neurological:      Mental Status: He is alert and oriented to person, place, and time. Psychiatric:         Judgment: Judgment normal.           Diagnostic Study Results     Labs -  Recent Results (from the past 12 hour(s))   CBC WITH AUTOMATED DIFF    Collection Time: 07/10/20 10:15 PM   Result Value Ref Range    WBC 5.7 4.6 - 13.2 K/uL    RBC 3.84 (L) 4.70 - 5.50 M/uL    HGB 12.4 (L) 13.0 - 16.0 g/dL    HCT 35.0 (L) 36.0 - 48.0 %    MCV 91.1 74.0 - 97.0 FL    MCH 32.3 24.0 - 34.0 PG    MCHC 35.4 31.0 - 37.0 g/dL    RDW 12.9 11.6 - 14.5 %    PLATELET 414 (L) 821 - 420 K/uL    MPV 12.4 (H) 9.2 - 11.8 FL    NEUTROPHILS 62 42 - 75 %    BAND NEUTROPHILS 8 (H) 0 - 5 %    LYMPHOCYTES 8 (L) 20 - 51 %    MONOCYTES 6 2 - 9 %    EOSINOPHILS 1 0 - 5 %    BASOPHILS 0 0 - 3 %    METAMYELOCYTES 8 (H) 0 %    MYELOCYTES 7 (H) 0 %    ABS. NEUTROPHILS 3.5 1.8 - 8.0 K/UL    ABS. LYMPHOCYTES 0.5 (L) 0.8 - 3.5 K/UL    ABS. MONOCYTES 0.3 0 - 1.0 K/UL    ABS.  EOSINOPHILS 0.1 0.0 - 0.4 K/UL    ABS. BASOPHILS 0.0 0.0 - 0.1 K/UL    RBC COMMENTS NORMOCYTIC, NORMOCHROMIC      DF MANUAL     METABOLIC PANEL, COMPREHENSIVE    Collection Time: 07/10/20 10:15 PM   Result Value Ref Range    Sodium 133 (L) 136 - 145 mmol/L    Potassium 3.0 (L) 3.5 - 5.5 mmol/L    Chloride 95 (L) 100 - 111 mmol/L    CO2 22 21 - 32 mmol/L    Anion gap 16 3.0 - 18 mmol/L    Glucose 237 (H) 74 - 99 mg/dL     (H) 7.0 - 18 MG/DL    Creatinine 5.12 (H) 0.6 - 1.3 MG/DL    BUN/Creatinine ratio 26 (H) 12 - 20      GFR est AA 14 (L) >60 ml/min/1.73m2    GFR est non-AA 11 (L) >60 ml/min/1.73m2    Calcium 7.0 (L) 8.5 - 10.1 MG/DL    Bilirubin, total 3.4 (H) 0.2 - 1.0 MG/DL    ALT (SGPT) 64 (H) 16 - 61 U/L    AST (SGOT) 62 (H) 10 - 38 U/L    Alk. phosphatase 83 45 - 117 U/L    Protein, total 6.8 6.4 - 8.2 g/dL    Albumin 2.4 (L) 3.4 - 5.0 g/dL    Globulin 4.4 (H) 2.0 - 4.0 g/dL    A-G Ratio 0.5 (L) 0.8 - 1.7     LIPASE    Collection Time: 07/10/20 10:15 PM   Result Value Ref Range    Lipase 85 73 - 393 U/L   TROPONIN I    Collection Time: 07/10/20 10:15 PM   Result Value Ref Range    Troponin-I, QT <0.02 0.0 - 0.045 NG/ML   EKG, 12 LEAD, INITIAL    Collection Time: 07/11/20 12:16 AM   Result Value Ref Range    Ventricular Rate 91 BPM    Atrial Rate 91 BPM    P-R Interval 168 ms    QRS Duration 80 ms    Q-T Interval 362 ms    QTC Calculation (Bezet) 445 ms    Calculated P Axis 60 degrees    Calculated R Axis 20 degrees    Calculated T Axis 97 degrees    Diagnosis       Normal sinus rhythm  Nonspecific ST and T wave abnormality  Abnormal ECG  No previous ECGs available         Radiologic Studies -   CT ABD PELV WO CONT    (Results Pending)   XR CHEST PORT    (Results Pending)     CT: Airspace consolidation right middle lobe, which can be consistent with pneumonia. Multiple loops of mildly dilated, fluid-filled small bowel may suggest enteritis versus early low-grade small bowel obstruction.   Bowel tapers gradually in the lower paramedian right abdomen without definite transition point. CXR: Right middle lobe pneumonia    Medical Decision Making   I am the first provider for this patient. I reviewed the vital signs, available nursing notes, past medical history, past surgical history, family history and social history. Vital Signs-Reviewed the patient's vital signs. Pulse Oximetry Analysis -  98 on room air (Interpretation)nl     EKG: Interpreted by the EP. Time Interpreted: 1230   Rate: 91   Rhythm: Normal Sinus Rhythm    Interpretation: Normal axis, poor artifact, no ST changes,   Comparison: No prior for comparison    Records Reviewed: Nursing Notes and Old Medical Records (Time of Review: 10:15 PM)    ED Course: Progress Notes, Reevaluation, and Consults:      Provider Notes (Medical Decision Making): 68-year-old male presenting with vomiting x3 days and also some right upper chest pain. Screen for ACS with EKG and troponin although less likely based on duration of symptoms. Abdomen soft nonsurgical, doubt need for imaging but check basic labs give fluid bolus and Zofran for nausea. Patient overall well-appearing seems unlikely Boerhaave's. Patient noted to be in acute renal failure. We will plan for CT to rule out any obstructive process although he denies any difficulty urinating. Plan for admission. Patient agreement this plan. 11:21 PM  Consult Dr. Na Walters, hospitalist, accepts patient for admission      For Hospitalized Patients:    1. Hospitalization Decision Time:  The decision to hospitalize the patient was made by Dr. Micheal Mcgraw at 1122pm on 7/10/2020    2. Aspirin: Aspirin was not given because the patient did not present with a stroke at the time of their Emergency Department evaluation    Diagnosis     Clinical Impression:   1. Acute renal failure, unspecified acute renal failure type ([address]    2. Hypokalemia    3.  Pneumonia of right middle lobe due to infectious organism        Disposition: admit

## 2020-07-11 NOTE — PROGRESS NOTES
Pt received to unit from ED via stretcher, accompanied by Errol ED tech to room 2405. He is A&Ox4. No s/s of distress noted. Pt oriented to room, white board and use of call bell for assistance; Pt voiced understanding. Bed locked in low position; call bell in reach. See Admission assessment for further findings. 1040-  Patient with N&V. Noted thick white frothy secretion to basin. Mouth care provideed; see MAR for prn Zofran IV to be given. Call bell w/in reach. 1130- Patient awake. No further c/o having nausea. Call bell w/in reach. 1650-  Patient c/o nausea; see MAR for prn Zofran IV to be given. Call bell w/in reach. 1745- Patient awake. No further c/o having nausea. Call bell w/in reach.

## 2020-07-11 NOTE — ED TRIAGE NOTES
Pt to triage c/o 'dehydration'. States he has been vomiting x 3 days, nothing stays down. Denies abd pain, denies diarrhea. No urinary symptoms. No known fevers. States he didn't eat anything unusual for him but did have some bad acid reflux when symptoms first started.

## 2020-07-11 NOTE — H&P
History and Physical    Patient: Kang Acosta MRN: [id number]  SSN: [id number]    YOB: 1955  Age: 75 y.o. Sex: male      Subjective:      Kang Acosta is a 75 y.o. -American male who presents to [facility] ER with complaint of Vomiting. Patient reports 1 non-bloody emesis/day for the last 3 days and also reports inability to tolerate PO diet or drink during that time. Patient reports that he has been having shortness of breath, fatigue, weakness, and productive cough for 1-2 weeks. Patient reports some intermittent Left chest pain of 3/10 intensity for a similar duration that nothing makes better or worse. Patient also complains of some right shoulder pain. Patient denies fevers, chills, headaches, diarrhea, dysuria, and sick contacts. In [facility] ER, Patient is noted to have Respiratory Rate 33, Blood Pressure 188/74 mm Hg, SpO2 87%, WBC 1.9, Hgb 11.5, .5, Platelets 443, Neut# 1.1, CO2 33, BUN 35, Cr 2.25, eGFR 28/34, Troponin 0.13, and Pro-BNP 60681. CT Abdomen showed RML Pneumonia. Patient is admitted to [facility] Medical Unit (Non-Covid-19 Cohort) for management of Right Middle Lobe Community-Acquired Pneumonia and Acute Kidney Injury Stage III 2°/2 Vomiting.     Past Medical History:   Diagnosis Date    Arthritis     Benign hypertension     Coronary artery disease     Diabetes mellitus ([facility]     Enlarged heart     Erectile dysfunction     Frequency of micturition     Hypogonadism in male     Impotence     Incomplete bladder emptying     Musculoskeletal pain     Nocturia     Urgency of micturition     Wrist joint pain      Past Surgical History:   Procedure Laterality Date    HX CARPAL TUNNEL RELEASE      HX HERNIA REPAIR        Family History   Problem Relation Age of Onset    Diabetes Mother     Hypertension Mother     Heart Failure Mother     High Cholesterol Mother     Stroke Mother     Other Mother         vision problems    Diabetes Father     Hypertension Father     Diabetes Sister     HIV/AIDS Sister     Hypertension Sister     HIV/AIDS Sister     No Known Problems Brother     HIV/AIDS Sister     Diabetes Maternal Uncle     Hypertension Maternal Uncle      Social History     Tobacco Use    Smoking status: Former Smoker     Packs/day: 0.50     Years: 5.00     Pack years: 2.50     Types: Cigarettes     Last attempt to quit: 1976     Years since quittin.5    Smokeless tobacco: Never Used   Substance Use Topics    Alcohol use: Yes     Comment: occassionally      Lives with Family. Patient reports occasional use of Cane to ambulate. Patient reports Remote Heroin Use. Patient is a Former Smoker who Smoked 0.5 PPD x5 years and Quit in the 1970s. Prior to Admission medications    Medication Sig Start Date End Date Taking? Authorizing Provider   LANTUS SOLOSTAR U-100 INSULIN 100 unit/mL (3 mL) inpn INJECT 40 UNIT(S) EVERY DAY BY SUBCUTANEOUS ROUTE. 19  Yes Provider, Historical   insulin glargine (LANTUS SOLOSTAR U-100 INSULIN) 100 unit/mL (3 mL) inpn Lantus Solostar U-100 Insulin 100 unit/mL (3 mL) subcutaneous pen   Yes Provider, Historical   Diabetic Supplies, [address] (INJECT-EASE AUTOMATIC INJECTOR) [facility] [place] FOR USE WITH INTRACAVERNOSAL INJECTIONS. 19  Yes Trace Holcomb MD   Syringe with Needle,Disp, Tray (ALLERGIST TRAY 1CC 27GX1/2\") 1 mL 27 x 1/2\" tray 1 Each by Does Not Apply route as needed (For ED). Please dispense one tray of allergy syringes. To be used as directed. 19  Yes Trace Holcomb MD   Syringe with Needle, Disp, (SYRINGE 3CC/21GX1\") 3 mL 21 gauge x 1\" syrg 1 Syringe by Does Not Apply route every seven (7) days.  17  Yes Trace Holcomb MD   Syringe with Needle, Disp, (ALLERGY SYRINGE) 1 mL 27 x 1/2\" syrg For use with Intracavernosal Trimix injections 17  Yes Trace Holcomb MD   [facility] 0.4 mg capsule 0.4 mg. 13  Yes Provider, Historical   insulin detemir (LEVEMIR FLEXTOUCH) 100 unit/mL (3 mL) inpn by SubCUTAneous route. Yes Provider, Historical   dextromethorphan-guaiFENesin (ROBITUSSIN-DM)  mg/5 mL syrup Take 10 mL by mouth every six (6) hours as needed for Cough. 6/30/20   Darrel Pryor MD   metoclopramide HCl (Reglan) 10 mg tablet Take 1 Tab by mouth every six (6) hours as needed for Nausea or Headache for up to 10 days. 6/30/20 7/10/20  Darrel Pryor MD   promethazine-dextromethorphan (PROMETHAZINE-DM) 6.25-15 mg/5 mL syrup TAKE 5 ML EVERY 6 HOURS BY ORAL ROUTE AS NEEDED. 5/1/20   Provider, Historical   travoprost (Travatan Z) 0.004 % ophthalmic solution 1 gtt QHS OS    Provider, Historical   tadalafiL (CIALIS) 5 mg tablet Take 1 Tab by mouth daily as needed for Erectile Dysfunction. 5/6/20   Catalina Frank MD   OTHER,NON-FORMULARY, 0.5 mL by IntraCAVernosal route as needed (For ED). Please dispense a 5 ml amount of of Trimix consisting of PGE 60 mcg/Papaverine 30 mg/Phentolamine 2 mg per ml. Patient is to bring to office for dosing 8/6/19   Catalina Frank MD   cloNIDine HCl (CATAPRES) 0.1 mg tablet Take  by mouth two (2) times a day. Provider, Historical   furosemide (LASIX) 20 mg tablet Take  by mouth daily. Provider, Historical   labetalol (NORMODYNE) 200 mg tablet TAKE 1 TABLET TWICE A DAY 6/16/17   Provider, Historical   naproxen (NAPROSYN) 500 mg tablet TAKE 1 TABLET TWICE A DAY 6/16/17   Provider, Historical   NIFEdipine ER (ADALAT CC) 90 mg ER tablet TAKE 1 TABLET EVERY DAY 6/16/17   Provider, Historical   metoprolol tartrate (LOPRESSOR) 50 mg tablet TAKE 1 TABLET(S) EVERY DAY BY ORAL ROUTE FOR 30 DAYS.  5/15/17   Provider, Historical   OTHER,NON-FORMULARY, TRI-MIX 60 MCGS PGE/30MG PAPAVERINE/2 MG REGITINE/ML 5/25/17   Catalina Frank MD   oxyCODONE-acetaminophen (PERCOCET 7.5) 7.5-325 mg per tablet TAKE 1 TABLET BY MOUTH FOUR TIMES DAILY, AS NEEDED, FOR 30 DAYS 2/10/17   Provider, Historical   losartan-hydrochlorothiazide (HYZAAR) 100-25 mg per tablet Take 1 Tab by Mouth Once a Day. 9/25/13   Provider, Historical   amLODIPine (NORVASC) 10 mg tablet Take  by mouth daily. Provider, Historical   sitaGLIPtin (JANUVIA) 100 mg tablet Take 100 mg by mouth daily. Provider, Historical   metFORMIN (GLUCOPHAGE) 1,000 mg tablet Take 1,000 mg by mouth two (2) times daily (with meals). Provider, Historical        Allergies   Allergen Reactions    Aspirin Anaphylaxis     Fatal    Lisinopril Cough       Review of Systems:  (+) Loss of Appetite  (+) Fatigue  (+) Generalized Weakness  (+) Cough  (+) Increased Sputum Production  (+) Shortness of Breath  (+) Dyspnea on Exertion  (+) Chest Pain  (+) Nausea  (+) Vomiting  (-) Fevers  (-) Chills  (-) Pain with Inspiration  (-) Abdominal Pain  (-) Diarrhea  (-) Hematochezia/Melena  (-) Dysuria  All other systems have been reviewed and are negative      Objective:     Vitals:    07/11/20 0700 07/11/20 0724 07/11/20 1009 07/11/20 1120   BP: 113/65  125/74 119/77   Pulse: 90 91 92 92   Resp: 14 15 20 18   Temp:   98.6 °F (37 °C) 99.1 °F (37.3 °C)   SpO2:   100% 95%   Weight:       Height:            Physical Exam:  General:  Adult -American male lying in bed in no acute distress  HEENT:  Atraumatic, normocephalic; Pupils equally round and reactive to light with accommodation; Extraocular muscles intact; (+) Somewhat Moist Oropharynx with (+) Mild erythema of Posterior Pharyngeal Pillars without edema or exudates; (+) Edentulous; (+) Procedure Mask in place  Neck:  No Bruits; No Lymphadenopathy  Chest:  No pectus carinatum;  No pectus excavatum  Cardiovascular:  (+) Borderline tachycardic rate, regular rhythm without rubs, gallops, or murmurs  Respiratory:  (+) Slight to Moderate Crackles of Bilateral Mid-to-Lower lung-fields better appreciated over mid-lungs; no wheezes or rhonchi; normal effort of breathing  Abdominal:  Soft, non-tense, (+) Mild to Moderately tender LUQ; BS present without guarding, rebound, or masses  : Deferred  Extremities:  Pulses 2+ x4 with (+) 1+ Pitting Edema to Left Distal Tibia and (+) Minimal Pitting Edema to Right Distal Tibia without clubbing or cyanosis  Musculoskeletal:  Strength 5/5 and symmetrical in BUE and BLE  Integument:  No rash on face, forearms, or legs  Neurological:  A&O x4/4; No gross deficits of Visual Acuity, Eye Movement, Jaw Opening, Facial Expression, Hearing, Phonation, or Head Movement; No gross deficits of Tongue Movement or Slurring of Speech  Psychiatric:  Affect is appropriate; Language is present and fluent; Behavior is appropriate      I have personally reviewed the CXR and have found, obvious moderate to severe right mid-lung opacity consistent with pneumonia. I have personally reviewed the EKG and have found, per my read, unusual artifact in many leads that appears like a pulsing, electrical disturbance of unclear origin. Assessment:     Hospital Problems  Date Reviewed: 7/11/2020          Codes Class Noted POA    * (Principal) Stage 3 acute kidney injury ([facility] ICD-10-CM: N17.9  ICD-9-CM: 584.9  7/11/2020 Yes        Community acquired pneumonia of right middle lobe of lung ICD-10-CM: J18.9  ICD-9-CM: 486  7/11/2020 Yes        Hyponatremia ICD-10-CM: E87.1  ICD-9-CM: 276.1  7/11/2020 Yes        Hyperglycemia ICD-10-CM: R73.9  ICD-9-CM: 790.29  7/11/2020 Yes        Former smoker ICD-10-CM: C59.595  ICD-9-CM: V15.82  7/11/2020 Yes        BPH (benign prostatic hyperplasia) ICD-10-CM: N40.0  ICD-9-CM: 600.00  7/11/2020 Yes        Benign hypertension ICD-10-CM: I10  ICD-9-CM: 401.1  Unknown Yes        Essential hypertension ICD-10-CM: I10  ICD-9-CM: 401.9  6/21/2013 Yes        Type II diabetes mellitus ([facility] ICD-10-CM: E11.9  ICD-9-CM: 250.00  6/21/2013 Unknown              Plan:     Patient received 1 L NS in [facility] ER, IV Ceftriaxone, and IV Azithromycin. IV Ceftriaxone, IV Azithromycin, IV fluids ( mL/hr), Guaifenesin, Blood Cultures, and Sputum Culture.   PRADEEP expected to improve with IV fluids---follow Creatinine. Consider additional testing if Creatinine does not improve significantly with just IV fluids. Continue home medication for HTN, Eye Drops, and BPH. POC Glucose check qACHS with Corrective Insulin. DVT mechanoprophylaxis.     Signed By: Nayla Flowers,      July 11, 2020

## 2020-07-11 NOTE — ED NOTES
Pt OOB to side of bed under own power to urinate. Voiding 624 ML of clear machelle urine without difficulty.  IVF infusing well via pump IV site asyptomatic

## 2020-07-12 LAB
ALBUMIN SERPL-MCNC: 2.1 G/DL (ref 3.4–5)
ALBUMIN/GLOB SERPL: 0.5 {RATIO} (ref 0.8–1.7)
ALP SERPL-CCNC: 75 U/L (ref 45–117)
ALT SERPL-CCNC: 53 U/L (ref 16–61)
ANION GAP SERPL CALC-SCNC: 8 MMOL/L (ref 3–18)
AST SERPL-CCNC: 55 U/L (ref 10–38)
BASOPHILS # BLD: 0 K/UL (ref 0–0.1)
BASOPHILS NFR BLD: 0 % (ref 0–2)
BILIRUB SERPL-MCNC: 3 MG/DL (ref 0.2–1)
BUN SERPL-MCNC: 104 MG/DL (ref 7–18)
BUN/CREAT SERPL: 37 (ref 12–20)
CALCIUM SERPL-MCNC: 7.8 MG/DL (ref 8.5–10.1)
CHLORIDE SERPL-SCNC: 107 MMOL/L (ref 100–111)
CO2 SERPL-SCNC: 27 MMOL/L (ref 21–32)
CREAT SERPL-MCNC: 2.81 MG/DL (ref 0.6–1.3)
DIFFERENTIAL METHOD BLD: ABNORMAL
EOSINOPHIL # BLD: 0 K/UL (ref 0–0.4)
EOSINOPHIL NFR BLD: 0 % (ref 0–5)
ERYTHROCYTE [DISTWIDTH] IN BLOOD BY AUTOMATED COUNT: 12.9 % (ref 11.6–14.5)
GLOBULIN SER CALC-MCNC: 4.4 G/DL (ref 2–4)
GLUCOSE BLD STRIP.AUTO-MCNC: 143 MG/DL (ref 70–110)
GLUCOSE BLD STRIP.AUTO-MCNC: 166 MG/DL (ref 70–110)
GLUCOSE BLD STRIP.AUTO-MCNC: 196 MG/DL (ref 70–110)
GLUCOSE BLD STRIP.AUTO-MCNC: 203 MG/DL (ref 70–110)
GLUCOSE SERPL-MCNC: 148 MG/DL (ref 74–99)
HCT VFR BLD AUTO: 35.1 % (ref 36–48)
HGB BLD-MCNC: 12.2 G/DL (ref 13–16)
LYMPHOCYTES # BLD: 0.7 K/UL (ref 0.9–3.6)
LYMPHOCYTES NFR BLD: 8 % (ref 21–52)
MAGNESIUM SERPL-MCNC: 2.6 MG/DL (ref 1.6–2.6)
MCH RBC QN AUTO: 32 PG (ref 24–34)
MCHC RBC AUTO-ENTMCNC: 34.8 G/DL (ref 31–37)
MCV RBC AUTO: 92.1 FL (ref 74–97)
MONOCYTES # BLD: 1.3 K/UL (ref 0.05–1.2)
MONOCYTES NFR BLD: 15 % (ref 3–10)
NEUTS SEG # BLD: 6.3 K/UL (ref 1.8–8)
NEUTS SEG NFR BLD: 77 % (ref 40–73)
PLATELET # BLD AUTO: 129 K/UL (ref 135–420)
PMV BLD AUTO: 12.4 FL (ref 9.2–11.8)
POTASSIUM SERPL-SCNC: 2.4 MMOL/L (ref 3.5–5.5)
PROT SERPL-MCNC: 6.5 G/DL (ref 6.4–8.2)
RBC # BLD AUTO: 3.81 M/UL (ref 4.7–5.5)
SODIUM SERPL-SCNC: 142 MMOL/L (ref 136–145)
WBC # BLD AUTO: 8.3 K/UL (ref 4.6–13.2)

## 2020-07-12 PROCEDURE — 74011250637 HC RX REV CODE- 250/637: Performed by: HOSPITALIST

## 2020-07-12 PROCEDURE — 80053 COMPREHEN METABOLIC PANEL: CPT

## 2020-07-12 PROCEDURE — 74011636637 HC RX REV CODE- 636/637: Performed by: HOSPITALIST

## 2020-07-12 PROCEDURE — 36415 COLL VENOUS BLD VENIPUNCTURE: CPT

## 2020-07-12 PROCEDURE — 74011250636 HC RX REV CODE- 250/636: Performed by: INTERNAL MEDICINE

## 2020-07-12 PROCEDURE — 74011250637 HC RX REV CODE- 250/637: Performed by: INTERNAL MEDICINE

## 2020-07-12 PROCEDURE — C9113 INJ PANTOPRAZOLE SODIUM, VIA: HCPCS | Performed by: INTERNAL MEDICINE

## 2020-07-12 PROCEDURE — 65270000029 HC RM PRIVATE

## 2020-07-12 PROCEDURE — 85025 COMPLETE CBC W/AUTO DIFF WBC: CPT

## 2020-07-12 PROCEDURE — 74011250636 HC RX REV CODE- 250/636: Performed by: HOSPITALIST

## 2020-07-12 PROCEDURE — 83735 ASSAY OF MAGNESIUM: CPT

## 2020-07-12 PROCEDURE — 74011636637 HC RX REV CODE- 636/637: Performed by: INTERNAL MEDICINE

## 2020-07-12 PROCEDURE — 82962 GLUCOSE BLOOD TEST: CPT

## 2020-07-12 PROCEDURE — 74011000258 HC RX REV CODE- 258: Performed by: INTERNAL MEDICINE

## 2020-07-12 RX ORDER — POTASSIUM CHLORIDE 20 MEQ/1
40 TABLET, EXTENDED RELEASE ORAL EVERY 4 HOURS
Status: DISPENSED | OUTPATIENT
Start: 2020-07-12 | End: 2020-07-12

## 2020-07-12 RX ORDER — POTASSIUM CHLORIDE 20 MEQ/1
40 TABLET, EXTENDED RELEASE ORAL
Status: DISCONTINUED | OUTPATIENT
Start: 2020-07-12 | End: 2020-07-12

## 2020-07-12 RX ORDER — INSULIN GLARGINE 100 [IU]/ML
7 INJECTION, SOLUTION SUBCUTANEOUS DAILY
Status: DISCONTINUED | OUTPATIENT
Start: 2020-07-13 | End: 2020-07-14

## 2020-07-12 RX ORDER — POTASSIUM CHLORIDE 7.45 MG/ML
10 INJECTION INTRAVENOUS
Status: COMPLETED | OUTPATIENT
Start: 2020-07-12 | End: 2020-07-12

## 2020-07-12 RX ADMIN — INSULIN GLARGINE 5 UNITS: 100 INJECTION, SOLUTION SUBCUTANEOUS at 09:21

## 2020-07-12 RX ADMIN — POTASSIUM CHLORIDE 40 MEQ: 1500 TABLET, EXTENDED RELEASE ORAL at 11:26

## 2020-07-12 RX ADMIN — INSULIN LISPRO 4 UNITS: 100 INJECTION, SOLUTION INTRAVENOUS; SUBCUTANEOUS at 17:30

## 2020-07-12 RX ADMIN — INSULIN LISPRO 2 UNITS: 100 INJECTION, SOLUTION INTRAVENOUS; SUBCUTANEOUS at 13:24

## 2020-07-12 RX ADMIN — POTASSIUM CHLORIDE 10 MEQ: 10 INJECTION, SOLUTION INTRAVENOUS at 11:25

## 2020-07-12 RX ADMIN — MELATONIN 12 MG: at 22:12

## 2020-07-12 RX ADMIN — POTASSIUM CHLORIDE 10 MEQ: 10 INJECTION, SOLUTION INTRAVENOUS at 10:24

## 2020-07-12 RX ADMIN — POTASSIUM CHLORIDE 10 MEQ: 10 INJECTION, SOLUTION INTRAVENOUS at 08:21

## 2020-07-12 RX ADMIN — METOPROLOL TARTRATE 50 MG: 50 TABLET, FILM COATED ORAL at 09:23

## 2020-07-12 RX ADMIN — GUAIFENESIN 600 MG: 600 TABLET, EXTENDED RELEASE ORAL at 09:23

## 2020-07-12 RX ADMIN — TAMSULOSIN HYDROCHLORIDE 0.4 MG: 0.4 CAPSULE ORAL at 09:23

## 2020-07-12 RX ADMIN — ONDANSETRON 4 MG: 2 INJECTION INTRAMUSCULAR; INTRAVENOUS at 22:21

## 2020-07-12 RX ADMIN — METOPROLOL TARTRATE 50 MG: 50 TABLET, FILM COATED ORAL at 17:38

## 2020-07-12 RX ADMIN — AMLODIPINE BESYLATE 10 MG: 10 TABLET ORAL at 09:23

## 2020-07-12 RX ADMIN — SODIUM CHLORIDE 150 ML/HR: 900 INJECTION, SOLUTION INTRAVENOUS at 17:28

## 2020-07-12 RX ADMIN — ONDANSETRON 4 MG: 2 INJECTION INTRAMUSCULAR; INTRAVENOUS at 07:47

## 2020-07-12 RX ADMIN — CEFTRIAXONE 1 G: 1 INJECTION, POWDER, FOR SOLUTION INTRAMUSCULAR; INTRAVENOUS at 13:25

## 2020-07-12 RX ADMIN — POTASSIUM CHLORIDE 10 MEQ: 10 INJECTION, SOLUTION INTRAVENOUS at 09:21

## 2020-07-12 RX ADMIN — PANTOPRAZOLE SODIUM 40 MG: 40 INJECTION, POWDER, FOR SOLUTION INTRAVENOUS at 22:11

## 2020-07-12 RX ADMIN — GUAIFENESIN 600 MG: 600 TABLET, EXTENDED RELEASE ORAL at 22:14

## 2020-07-12 RX ADMIN — INSULIN LISPRO 2 UNITS: 100 INJECTION, SOLUTION INTRAVENOUS; SUBCUTANEOUS at 00:38

## 2020-07-12 RX ADMIN — POTASSIUM CHLORIDE 40 MEQ: 1500 TABLET, EXTENDED RELEASE ORAL at 07:42

## 2020-07-12 RX ADMIN — LATANOPROST 1 DROP: 50 SOLUTION OPHTHALMIC at 22:36

## 2020-07-12 RX ADMIN — INSULIN LISPRO 2 UNITS: 100 INJECTION, SOLUTION INTRAVENOUS; SUBCUTANEOUS at 22:12

## 2020-07-12 RX ADMIN — AZITHROMYCIN MONOHYDRATE 250 MG: 500 INJECTION, POWDER, LYOPHILIZED, FOR SOLUTION INTRAVENOUS at 04:30

## 2020-07-12 NOTE — PROGRESS NOTES
[facility] in bed, alert and oriented x 4. Not in any form of distress. Denies pain. Frequent use items and call bell within reach. Verbalized understanding to call for assistance. Bed locked in lowest position. Patient resting. Not in any form of distress throughout the night.

## 2020-07-12 NOTE — ROUTINE PROCESS
Bedside and Verbal shift change report given to Caryle Duffel, RN (oncoming nurse) by Linda Or RN, BSN (offgoing nurse). Report given with SBAR, Kardex, Intake/Output, MAR and Recent Results.

## 2020-07-12 NOTE — ROUTINE PROCESS
Bedside and Verbal shift change report given to Melissa Flores, RN (oncoming nurse) by Ross Garcia RN, BSN (offgoing nurse). Report given with SBAR, Kardex, Intake/Output, MAR and Recent Results.

## 2020-07-12 NOTE — PROGRESS NOTES
conducted an initial consultation and Spiritual Assessment for Consuelo, who is a 75 y.o.,male. Patients Primary Language is: [place]. According to the patients EMR Confucianist Affiliation is: Benedetta Hatchet. The reason the Patient came to the hospital is:   Patient Active Problem List    Diagnosis Date Noted    Hyponatremia 07/11/2020    Hyperglycemia 07/11/2020    Stage 3 acute kidney injury ([facility] [address] 07/11/2020    Community acquired pneumonia of right middle lobe of lung 07/11/2020    Former smoker 07/11/2020    BPH (benign prostatic hyperplasia) 07/11/2020    Frequency of micturition     Incomplete bladder emptying     Urgency of micturition     Hypogonadism in male 03/01/2017    Nocturia 03/01/2017    Urge incontinence 03/01/2017    Effusion of knee 07/27/2016    Erectile dysfunction 06/27/2016    Enlarged heart     Coronary artery disease     Musculoskeletal pain     Wrist joint pain     Arthritis     Benign hypertension     Impotence     Malignant hypertension 09/23/2013    Chest pain 06/21/2013    Syncope 06/21/2013    Essential hypertension 06/21/2013    Type II diabetes mellitus ([facility] [address] 06/21/2013        The  provided the following Interventions:  Initiated a relationship of care and support. Explored issues of himanshu, spirituality and/or Gnosticism needs while hospitalized. Listened empathically. Provided chaplaincy education. Provided information about Spiritual Care Services. Offered prayer and assurance of continued prayers on patient's behalf. Chart reviewed. The following outcomes were achieved:  Patient shared some information about their medical narrative and spiritual journey/beliefs. Patient processed feeling about current hospitalization. Patient expressed gratitude for the 's visit. Assessment:  Patient did not indicate any spiritual or Gnosticism issues which require Spiritual Care Services interventions at this time.    Patient does not have any Church/cultural needs that will affect patients preferences in health care. Plan:  Chaplains will continue to follow and will provide pastoral care on an as needed or requested basis.  recommends bedside caregivers page  on duty if patient shows signs of acute spiritual or emotional distress.     [facility]   Staff [address] [facility]   [phone]

## 2020-07-12 NOTE — PROGRESS NOTES
Problem: Discharge Planning  Goal: *Discharge to safe environment  Outcome: Progressing Towards Goal    PLAN : Home    Reason for Admission:   Stage II PRADEEP, Hyperglycemia                   RUR Score:    N/A                 Plan for utilizing home health:   Unlikely       PCP: First and Last name:  Luca Reyes   Name of Practice:    Are you a current patient: Yes/No:  Renate   Approximate date of last visit:  7/3/2020   Can you participate in a virtual visit with your PCP:                     Current Advanced Directive/Advance Care Plan:  Does not have an AMD and not currently interested                         Transition of Care Plan:     Home    Met with patient at ED bedside. Pt verified face sheet and is correct. Legal NOK: Spouse,  Chris Emanuel. Insurance: [id number]  Medicaid. Rx  used: [facility](on oral meds for blood sugar). Pt lives with his spouse. Pt was independent with ambulation/ADLs. DME: cane(uses sometimes) No prior services. Pt did not want to appoint anyone to receive DC instructions indicating \"I take care of my own business\". PLAN: home-will need [facility] cab or [facility]FT for transport home. Patient has designated__noone___  to participate in his/her discharge plan and to receive any needed information.      Name:  Phone number:          Care Management Interventions  PCP Verified by CM: Yes([employer] [employer])  Last Visit to PCP: 07/03/20  Mode of Transport at Discharge: ALS(Medicaid cab or LYFT)  Transition of Care Consult (CM Consult): Discharge Planning  Current Support Network: Lives with Spouse  Confirm Follow Up Transport: Self  The Patient and/or Patient Representative was Provided with a Choice of Provider and Agrees with the Discharge Plan?: No  Freedom of Choice List was Provided with Basic Dialogue that Supports the Patient's Individualized Plan of Care/Goals, Treatment Preferences and Shares the Quality Data Associated with the Providers?: No  Discharge Location  Discharge Placement: Yaquelin Bolanos RN    Outcomes Manager  [phone]  [phone]

## 2020-07-12 NOTE — PROGRESS NOTES
Problem: Discharge Planning  Goal: *Discharge to safe environment  Outcome: Progressing Towards Goal    PLAN : Home

## 2020-07-12 NOTE — PROGRESS NOTES
Patient received in bed awake. Patient A&Ox4, denies pain and discomfort. No distress noted. Frequently use items within reach. Bed locked in low position. Call bell within reach and Patient verbalized understanding of use for assistance and needs. 0713-  Patient c/o nausea; see MAR for prn Zofran IV to be given. Call bell w/in reach. 4230- This nurse present when Dr. Raffy García arrived to bedside. V.O. received for Potassium Chloride 10 meq times 4 minibags IV and Patient to only receive two doses of PO Potassium Cl 40 meq each doses (RBV)    0840- Patient awake. Denies having further nausea. Call bell w/in reach. 1126- Patient awake. Tolerating IV Potassium; cool pack available. Reassessment completed, no change in patient condition. Call bell w/in reach. 1530- Patient awake. Reassessment completed, no change in patient condition. Call bell w/in reach.

## 2020-07-12 NOTE — PROGRESS NOTES
Internal Medicine Progress Note    Patient's Name: Darlene Youssef Date: 7/10/2020  Length of Stay: 1      Assessment/Plan     Active Hospital Problems    Diagnosis Date Noted    Hyponatremia 07/11/2020    Hyperglycemia 07/11/2020    Stage 3 acute kidney injury ([facility] [address] 07/11/2020    Community acquired pneumonia of right middle lobe of lung 07/11/2020    Former smoker 07/11/2020    BPH (benign prostatic hyperplasia) 07/11/2020    Benign hypertension     Essential hypertension 06/21/2013    Type II diabetes mellitus ([facility] [address] 06/21/2013     - Cont IV fluids  - Cr trending down  - Replete lytes (low K+ may be causing GI sx)  - Cont IVAB for PNA  - F/u cult  - Cont acceptable home medications for chronic conditions   - DVT protocol    I have personally reviewed all pertinent labs and films that have officially resulted over the last 24 hours. I have personally checked for all pending labs that are awaiting final results.     Subjective     Pt s/e @ bedside  No major events overnight  Feeling nauseous today w/ some abd discomfort  Denies CP or SOB    Objective     Visit Vitals  BP (!) 162/91 (BP 1 Location: Left arm, BP Patient Position: At rest)   Pulse 94   Temp 98.2 °F (36.8 °C)   Resp 16   Ht 5' 10\" (1.778 m)   Wt 81.6 kg (180 lb)   SpO2 98%   BMI 25.83 kg/m²       Physical Exam:  General Appearance: NAD, conversant  Lungs: CTA with normal respiratory effort  CV: RRR, no m/r/g  Abdomen: soft, mild TTP, normal bowel sounds  Extremities: no cyanosis, no peripheral edema  Neuro: No focal deficits, motor/sensory intact    Lab/Data Reviewed:  BMP:   Lab Results   Component Value Date/Time     07/12/2020 03:50 AM    K 2.4 (LL) 07/12/2020 03:50 AM     07/12/2020 03:50 AM    CO2 27 07/12/2020 03:50 AM    AGAP 8 07/12/2020 03:50 AM     (H) 07/12/2020 03:50 AM     (H) 07/12/2020 03:50 AM    CREA 2.81 (H) 07/12/2020 03:50 AM    GFRAA 28 (L) 07/12/2020 03:50 AM    GFRNA 23 (L) 07/12/2020 03:50 AM CBC:   Lab Results   Component Value Date/Time    WBC 8.3 07/12/2020 03:50 AM    HGB 12.2 (L) 07/12/2020 03:50 AM    HCT 35.1 (L) 07/12/2020 03:50 AM     (L) 07/12/2020 03:50 AM       Imaging Reviewed:  No results found.     Medications Reviewed:  Current Facility-Administered Medications   Medication Dose Route Frequency    potassium chloride (K-DUR, KLOR-CON) SR tablet 40 mEq  40 mEq Oral Q4H    potassium chloride 10 mEq in 100 ml IVPB  10 mEq IntraVENous Q1H    ondansetron (ZOFRAN) injection 4 mg  4 mg IntraVENous Q4H PRN    0.9% sodium chloride infusion  150 mL/hr IntraVENous CONTINUOUS    tamsulosin (FLOMAX) capsule 0.4 mg  0.4 mg Oral DAILY    latanoprost (XALATAN) 0.005 % ophthalmic solution 1 Drop  1 Drop Left Eye QHS    metoprolol tartrate (LOPRESSOR) tablet 50 mg  50 mg Oral BID    amLODIPine (NORVASC) tablet 10 mg  10 mg Oral DAILY    insulin lispro (HUMALOG) injection   SubCUTAneous AC&HS    glucose chewable tablet 16 g  4 Tab Oral PRN    glucagon (GLUCAGEN) injection 1 mg  1 mg IntraMUSCular PRN    dextrose 10% infusion 125-250 mL  125-250 mL IntraVENous PRN    docusate sodium (COLACE) capsule 100 mg  100 mg Oral BID PRN    melatonin tablet 12 mg  12 mg Oral QHS PRN    albuterol-ipratropium (DUO-NEB) 2.5 MG-0.5 MG/3 ML  3 mL Nebulization Q4H PRN    pantoprazole (PROTONIX) injection 40 mg  40 mg IntraVENous DAILY PRN    acetaminophen (TYLENOL) tablet 650 mg  650 mg Oral Q6H PRN    cefTRIAXone (ROCEPHIN) 1 g in 0.9% sodium chloride (MBP/ADV) 50 mL MBP  1 g IntraVENous Q24H    azithromycin (ZITHROMAX) 250 mg in 0.9% sodium chloride 250 mL IVPB  250 mg IntraVENous Q24H    guaiFENesin ER (MUCINEX) tablet 600 mg  600 mg Oral Q12H    insulin glargine (LANTUS) injection 5 Units  5 Units SubCUTAneous DAILY           Johann Dorantes DO  Internal Medicine, Hospitalist  Pager: [phone] [facility]

## 2020-07-13 ENCOUNTER — APPOINTMENT (OUTPATIENT)
Dept: GENERAL RADIOLOGY | Age: 65
DRG: 229 | End: 2020-07-13
Attending: INTERNAL MEDICINE
Payer: MEDICAID

## 2020-07-13 LAB
ANION GAP SERPL CALC-SCNC: 5 MMOL/L (ref 3–18)
BUN SERPL-MCNC: 67 MG/DL (ref 7–18)
BUN/CREAT SERPL: 36 (ref 12–20)
CALCIUM SERPL-MCNC: 8.2 MG/DL (ref 8.5–10.1)
CHLORIDE SERPL-SCNC: 113 MMOL/L (ref 100–111)
CO2 SERPL-SCNC: 30 MMOL/L (ref 21–32)
CREAT SERPL-MCNC: 1.84 MG/DL (ref 0.6–1.3)
GLUCOSE BLD STRIP.AUTO-MCNC: 139 MG/DL (ref 70–110)
GLUCOSE BLD STRIP.AUTO-MCNC: 149 MG/DL (ref 70–110)
GLUCOSE BLD STRIP.AUTO-MCNC: 189 MG/DL (ref 70–110)
GLUCOSE BLD STRIP.AUTO-MCNC: 213 MG/DL (ref 70–110)
GLUCOSE SERPL-MCNC: 139 MG/DL (ref 74–99)
PERIPHERAL SMEAR,PSM: NORMAL
POTASSIUM SERPL-SCNC: 3 MMOL/L (ref 3.5–5.5)
SODIUM SERPL-SCNC: 148 MMOL/L (ref 136–145)

## 2020-07-13 PROCEDURE — 65270000029 HC RM PRIVATE

## 2020-07-13 PROCEDURE — 74011000258 HC RX REV CODE- 258: Performed by: INTERNAL MEDICINE

## 2020-07-13 PROCEDURE — 82962 GLUCOSE BLOOD TEST: CPT

## 2020-07-13 PROCEDURE — 74011636637 HC RX REV CODE- 636/637: Performed by: INTERNAL MEDICINE

## 2020-07-13 PROCEDURE — 74011636637 HC RX REV CODE- 636/637: Performed by: HOSPITALIST

## 2020-07-13 PROCEDURE — 74011250636 HC RX REV CODE- 250/636: Performed by: INTERNAL MEDICINE

## 2020-07-13 PROCEDURE — 74011250637 HC RX REV CODE- 250/637: Performed by: INTERNAL MEDICINE

## 2020-07-13 PROCEDURE — 74018 RADEX ABDOMEN 1 VIEW: CPT

## 2020-07-13 PROCEDURE — 80048 BASIC METABOLIC PNL TOTAL CA: CPT

## 2020-07-13 PROCEDURE — 36415 COLL VENOUS BLD VENIPUNCTURE: CPT

## 2020-07-13 RX ORDER — SODIUM CHLORIDE 450 MG/100ML
150 INJECTION, SOLUTION INTRAVENOUS CONTINUOUS
Status: DISCONTINUED | OUTPATIENT
Start: 2020-07-13 | End: 2020-07-15

## 2020-07-13 RX ORDER — PROMETHAZINE HYDROCHLORIDE 25 MG/ML
25 INJECTION, SOLUTION INTRAMUSCULAR; INTRAVENOUS
Status: DISCONTINUED | OUTPATIENT
Start: 2020-07-13 | End: 2020-07-21 | Stop reason: HOSPADM

## 2020-07-13 RX ORDER — POLYETHYLENE GLYCOL 3350 17 G/17G
17 POWDER, FOR SOLUTION ORAL DAILY
Status: DISCONTINUED | OUTPATIENT
Start: 2020-07-13 | End: 2020-07-17

## 2020-07-13 RX ORDER — HYDRALAZINE HYDROCHLORIDE 20 MG/ML
10 INJECTION INTRAMUSCULAR; INTRAVENOUS
Status: DISCONTINUED | OUTPATIENT
Start: 2020-07-13 | End: 2020-07-21 | Stop reason: HOSPADM

## 2020-07-13 RX ADMIN — CEFTRIAXONE 1 G: 1 INJECTION, POWDER, FOR SOLUTION INTRAMUSCULAR; INTRAVENOUS at 14:04

## 2020-07-13 RX ADMIN — SODIUM CHLORIDE 100 ML/HR: 450 INJECTION, SOLUTION INTRAVENOUS at 15:53

## 2020-07-13 RX ADMIN — AMLODIPINE BESYLATE 10 MG: 10 TABLET ORAL at 09:04

## 2020-07-13 RX ADMIN — INSULIN GLARGINE 7 UNITS: 100 INJECTION, SOLUTION SUBCUTANEOUS at 09:04

## 2020-07-13 RX ADMIN — INSULIN LISPRO 2 UNITS: 100 INJECTION, SOLUTION INTRAVENOUS; SUBCUTANEOUS at 13:00

## 2020-07-13 RX ADMIN — METOPROLOL TARTRATE 50 MG: 50 TABLET, FILM COATED ORAL at 17:53

## 2020-07-13 RX ADMIN — POLYETHYLENE GLYCOL 3350 17 G: 17 POWDER, FOR SOLUTION ORAL at 15:53

## 2020-07-13 RX ADMIN — LATANOPROST 1 DROP: 50 SOLUTION OPHTHALMIC at 22:00

## 2020-07-13 RX ADMIN — INSULIN LISPRO 4 UNITS: 100 INJECTION, SOLUTION INTRAVENOUS; SUBCUTANEOUS at 17:52

## 2020-07-13 RX ADMIN — AZITHROMYCIN MONOHYDRATE 250 MG: 500 INJECTION, POWDER, LYOPHILIZED, FOR SOLUTION INTRAVENOUS at 03:00

## 2020-07-13 RX ADMIN — METOPROLOL TARTRATE 50 MG: 50 TABLET, FILM COATED ORAL at 09:04

## 2020-07-13 RX ADMIN — ONDANSETRON 4 MG: 2 INJECTION INTRAMUSCULAR; INTRAVENOUS at 22:06

## 2020-07-13 RX ADMIN — SODIUM CHLORIDE 150 ML/HR: 900 INJECTION, SOLUTION INTRAVENOUS at 09:06

## 2020-07-13 RX ADMIN — TAMSULOSIN HYDROCHLORIDE 0.4 MG: 0.4 CAPSULE ORAL at 09:04

## 2020-07-13 RX ADMIN — SODIUM CHLORIDE 150 ML/HR: 900 INJECTION, SOLUTION INTRAVENOUS at 00:40

## 2020-07-13 NOTE — PROGRESS NOTES
Discharge/Transition Planning  Problem: Discharge Planning  Goal: *Discharge to safe environment  Outcome: Progressing Towards Goal     PLAN : Home     Care Management following and chart reviewed.  Plan is Home and will need Medicaid transport called for cab    Melody HUSAINN  Outcomes Manager  Office # [phone]  Pager # [phone]

## 2020-07-13 NOTE — PROGRESS NOTES
Internal Medicine Progress Note        NAME: Obdulio Ware   :  1955  MRM:  [id number]    Date/Time: 2020        ASSESSMENT/PLAN:    Principal Problem:    Stage 3 acute kidney injury ([facility] [address] (2020)    Active Problems:    Essential hypertension (2013)      Type II diabetes mellitus ([facility] [address] (2013)      Benign hypertension ()      Hyponatremia (2020)      Hyperglycemia (2020)      Community acquired pneumonia of right middle lobe of lung (2020)      Former smoker (2020)      BPH (benign prostatic hyperplasia) (2020)      # RML pneumonia. Continue IVF. Follow CXR. Maintain O2.     - F/u cult    # Lower abd pains. Constipation . Poor appetite and eating. Possible ileus : Follow KUB. Laxative. IVF. # PRADEEP/CKD3 . Improved with hydration. Monitor Renal function and other labs as indicated. Avoid nephrotoxins , iv Contrast, NSAID. Renally dosing medications. Monitor urine out put. # DM. Provide SSI, hypoglycemia protocol and frequent Accu checks. Provide SSI, hypoglycemia protocol and frequent Accu checks. Education,  diabetic educator  . Diabetic Diet     # TCP. Observe    # Nausea not controlled by zofran. Add phenergan     # Mild hypernatremia. Change ivf     # Hypokalemia.  Replete and trend     # PT        - Cont acceptable home medications for chronic conditions   - DVT protocol    IP CONSULT TO HOSPITALIST    Lab Review:     Recent Labs     20  0350 07/10/20  2215   WBC 8.3 5.7   HGB 12.2* 12.4*   HCT 35.1* 35.0*   * 112*     Recent Labs     20  03520  0350 07/10/20  2215   * 142 133*   K 3.0* 2.4* 3.0*   * 107 95*   CO2 30 27 22   * 148* 237*   BUN 67* 104* 133*   CREA 1.84* 2.81* 5.12*   CA 8.2* 7.8* 7.0*   MG  --  2.6  --    ALB  --  2.1* 2.4*   TBILI  --  3.0* 3.4*   ALT  --  53 64*     Lab Results   Component Value Date/Time    Glucose (POC) 149 (H) 2020 07:30 AM    Glucose (POC) 196 (H) 2020 09:41 PM    Glucose (POC) 203 (H) 07/12/2020 04:32 PM    Glucose (POC) 166 (H) 07/12/2020 11:39 AM    Glucose (POC) 143 (H) 07/12/2020 08:39 AM     No results for input(s): PH, PCO2, PO2, HCO3, FIO2 in the last 72 hours. No results for input(s): INR, INREXT in the last 72 hours. No results found for: SDES  Lab Results   Component Value Date/Time    Culture result: NO GROWTH 2 DAYS 07/11/2020 04:04 PM    Culture result: NO GROWTH 2 DAYS 07/11/2020 04:04 PM      All Cardiac Markers in the last 24 hours: No results found for: CPK, CK, CKMMB, CKMB, RCK3, CKMBT, CKNDX, CKND1, TRE, TROPT, TROIQ, YOUSUF, TROPT, TNIPOC, BNP, BNPP      Intervals noted   Pt s/e @ bedside     Subjective:     Chief Complaint:      Lower abd pain discomfort   Nausea not controlled by zofran. ROS:  (bold if positive,otherwise negative)    Fever/chills ,  Dysuria   Cough , Sputum , SOB/HUNT , Chest Pain     Diarrhea ,Nausea/Vomit , Abd Pain , Constipation            Objective:     Vitals:  Last 24hrs VS reviewed since prior progress note. Most recent are:    Visit Vitals  BP (!) 167/93   Pulse 81   Temp 98.6 °F (37 °C)   Resp 18   Ht 5' 10\" (1.778 m)   Wt 86.4 kg (190 lb 7.6 oz)   SpO2 100%   BMI 27.33 kg/m²     SpO2 Readings from Last 6 Encounters:   07/13/20 100%   06/30/20 98%   12/22/19 97%   07/16/19 96%   05/13/19 93%   04/16/19 98%            Intake/Output Summary (Last 24 hours) at 7/13/2020 0948  Last data filed at 7/13/2020 9120  Gross per 24 hour   Intake 3202.5 ml   Output 3050 ml   Net 152.5 ml          Physical Exam:   General Appearance: NAD, conversant  Lungs: CTA with normal respiratory effort  CV: RRR, no m/r/g  Abdomen: soft, mild TTP LOWER AREAS , normal bowel sounds  Extremities: no cyanosis, no peripheral edema  Neuro: No focal deficits, motor/sensory intact.      Medications Reviewed: (see below)    Lab Data Reviewed: (see below)    ______________________________________________________________________    Medications: Current Facility-Administered Medications   Medication Dose Route Frequency    polyethylene glycol (MIRALAX) packet 17 g  17 g Oral DAILY    promethazine (PHENERGAN) injection 25 mg  25 mg IntraMUSCular Q6H PRN    insulin glargine (LANTUS) injection 7 Units  7 Units SubCUTAneous DAILY    ondansetron (ZOFRAN) injection 4 mg  4 mg IntraVENous Q4H PRN    0.9% sodium chloride infusion  150 mL/hr IntraVENous CONTINUOUS    tamsulosin (FLOMAX) capsule 0.4 mg  0.4 mg Oral DAILY    latanoprost (XALATAN) 0.005 % ophthalmic solution 1 Drop  1 Drop Left Eye QHS    metoprolol tartrate (LOPRESSOR) tablet 50 mg  50 mg Oral BID    amLODIPine (NORVASC) tablet 10 mg  10 mg Oral DAILY    insulin lispro (HUMALOG) injection   SubCUTAneous AC&HS    glucose chewable tablet 16 g  4 Tab Oral PRN    glucagon (GLUCAGEN) injection 1 mg  1 mg IntraMUSCular PRN    dextrose 10% infusion 125-250 mL  125-250 mL IntraVENous PRN    docusate sodium (COLACE) capsule 100 mg  100 mg Oral BID PRN    melatonin tablet 12 mg  12 mg Oral QHS PRN    albuterol-ipratropium (DUO-NEB) 2.5 MG-0.5 MG/3 ML  3 mL Nebulization Q4H PRN    pantoprazole (PROTONIX) injection 40 mg  40 mg IntraVENous DAILY PRN    acetaminophen (TYLENOL) tablet 650 mg  650 mg Oral Q6H PRN    cefTRIAXone (ROCEPHIN) 1 g in 0.9% sodium chloride (MBP/ADV) 50 mL MBP  1 g IntraVENous Q24H    azithromycin (ZITHROMAX) 250 mg in 0.9% sodium chloride 250 mL IVPB  250 mg IntraVENous Q24H          Total time spent with patient: 35 minutes                  Care Plan discussed with: Patient, Care Manager, Nursing Staff and >50% of time spent in counseling and coordination of care    Discussed:  Care Plan    Prophylaxis:  SCD's    Disposition:  Home w/Family           This document in whole or part of it has been produced using voice recognition software. Unrecognized errors in transcription may be present.     Attending Physician: Keisha Heller MD

## 2020-07-13 NOTE — PROGRESS NOTES
0800  Nauseated but no emesis, no pain so far at this time. 1100  Been up  Walking in the room,  BP elevated, Dr Cornelio Chan aware no prn but his  Morning  HTN meds is given    1700  Ice  Pack per pt request for abdominal area, given laxative, as ordered.

## 2020-07-13 NOTE — PROGRESS NOTES
-- Bedside, Verbal and Written shift change report given to [facility] (oncoming nurse) by Jayjay Abarca RN (offgoing nurse). Report included the following information SBAR, Kardex, Intake/Output, MAR and Recent Results. Pt in bed, alert, no distress noted, frequentley used items within reach. 2212 -- PM medications administered, pt tolerated with ease, will continue to monitor. 2221--Pt states he is nauseous, PRN Zofran administered. 0128-- Shift reassessment, pt condition unchanged, will continue to monitor. 0522 --  Shift reassessment, pt sleeping between care, will continue to monitor. -- Bedside, Verbal and Written shift change report given to 83 KEARA Huston (oncoming nurse) by Meliza Macedo RN (offgoing nurse). Report included the following information SBAR, Kardex, Intake/Output, MAR and Recent Results. Skin assessment completed.

## 2020-07-13 NOTE — DIABETES MGMT
NUTRITIONAL ASSESSMENT GLYCEMIC CONTROL/ PLAN OF CARE     Consuelo           75 y.o.           7/10/2020                 1. Acute renal failure, unspecified acute renal failure type ([address]    2. Hypokalemia    3. Pneumonia of right middle lobe due to infectious organism    T2DM   INTERVENTIONS/PLAN:   1. Implement preferences and tolerances. 2.  Mechanical soft diet with chopped whole meats or soft entree. No beef or pork. 3.  Suggest 7 units Lantus/day (and corrective lispro ACHS as needed). 4.  Monitor po intake, labs and weights. ASSESSMENT:   Nutritional Status:  Pt is 115% ideal weight - BMI (calculated): 27.3 kg/m2 (overweight classification). Pt reports 20 lb weight loss over past year but current weight does not support this. Current po intake is poor (KUB results pending; Miralax ordered). Nutrition Diagnoses:   Inadequate oral food and beverage intake due to poor appetite (r/o ileus) as evidenced by po intake < 25% meals. Altered nutrition related labs due to T2DM as evidenced by A1C of 7.4%. Difficulty chewing due to being edentulous as evidenced by pt unable to wear dentures and reports needing texture alterations. Diabetes Management:   Pt reports he once took Lantus but his MD stopped it because \"I didn't need it\". He states he still takes oral agents as listed below.   Recent blood glucose:    7/13:  149, 189  7/12:  166, 143, 203, 196 - received 13 units insulin   Within target range (non-ICU: <140; ICU<180): [] Yes   []  No  varied    Current Insulin regimen:   5 units Lantus/day  Corrective lispro, normal insulin sensitivity ACHS  Home medication/insulin regimen:   Metformin 500 mg 2 tablets BID  Januvia 100 mg daily  HbA1c: 7.4% - ave BG ~ 166 mg/dL over past 3 months  Adequate glycemic control PTA:  [x] Yes  [] No    SUBJECTIVE/OBJECTIVE:   Information obtained from: chart review, pt  Pt reports 20 lb weight loss over past year (states his usual weight was 200 lbs last summer and it is not down to 180 lbs). He states he was not eating well at home PTA x 3 days due to nausea and reports he is still experiencing nausea. He denies having known food allergies however he does not eat beef or pork. He reports difficulty with chewing due to not being able to wear his dentures (has both upper and lower dentures with him but he states they do not fit).      Diet: consistent CHO    Patient Vitals for the past 100 hrs:   % Diet Eaten   07/13/20 0906 25 %   07/11/20 1300 20 %     Medications: [x]                Reviewed   Pertinent:  Miralax  IVF:  1/2 NS at 100 ml/hr    Most Recent POC Glucose:   Recent Labs     07/13/20  0355 07/12/20  0350 07/10/20  2215   * 148* 237*         Labs:   Lab Results   Component Value Date/Time    Hemoglobin A1c 7.4 (H) 06/16/2020 12:28 PM     Lab Results   Component Value Date/Time    Sodium 148 (H) 07/13/2020 03:55 AM    Potassium 3.0 (L) 07/13/2020 03:55 AM    Chloride 113 (H) 07/13/2020 03:55 AM    CO2 30 07/13/2020 03:55 AM    Anion gap 5 07/13/2020 03:55 AM    Glucose 139 (H) 07/13/2020 03:55 AM    BUN 67 (H) 07/13/2020 03:55 AM    Creatinine 1.84 (H) 07/13/2020 03:55 AM    Calcium 8.2 (L) 07/13/2020 03:55 AM    Magnesium 2.6 07/12/2020 03:50 AM    Albumin 2.1 (L) 07/12/2020 03:50 AM       Anthropometrics: IBW : 75.3 kg (166 lb),  , BMI (calculated): 27.3  Wt Readings from Last 1 Encounters:   07/13/20 86.4 kg (190 lb 7.6 oz)        Last Weight Metrics:  Weight Loss Metrics 7/13/2020 6/30/2020 5/6/2020 12/22/2019 10/28/2019 9/24/2019 7/25/2019   Today's Wt 190 lb 7.6 oz 180 lb 185 lb 188 lb 193 lb 193 lb 193 lb   BMI 27.33 kg/m2 25.83 kg/m2 26.54 kg/m2 26.98 kg/m2 27.69 kg/m2 27.69 kg/m2 27.69 kg/m2       Ht Readings from Last 1 Encounters:   07/10/20 5' 10\" (1.778 m)     Estimated Nutrition Needs:  2161 Kcals/day, Protein (g): 86 g Fluid (ml): 2200 ml  Based on:   [x]          Actual BW    []          ABW   []            Adjusted BW         Nutrition Interventions:  Implement preferences/tolerances  Mechanical soft diet, chopped meats or soft entree  2200 calorie consistent CHO diet  Goal:   Blood glucose will be within target range of  mg/dL by 7/16/20. Pt will consume > 75% meals by 7/18/20.        Nutrition Monitoring and Evaluation      [x]     Monitor po intake on meal rounds  [x]     Continue inpatient monitoring and intervention  []     Other:      Nutrition Risk:  []   High     [x]  Moderate    []  Minimal/Uncompromised    Israel Ivey RD, CDE   Office:  [address] [place] [address] Pager:  [phone]

## 2020-07-13 NOTE — PROGRESS NOTES
Problem: Diabetes Self-Management  Goal: *Disease process and treatment process  Description: Define diabetes and identify own type of diabetes; list 3 options for treating diabetes. Outcome: Progressing Towards Goal  Goal: *Incorporating nutritional management into lifestyle  Description: Describe effect of type, amount and timing of food on blood glucose; list 3 methods for planning meals. Outcome: Progressing Towards Goal  Goal: *Incorporating physical activity into lifestyle  Description: State effect of exercise on blood glucose levels. Outcome: Progressing Towards Goal  Goal: *Developing strategies to promote health/change behavior  Description: Define the ABC's of diabetes; identify appropriate screenings, schedule and personal plan for screenings. Outcome: Progressing Towards Goal  Goal: *Using medications safely  Description: State effect of diabetes medications on diabetes; name diabetes medication taking, action and side effects. Outcome: Progressing Towards Goal  Goal: *Monitoring blood glucose, interpreting and using results  Description: Identify recommended blood glucose targets  and personal targets. Outcome: Progressing Towards Goal  Goal: *Prevention, detection, treatment of acute complications  Description: List symptoms of hyper- and hypoglycemia; describe how to treat low blood sugar and actions for lowering  high blood glucose level. Outcome: Progressing Towards Goal  Goal: *Prevention, detection and treatment of chronic complications  Description: Define the natural course of diabetes and describe the relationship of blood glucose levels to long term complications of diabetes.   Outcome: Progressing Towards Goal  Goal: *Developing strategies to address psychosocial issues  Description: Describe feelings about living with diabetes; identify support needed and support network  Outcome: Progressing Towards Goal  Goal: *Insulin pump training  Outcome: Progressing Towards Goal  Goal: *Sick day guidelines  Outcome: Progressing Towards Goal  Goal: *Patient Specific Goal (EDIT GOAL, INSERT TEXT)  Outcome: Progressing Towards Goal     Problem: Patient Education: Go to Patient Education Activity  Goal: Patient/Family Education  Outcome: Progressing Towards Goal     Problem: Falls - Risk of  Goal: *Absence of Falls  Description: Document Indio Gracia Fall Risk and appropriate interventions in the flowsheet.   Outcome: Progressing Towards Goal  Note: Fall Risk Interventions:  Mobility Interventions: Communicate number of staff needed for ambulation/transfer, Patient to call before getting OOB, Bed/chair exit alarm, Utilize walker, cane, or other assistive device         Medication Interventions: Evaluate medications/consider consulting pharmacy, Patient to call before getting OOB, Bed/chair exit alarm    Elimination Interventions: Bed/chair exit alarm, Call light in reach, Patient to call for help with toileting needs, Stay With Me (per policy), Urinal in reach              Problem: Patient Education: Go to Patient Education Activity  Goal: Patient/Family Education  Outcome: Progressing Towards Goal     Problem: Discharge Planning  Goal: *Discharge to safe environment  Outcome: Progressing Towards Goal

## 2020-07-14 ENCOUNTER — APPOINTMENT (OUTPATIENT)
Dept: GENERAL RADIOLOGY | Age: 65
DRG: 229 | End: 2020-07-14
Attending: INTERNAL MEDICINE
Payer: MEDICAID

## 2020-07-14 LAB
ANION GAP SERPL CALC-SCNC: 4 MMOL/L (ref 3–18)
BUN SERPL-MCNC: 39 MG/DL (ref 7–18)
BUN/CREAT SERPL: 30 (ref 12–20)
CALCIUM SERPL-MCNC: 8.8 MG/DL (ref 8.5–10.1)
CHLORIDE SERPL-SCNC: 112 MMOL/L (ref 100–111)
CO2 SERPL-SCNC: 31 MMOL/L (ref 21–32)
CREAT SERPL-MCNC: 1.3 MG/DL (ref 0.6–1.3)
GLUCOSE BLD STRIP.AUTO-MCNC: 111 MG/DL (ref 70–110)
GLUCOSE BLD STRIP.AUTO-MCNC: 116 MG/DL (ref 70–110)
GLUCOSE BLD STRIP.AUTO-MCNC: 131 MG/DL (ref 70–110)
GLUCOSE BLD STRIP.AUTO-MCNC: 208 MG/DL (ref 70–110)
GLUCOSE SERPL-MCNC: 179 MG/DL (ref 74–99)
POTASSIUM SERPL-SCNC: 3 MMOL/L (ref 3.5–5.5)
SODIUM SERPL-SCNC: 147 MMOL/L (ref 136–145)

## 2020-07-14 PROCEDURE — 65270000029 HC RM PRIVATE

## 2020-07-14 PROCEDURE — 74011000258 HC RX REV CODE- 258: Performed by: INTERNAL MEDICINE

## 2020-07-14 PROCEDURE — 74011636637 HC RX REV CODE- 636/637: Performed by: INTERNAL MEDICINE

## 2020-07-14 PROCEDURE — 74011250637 HC RX REV CODE- 250/637: Performed by: INTERNAL MEDICINE

## 2020-07-14 PROCEDURE — 74011250636 HC RX REV CODE- 250/636: Performed by: INTERNAL MEDICINE

## 2020-07-14 PROCEDURE — 36415 COLL VENOUS BLD VENIPUNCTURE: CPT

## 2020-07-14 PROCEDURE — 74011636637 HC RX REV CODE- 636/637: Performed by: HOSPITALIST

## 2020-07-14 PROCEDURE — C9113 INJ PANTOPRAZOLE SODIUM, VIA: HCPCS | Performed by: INTERNAL MEDICINE

## 2020-07-14 PROCEDURE — 74018 RADEX ABDOMEN 1 VIEW: CPT

## 2020-07-14 PROCEDURE — 80048 BASIC METABOLIC PNL TOTAL CA: CPT

## 2020-07-14 PROCEDURE — 82962 GLUCOSE BLOOD TEST: CPT

## 2020-07-14 PROCEDURE — 71046 X-RAY EXAM CHEST 2 VIEWS: CPT

## 2020-07-14 RX ORDER — INSULIN GLARGINE 100 [IU]/ML
10 INJECTION, SOLUTION SUBCUTANEOUS DAILY
Status: DISCONTINUED | OUTPATIENT
Start: 2020-07-15 | End: 2020-07-15

## 2020-07-14 RX ORDER — INSULIN GLARGINE 100 [IU]/ML
3 INJECTION, SOLUTION SUBCUTANEOUS ONCE
Status: COMPLETED | OUTPATIENT
Start: 2020-07-14 | End: 2020-07-14

## 2020-07-14 RX ORDER — NIFEDIPINE 30 MG/1
90 TABLET, EXTENDED RELEASE ORAL DAILY
Status: DISCONTINUED | OUTPATIENT
Start: 2020-07-15 | End: 2020-07-21 | Stop reason: HOSPADM

## 2020-07-14 RX ORDER — FACIAL-BODY WIPES
10 EACH TOPICAL ONCE
Status: COMPLETED | OUTPATIENT
Start: 2020-07-14 | End: 2020-07-14

## 2020-07-14 RX ORDER — FUROSEMIDE 20 MG/1
20 TABLET ORAL DAILY
Status: DISCONTINUED | OUTPATIENT
Start: 2020-07-15 | End: 2020-07-14

## 2020-07-14 RX ORDER — POTASSIUM CHLORIDE 7.45 MG/ML
10 INJECTION INTRAVENOUS
Status: COMPLETED | OUTPATIENT
Start: 2020-07-14 | End: 2020-07-14

## 2020-07-14 RX ORDER — CLONIDINE HYDROCHLORIDE 0.1 MG/1
0.1 TABLET ORAL 2 TIMES DAILY
Status: DISCONTINUED | OUTPATIENT
Start: 2020-07-14 | End: 2020-07-21 | Stop reason: HOSPADM

## 2020-07-14 RX ADMIN — HYDRALAZINE HYDROCHLORIDE 10 MG: 20 INJECTION INTRAMUSCULAR; INTRAVENOUS at 08:19

## 2020-07-14 RX ADMIN — LATANOPROST 1 DROP: 50 SOLUTION OPHTHALMIC at 23:14

## 2020-07-14 RX ADMIN — POLYETHYLENE GLYCOL 3350 17 G: 17 POWDER, FOR SOLUTION ORAL at 08:50

## 2020-07-14 RX ADMIN — POTASSIUM CHLORIDE 10 MEQ: 10 INJECTION, SOLUTION INTRAVENOUS at 17:24

## 2020-07-14 RX ADMIN — SODIUM CHLORIDE 150 ML/HR: 450 INJECTION, SOLUTION INTRAVENOUS at 17:28

## 2020-07-14 RX ADMIN — CLONIDINE HYDROCHLORIDE 0.1 MG: 0.1 TABLET ORAL at 12:51

## 2020-07-14 RX ADMIN — INSULIN GLARGINE 7 UNITS: 100 INJECTION, SOLUTION SUBCUTANEOUS at 08:53

## 2020-07-14 RX ADMIN — METOPROLOL TARTRATE 50 MG: 50 TABLET, FILM COATED ORAL at 08:52

## 2020-07-14 RX ADMIN — CLONIDINE HYDROCHLORIDE 0.1 MG: 0.1 TABLET ORAL at 17:24

## 2020-07-14 RX ADMIN — METOPROLOL TARTRATE 50 MG: 50 TABLET, FILM COATED ORAL at 17:24

## 2020-07-14 RX ADMIN — PANTOPRAZOLE SODIUM 40 MG: 40 INJECTION, POWDER, FOR SOLUTION INTRAVENOUS at 02:00

## 2020-07-14 RX ADMIN — POTASSIUM CHLORIDE 10 MEQ: 10 INJECTION, SOLUTION INTRAVENOUS at 12:51

## 2020-07-14 RX ADMIN — INSULIN GLARGINE 3 UNITS: 100 INJECTION, SOLUTION SUBCUTANEOUS at 12:51

## 2020-07-14 RX ADMIN — BISACODYL 10 MG: 10 SUPPOSITORY RECTAL at 12:51

## 2020-07-14 RX ADMIN — SODIUM CHLORIDE 100 ML/HR: 450 INJECTION, SOLUTION INTRAVENOUS at 05:06

## 2020-07-14 RX ADMIN — CEFTRIAXONE 1 G: 1 INJECTION, POWDER, FOR SOLUTION INTRAMUSCULAR; INTRAVENOUS at 14:23

## 2020-07-14 RX ADMIN — AZITHROMYCIN MONOHYDRATE 250 MG: 500 INJECTION, POWDER, LYOPHILIZED, FOR SOLUTION INTRAVENOUS at 05:27

## 2020-07-14 RX ADMIN — POTASSIUM CHLORIDE 10 MEQ: 10 INJECTION, SOLUTION INTRAVENOUS at 15:46

## 2020-07-14 RX ADMIN — POTASSIUM CHLORIDE 10 MEQ: 10 INJECTION, SOLUTION INTRAVENOUS at 14:23

## 2020-07-14 RX ADMIN — AMLODIPINE BESYLATE 10 MG: 10 TABLET ORAL at 08:52

## 2020-07-14 RX ADMIN — INSULIN LISPRO 4 UNITS: 100 INJECTION, SOLUTION INTRAVENOUS; SUBCUTANEOUS at 08:53

## 2020-07-14 RX ADMIN — HYDRALAZINE HYDROCHLORIDE 10 MG: 20 INJECTION INTRAMUSCULAR; INTRAVENOUS at 16:30

## 2020-07-14 RX ADMIN — TAMSULOSIN HYDROCHLORIDE 0.4 MG: 0.4 CAPSULE ORAL at 08:52

## 2020-07-14 NOTE — PROGRESS NOTES
Internal Medicine Progress Note        NAME: Carlo Mercer   :  1955  MRM:  [id number]    Date/Time: 2020        ASSESSMENT/PLAN:    Principal Problem:    Stage 3 acute kidney injury ([facility] [address] (2020)    Active Problems:    Essential hypertension (2013)      Type II diabetes mellitus ([facility] [address] (2013)      Benign hypertension ()      Hyponatremia (2020)      Hyperglycemia (2020)      Community acquired pneumonia of right middle lobe of lung (2020)      Former smoker (2020)      BPH (benign prostatic hyperplasia) (2020)      # RML pneumonia. Follow CXR. Maintain O2.     - culture of blood NTD  -spiking of temp. Repeat culture if spike 101 or more     # MEGHAN. Lower abd pains. Constipation . Poor appetite and eating. Possible ileus :    KUB  Findings suggestive of small bowel obstruction initially, serial KUB. NPO IVF. Passing BM. Mobilization. Maintain K/lytes level      No Vomiting, hold NGT for now. # PRADEEP/CKD3 . Improving  with hydration. Continue to improve. Continue hydration. Monitor I/O    Monitor Renal function and other labs as indicated. Avoid nephrotoxins , iv Contrast, NSAID. Renally dosing medications. Monitor urine out put. # DM. Provide SSI, hypoglycemia protocol and frequent Accu checks. Provide SSI, hypoglycemia protocol and frequent Accu checks. Education,  diabetic educator  . Diabetic Diet     # TCP. Observe    # Nausea not controlled by zofran. Added phenergan     # Mild hypernatremia. Changed ivf     # Hypokalemia.  Replete and trend     # PT        - Cont acceptable home medications for chronic conditions   - DVT protocol    IP CONSULT TO HOSPITALIST    Lab Review:     Recent Labs     20  0350   WBC 8.3   HGB 12.2*   HCT 35.1*   *     Recent Labs     20  0355 20  0355 20  0350   * 148* 142   K 3.0* 3.0* 2.4*   * 113* 107   CO2 31 30 27   * 139* 148*   BUN 39* 67* 104*   CREA 1.30 1.84* 2.81*   CA 8.8 8.2* 7.8*   MG  --   --  2.6   ALB  --   --  2.1*   TBILI  --   --  3.0*   ALT  --   --  53     Lab Results   Component Value Date/Time    Glucose (POC) 208 (H) 07/14/2020 07:14 AM    Glucose (POC) 139 (H) 07/13/2020 09:22 PM    Glucose (POC) 213 (H) 07/13/2020 04:16 PM    Glucose (POC) 189 (H) 07/13/2020 11:55 AM    Glucose (POC) 149 (H) 07/13/2020 07:30 AM     No results for input(s): PH, PCO2, PO2, HCO3, FIO2 in the last 72 hours. No results for input(s): INR, INREXT, INREXT in the last 72 hours. No results found for: SDES  Lab Results   Component Value Date/Time    Culture result: NO GROWTH 3 DAYS 07/11/2020 04:04 PM    Culture result: NO GROWTH 3 DAYS 07/11/2020 04:04 PM      All Cardiac Markers in the last 24 hours: No results found for: CPK, CK, CKMMB, CKMB, RCK3, CKMBT, CKNDX, CKND1, TRE, TROPT, TROIQ, YOUSUF, TROPT, TNIPOC, BNP, BNPP      Intervals noted   Pt s/e @ bedside     Subjective:     Chief Complaint:      Passed BM  Still nausea. Less abd discomfort   Nausea better    ROS:  (bold if positive,otherwise negative)    Fever/chills ,  Dysuria   Cough , Sputum , SOB/HUNT , Chest Pain     Diarrhea ,Nausea/Vomit , Abd Pain , Constipation            Objective:     Vitals:  Last 24hrs VS reviewed since prior progress note.  Most recent are:    Visit Vitals  /75   Pulse (!) 107   Temp (!) 100.5 °F (38.1 °C)   Resp 20   Ht 5' 10\" (1.778 m)   Wt 86.4 kg (190 lb 7.6 oz)   SpO2 100%   BMI 27.33 kg/m²     SpO2 Readings from Last 6 Encounters:   07/14/20 100%   06/30/20 98%   12/22/19 97%   07/16/19 96%   05/13/19 93%   04/16/19 98%            Intake/Output Summary (Last 24 hours) at 7/14/2020 1155  Last data filed at 7/14/2020 3720  Gross per 24 hour   Intake 0 ml   Output 1400 ml   Net -1400 ml          Physical Exam:   General Appearance: NAD, conversant  Lungs: CTA with normal respiratory effort  CV: RRR, no m/r/g  Abdomen: soft, less tenderness lower area , less distended , doughy feeling  , active bowel sounds  Extremities: no cyanosis, no peripheral edema  Neuro: No focal deficits, motor/sensory intact.      Medications Reviewed: (see below)    Lab Data Reviewed: (see below)    ______________________________________________________________________    Medications:     Current Facility-Administered Medications   Medication Dose Route Frequency    [START ON 7/15/2020] insulin glargine (LANTUS) injection 10 Units  10 Units SubCUTAneous DAILY    insulin glargine (LANTUS) injection 3 Units  3 Units SubCUTAneous ONCE    cloNIDine HCL (CATAPRES) tablet 0.1 mg  0.1 mg Oral BID    [START ON 7/15/2020] NIFEdipine ER (PROCARDIA XL) tablet 90 mg  90 mg Oral DAILY    polyethylene glycol (MIRALAX) packet 17 g  17 g Oral DAILY    promethazine (PHENERGAN) injection 25 mg  25 mg IntraMUSCular Q6H PRN    0.45% sodium chloride infusion  100 mL/hr IntraVENous CONTINUOUS    hydrALAZINE (APRESOLINE) 20 mg/mL injection 10 mg  10 mg IntraVENous Q6H PRN    ondansetron (ZOFRAN) injection 4 mg  4 mg IntraVENous Q4H PRN    tamsulosin (FLOMAX) capsule 0.4 mg  0.4 mg Oral DAILY    latanoprost (XALATAN) 0.005 % ophthalmic solution 1 Drop  1 Drop Left Eye QHS    metoprolol tartrate (LOPRESSOR) tablet 50 mg  50 mg Oral BID    amLODIPine (NORVASC) tablet 10 mg  10 mg Oral DAILY    insulin lispro (HUMALOG) injection   SubCUTAneous AC&HS    glucose chewable tablet 16 g  4 Tab Oral PRN    glucagon (GLUCAGEN) injection 1 mg  1 mg IntraMUSCular PRN    dextrose 10% infusion 125-250 mL  125-250 mL IntraVENous PRN    docusate sodium (COLACE) capsule 100 mg  100 mg Oral BID PRN    melatonin tablet 12 mg  12 mg Oral QHS PRN    albuterol-ipratropium (DUO-NEB) 2.5 MG-0.5 MG/3 ML  3 mL Nebulization Q4H PRN    pantoprazole (PROTONIX) injection 40 mg  40 mg IntraVENous DAILY PRN    acetaminophen (TYLENOL) tablet 650 mg  650 mg Oral Q6H PRN    cefTRIAXone (ROCEPHIN) 1 g in 0.9% sodium chloride (MBP/ADV) 50 mL MBP  1 g IntraVENous Q24H  azithromycin (ZITHROMAX) 250 mg in 0.9% sodium chloride 250 mL IVPB  250 mg IntraVENous Q24H          Total time spent with patient: 35 minutes                  Care Plan discussed with: Patient, Care Manager, Nursing Staff and >50% of time spent in counseling and coordination of care    Discussed:  Care Plan    Prophylaxis:  SCD's    Disposition:  Home w/Family           This document in whole or part of it has been produced using voice recognition software. Unrecognized errors in transcription may be present.     Attending Physician: Marin Manjarrez MD

## 2020-07-14 NOTE — PROGRESS NOTES
Received patient. Patient sleeping. 0820 BP high, will treat with hydralazine. 0850 BP recheck, still has not gone down. Will address with provider. [facility] with Dr. Cosme Kilpatrick, states recheck. 3902 CNA reports that patient is vomiting. Will report to provider. BP now 156/101.

## 2020-07-14 NOTE — PROGRESS NOTES
Bedside shift change report given to Edgar Boyd RN (oncoming nurse) by Maritza Perez RN (offgoing nurse). Report included the following information SBAR, Kardex, Intake/Output and MAR.

## 2020-07-14 NOTE — PROGRESS NOTES
Problem: Diabetes Self-Management  Goal: *Disease process and treatment process  Description: Define diabetes and identify own type of diabetes; list 3 options for treating diabetes. Outcome: Progressing Towards Goal  Goal: *Incorporating nutritional management into lifestyle  Description: Describe effect of type, amount and timing of food on blood glucose; list 3 methods for planning meals. Outcome: Progressing Towards Goal  Goal: *Incorporating physical activity into lifestyle  Description: State effect of exercise on blood glucose levels. Outcome: Progressing Towards Goal  Goal: *Developing strategies to promote health/change behavior  Description: Define the ABC's of diabetes; identify appropriate screenings, schedule and personal plan for screenings. Outcome: Progressing Towards Goal  Goal: *Using medications safely  Description: State effect of diabetes medications on diabetes; name diabetes medication taking, action and side effects. Outcome: Progressing Towards Goal  Goal: *Monitoring blood glucose, interpreting and using results  Description: Identify recommended blood glucose targets  and personal targets. Outcome: Progressing Towards Goal  Goal: *Prevention, detection, treatment of acute complications  Description: List symptoms of hyper- and hypoglycemia; describe how to treat low blood sugar and actions for lowering  high blood glucose level. Outcome: Progressing Towards Goal  Goal: *Prevention, detection and treatment of chronic complications  Description: Define the natural course of diabetes and describe the relationship of blood glucose levels to long term complications of diabetes.   Outcome: Progressing Towards Goal  Goal: *Developing strategies to address psychosocial issues  Description: Describe feelings about living with diabetes; identify support needed and support network  Outcome: Progressing Towards Goal  Goal: *Insulin pump training  Outcome: Progressing Towards Goal  Goal: *Sick day guidelines  Outcome: Progressing Towards Goal  Goal: *Patient Specific Goal (EDIT GOAL, INSERT TEXT)  Outcome: Progressing Towards Goal     Problem: Patient Education: Go to Patient Education Activity  Goal: Patient/Family Education  Outcome: Progressing Towards Goal     Problem: Falls - Risk of  Goal: *Absence of Falls  Description: Document Rawmarianne Stade Fall Risk and appropriate interventions in the flowsheet. Outcome: Progressing Towards Goal  Note: Fall Risk Interventions:  Mobility Interventions: Patient to call before getting OOB         Medication Interventions: Patient to call before getting OOB    Elimination Interventions: Call light in reach              Problem: Patient Education: Go to Patient Education Activity  Goal: Patient/Family Education  Outcome: Progressing Towards Goal     Problem: Discharge Planning  Goal: *Discharge to safe environment  Outcome: Progressing Towards Goal     Problem: Pressure Injury - Risk of  Goal: *Prevention of pressure injury  Description: Document Rai Scale and appropriate interventions in the flowsheet.   Outcome: Progressing Towards Goal  Note: Pressure Injury Interventions:       Moisture Interventions: Apply protective barrier, creams and emollients    Activity Interventions: Increase time out of bed    Mobility Interventions: HOB 30 degrees or less    Nutrition Interventions: Document food/fluid/supplement intake                     Problem: Patient Education: Go to Patient Education Activity  Goal: Patient/Family Education  Outcome: Progressing Towards Goal     Problem: Nutrition Deficit  Goal: *Optimize nutritional status  Outcome: Progressing Towards Goal

## 2020-07-14 NOTE — PROGRESS NOTES
Problem: Diabetes Self-Management  Goal: *Disease process and treatment process  Description: Define diabetes and identify own type of diabetes; list 3 options for treating diabetes. 7/14/2020 1550 by Rose George RN  Outcome: Progressing Towards Goal  7/14/2020 1550 by Rose George RN  Outcome: Progressing Towards Goal  Goal: *Incorporating nutritional management into lifestyle  Description: Describe effect of type, amount and timing of food on blood glucose; list 3 methods for planning meals. 7/14/2020 1550 by Rose George RN  Outcome: Progressing Towards Goal  7/14/2020 1550 by Rose George RN  Outcome: Progressing Towards Goal  Goal: *Incorporating physical activity into lifestyle  Description: State effect of exercise on blood glucose levels. 7/14/2020 1550 by Rose George RN  Outcome: Progressing Towards Goal  7/14/2020 1550 by Rose George RN  Outcome: Progressing Towards Goal  Goal: *Developing strategies to promote health/change behavior  Description: Define the ABC's of diabetes; identify appropriate screenings, schedule and personal plan for screenings. 7/14/2020 1550 by Rose George RN  Outcome: Progressing Towards Goal  7/14/2020 1550 by Rose George RN  Outcome: Progressing Towards Goal  Goal: *Using medications safely  Description: State effect of diabetes medications on diabetes; name diabetes medication taking, action and side effects. 7/14/2020 1550 by Rose George RN  Outcome: Progressing Towards Goal  7/14/2020 1550 by Rose George RN  Outcome: Progressing Towards Goal  Goal: *Monitoring blood glucose, interpreting and using results  Description: Identify recommended blood glucose targets  and personal targets.   7/14/2020 1550 by Rose George RN  Outcome: Progressing Towards Goal  7/14/2020 1550 by Rose George RN  Outcome: Progressing Towards Goal  Goal: *Prevention, detection, treatment of acute complications  Description: List symptoms of hyper- and hypoglycemia; describe how to treat low blood sugar and actions for lowering  high blood glucose level. 7/14/2020 1550 by Yari Martin RN  Outcome: Progressing Towards Goal  7/14/2020 1550 by Yari Martin RN  Outcome: Progressing Towards Goal  Goal: *Prevention, detection and treatment of chronic complications  Description: Define the natural course of diabetes and describe the relationship of blood glucose levels to long term complications of diabetes.   7/14/2020 1550 by Yari Martin RN  Outcome: Progressing Towards Goal  7/14/2020 1550 by Yari Martin RN  Outcome: Progressing Towards Goal  Goal: *Developing strategies to address psychosocial issues  Description: Describe feelings about living with diabetes; identify support needed and support network  7/14/2020 1550 by Yari Martin RN  Outcome: Progressing Towards Goal  7/14/2020 1550 by Yari Martin RN  Outcome: Progressing Towards Goal  Goal: *Insulin pump training  7/14/2020 1550 by Yari Martin RN  Outcome: Progressing Towards Goal  7/14/2020 1550 by Yari Martin RN  Outcome: Progressing Towards Goal  Goal: *Sick day guidelines  7/14/2020 1550 by Yari Martin RN  Outcome: Progressing Towards Goal  7/14/2020 1550 by Yari Martin RN  Outcome: Progressing Towards Goal  Goal: *Patient Specific Goal (EDIT GOAL, INSERT TEXT)  7/14/2020 1550 by Yari Martin RN  Outcome: Progressing Towards Goal  7/14/2020 1550 by Yari Martin RN  Outcome: Progressing Towards Goal     Problem: Patient Education: Go to Patient Education Activity  Goal: Patient/Family Education  7/14/2020 1550 by Yari Martin RN  Outcome: Progressing Towards Goal  7/14/2020 1550 by Yari Martin RN  Outcome: Progressing Towards Goal     Problem: Falls - Risk of  Goal: *Absence of Falls  Description: Document Samantha Yeh Fall Risk and appropriate interventions in the flowsheet. 7/14/2020 1550 by Gayathri Hill RN  Outcome: Progressing Towards Goal  Note: Fall Risk Interventions:  Mobility Interventions: Patient to call before getting OOB         Medication Interventions: Teach patient to arise slowly    Elimination Interventions: Toileting schedule/hourly rounds           7/14/2020 1550 by Gayathri Hill RN  Outcome: Progressing Towards Goal  Note: Fall Risk Interventions:  Mobility Interventions: Patient to call before getting OOB         Medication Interventions: Teach patient to arise slowly    Elimination Interventions: Toileting schedule/hourly rounds              Problem: Patient Education: Go to Patient Education Activity  Goal: Patient/Family Education  7/14/2020 1550 by Gayathri Hill RN  Outcome: Progressing Towards Goal  7/14/2020 1550 by Gayathri Hill RN  Outcome: Progressing Towards Goal     Problem: Discharge Planning  Goal: *Discharge to safe environment  7/14/2020 1550 by Gayathri Hill RN  Outcome: Progressing Towards Goal  7/14/2020 1550 by Gayathri Hill RN  Outcome: Progressing Towards Goal     Problem: Pressure Injury - Risk of  Goal: *Prevention of pressure injury  Description: Document Rai Scale and appropriate interventions in the flowsheet.   7/14/2020 1550 by Gayathri Hill RN  Outcome: Progressing Towards Goal  Note: Pressure Injury Interventions:       Moisture Interventions: Absorbent underpads    Activity Interventions: Increase time out of bed, PT/OT evaluation    Mobility Interventions: HOB 30 degrees or less    Nutrition Interventions: Document food/fluid/supplement intake                  7/14/2020 1550 by Gayathri Hill RN  Outcome: Progressing Towards Goal  Note: Pressure Injury Interventions:       Moisture Interventions: Absorbent underpads    Activity Interventions: Increase time out of bed, PT/OT evaluation    Mobility Interventions: HOB 30 degrees or less    Nutrition Interventions: Document food/fluid/supplement intake                     Problem: Patient Education: Go to Patient Education Activity  Goal: Patient/Family Education  7/14/2020 1550 by Reema Prescott RN  Outcome: Progressing Towards Goal  7/14/2020 1550 by Reema Prescott RN  Outcome: Progressing Towards Goal     Problem: Nutrition Deficit  Goal: *Optimize nutritional status  7/14/2020 1550 by Reema Prescott RN  Outcome: Progressing Towards Goal  7/14/2020 1550 by Reema Prescott RN  Outcome: Progressing Towards Goal

## 2020-07-14 NOTE — DIABETES MGMT
GLYCEMIC CONTROL:  Suggest 10 units Lantus/day.             Current Insulin regimen:   5 units Lantus/day  Corrective lispro, normal insulin sensitivity ACHS  Home medication/insulin regimen:   Metformin 500 mg 2 tablets BID  Januvia 100 mg daily  HbA1c: 7.4% - ave BG ~ 166 mg/dL over past 3 months  Diet:  NPO (receiving IVF of 1/2 NS at 150 ml/hr)    David Zarate RD, CDE   Office:  [address] [place] [address] Pager:  [phone]

## 2020-07-14 NOTE — PROGRESS NOTES
1930  Bedside, Verbal, and Written shift change report given to [address] [place] [address] (oncoming nurse) by Domenic Cueva (offgoing nurse). Report included the following information SBAR, Kardex, and MAR. Patient is in bed, alert and oriented. Room air, IV CDI, , dressing CDI, no sign of distress. Patient did ask for ice pack for his abdomen. Bed low, call bell within reach. 2200  Patient is in bed, complaining of nausea. Patient had about 100 ml of clear vomit. Patient was given prn zofran. Then patient tried to eat the fruit at bedside, advised to wait until he actually feels better before trying anything other than water. 0000  Patient complained once again of nausea but not vomit this time, advised that zofran is not due until 2am. Patient requested ginerale instead. 0200  Patient stated that he feels sick to his stomach, offered zofran patient declined stated that it will not work. He wanted something for gas pain. Patient's abdomen is hard, he said he has not passed gas or belched in a while. He also denied having a BM recently. Encouraged to walk around to move the gas in his abdomen, patient declined to get out of bed. Instead he asked for protonix. 0300  Patient is in bed, resting/ sleeping. No sign of distress. Bed low, call bell within reach. 0400  Patient is in bed, alert and oriented. Room air, IV CDI, dressing CDI, no sign of distress. Bed low, call bell within reach. 0730  Bedside, Verbal, and Written shift change report given to elvira MUSA (oncoming nurse) by [address] [place] [address] (offgoing nurse). Report included the following information SBAR, Kardex, and MAR.

## 2020-07-15 ENCOUNTER — APPOINTMENT (OUTPATIENT)
Dept: CT IMAGING | Age: 65
DRG: 229 | End: 2020-07-15
Attending: INTERNAL MEDICINE
Payer: MEDICAID

## 2020-07-15 ENCOUNTER — APPOINTMENT (OUTPATIENT)
Dept: GENERAL RADIOLOGY | Age: 65
DRG: 229 | End: 2020-07-15
Attending: INTERNAL MEDICINE
Payer: MEDICAID

## 2020-07-15 LAB
ALBUMIN SERPL-MCNC: 2 G/DL (ref 3.4–5)
ALBUMIN/GLOB SERPL: 0.5 {RATIO} (ref 0.8–1.7)
ALP SERPL-CCNC: 75 U/L (ref 45–117)
ALT SERPL-CCNC: 44 U/L (ref 16–61)
ANION GAP SERPL CALC-SCNC: 6 MMOL/L (ref 3–18)
AST SERPL-CCNC: 50 U/L (ref 10–38)
BILIRUB DIRECT SERPL-MCNC: 1.6 MG/DL (ref 0–0.2)
BILIRUB SERPL-MCNC: 1.9 MG/DL (ref 0.2–1)
BUN SERPL-MCNC: 28 MG/DL (ref 7–18)
BUN/CREAT SERPL: 27 (ref 12–20)
CALCIUM SERPL-MCNC: 7.8 MG/DL (ref 8.5–10.1)
CHLORIDE SERPL-SCNC: 117 MMOL/L (ref 100–111)
CO2 SERPL-SCNC: 28 MMOL/L (ref 21–32)
CREAT SERPL-MCNC: 1.03 MG/DL (ref 0.6–1.3)
GLOBULIN SER CALC-MCNC: 3.9 G/DL (ref 2–4)
GLUCOSE BLD STRIP.AUTO-MCNC: 100 MG/DL (ref 70–110)
GLUCOSE BLD STRIP.AUTO-MCNC: 123 MG/DL (ref 70–110)
GLUCOSE BLD STRIP.AUTO-MCNC: 124 MG/DL (ref 70–110)
GLUCOSE BLD STRIP.AUTO-MCNC: 87 MG/DL (ref 70–110)
GLUCOSE SERPL-MCNC: 91 MG/DL (ref 74–99)
MAGNESIUM SERPL-MCNC: 1.9 MG/DL (ref 1.6–2.6)
PHOSPHATE SERPL-MCNC: 2.2 MG/DL (ref 2.5–4.9)
POTASSIUM SERPL-SCNC: 3 MMOL/L (ref 3.5–5.5)
PROT SERPL-MCNC: 5.9 G/DL (ref 6.4–8.2)
SODIUM SERPL-SCNC: 151 MMOL/L (ref 136–145)

## 2020-07-15 PROCEDURE — 74018 RADEX ABDOMEN 1 VIEW: CPT

## 2020-07-15 PROCEDURE — 74011000258 HC RX REV CODE- 258: Performed by: INTERNAL MEDICINE

## 2020-07-15 PROCEDURE — 74011250637 HC RX REV CODE- 250/637: Performed by: INTERNAL MEDICINE

## 2020-07-15 PROCEDURE — 74011250636 HC RX REV CODE- 250/636: Performed by: INTERNAL MEDICINE

## 2020-07-15 PROCEDURE — 80048 BASIC METABOLIC PNL TOTAL CA: CPT

## 2020-07-15 PROCEDURE — 36415 COLL VENOUS BLD VENIPUNCTURE: CPT

## 2020-07-15 PROCEDURE — 74177 CT ABD & PELVIS W/CONTRAST: CPT

## 2020-07-15 PROCEDURE — 65270000029 HC RM PRIVATE

## 2020-07-15 PROCEDURE — 80076 HEPATIC FUNCTION PANEL: CPT

## 2020-07-15 PROCEDURE — 83735 ASSAY OF MAGNESIUM: CPT

## 2020-07-15 PROCEDURE — 74011636320 HC RX REV CODE- 636/320: Performed by: INTERNAL MEDICINE

## 2020-07-15 PROCEDURE — C9113 INJ PANTOPRAZOLE SODIUM, VIA: HCPCS | Performed by: INTERNAL MEDICINE

## 2020-07-15 PROCEDURE — 84100 ASSAY OF PHOSPHORUS: CPT

## 2020-07-15 PROCEDURE — 74011636637 HC RX REV CODE- 636/637: Performed by: INTERNAL MEDICINE

## 2020-07-15 PROCEDURE — 82962 GLUCOSE BLOOD TEST: CPT

## 2020-07-15 RX ORDER — PANTOPRAZOLE SODIUM 40 MG/10ML
40 INJECTION, POWDER, LYOPHILIZED, FOR SOLUTION INTRAVENOUS EVERY 24 HOURS
Status: DISCONTINUED | OUTPATIENT
Start: 2020-07-15 | End: 2020-07-21 | Stop reason: HOSPADM

## 2020-07-15 RX ORDER — DEXTROSE MONOHYDRATE 50 MG/ML
100 INJECTION, SOLUTION INTRAVENOUS CONTINUOUS
Status: DISCONTINUED | OUTPATIENT
Start: 2020-07-15 | End: 2020-07-15

## 2020-07-15 RX ORDER — INSULIN GLARGINE 100 [IU]/ML
8 INJECTION, SOLUTION SUBCUTANEOUS DAILY
Status: DISCONTINUED | OUTPATIENT
Start: 2020-07-16 | End: 2020-07-21 | Stop reason: HOSPADM

## 2020-07-15 RX ORDER — POTASSIUM CHLORIDE 7.45 MG/ML
10 INJECTION INTRAVENOUS
Status: COMPLETED | OUTPATIENT
Start: 2020-07-15 | End: 2020-07-15

## 2020-07-15 RX ORDER — DEXTROSE AND POTASSIUM CHLORIDE 5; .15 G/100ML; G/100ML
SOLUTION INTRAVENOUS CONTINUOUS
Status: DISCONTINUED | OUTPATIENT
Start: 2020-07-15 | End: 2020-07-21 | Stop reason: HOSPADM

## 2020-07-15 RX ADMIN — LATANOPROST 1 DROP: 50 SOLUTION OPHTHALMIC at 22:28

## 2020-07-15 RX ADMIN — METOPROLOL TARTRATE 50 MG: 50 TABLET, FILM COATED ORAL at 18:18

## 2020-07-15 RX ADMIN — CLONIDINE HYDROCHLORIDE 0.1 MG: 0.1 TABLET ORAL at 18:18

## 2020-07-15 RX ADMIN — AZITHROMYCIN MONOHYDRATE 250 MG: 500 INJECTION, POWDER, LYOPHILIZED, FOR SOLUTION INTRAVENOUS at 02:45

## 2020-07-15 RX ADMIN — DEXTROSE MONOHYDRATE AND POTASSIUM CHLORIDE: 5; .149 INJECTION, SOLUTION INTRAVENOUS at 13:06

## 2020-07-15 RX ADMIN — INSULIN GLARGINE 10 UNITS: 100 INJECTION, SOLUTION SUBCUTANEOUS at 08:33

## 2020-07-15 RX ADMIN — POTASSIUM CHLORIDE 10 MEQ: 10 INJECTION, SOLUTION INTRAVENOUS at 14:12

## 2020-07-15 RX ADMIN — IOPAMIDOL 98 ML: 612 INJECTION, SOLUTION INTRAVENOUS at 15:38

## 2020-07-15 RX ADMIN — AMLODIPINE BESYLATE 10 MG: 10 TABLET ORAL at 08:33

## 2020-07-15 RX ADMIN — TAMSULOSIN HYDROCHLORIDE 0.4 MG: 0.4 CAPSULE ORAL at 08:33

## 2020-07-15 RX ADMIN — CLONIDINE HYDROCHLORIDE 0.1 MG: 0.1 TABLET ORAL at 08:33

## 2020-07-15 RX ADMIN — METOPROLOL TARTRATE 50 MG: 50 TABLET, FILM COATED ORAL at 08:33

## 2020-07-15 RX ADMIN — PANTOPRAZOLE SODIUM 40 MG: 40 INJECTION, POWDER, FOR SOLUTION INTRAVENOUS at 13:05

## 2020-07-15 RX ADMIN — CEFTRIAXONE 1 G: 1 INJECTION, POWDER, FOR SOLUTION INTRAMUSCULAR; INTRAVENOUS at 16:00

## 2020-07-15 RX ADMIN — SODIUM CHLORIDE 150 ML/HR: 450 INJECTION, SOLUTION INTRAVENOUS at 00:33

## 2020-07-15 RX ADMIN — POTASSIUM CHLORIDE 10 MEQ: 10 INJECTION, SOLUTION INTRAVENOUS at 13:05

## 2020-07-15 RX ADMIN — ACETAMINOPHEN 650 MG: 325 TABLET, FILM COATED ORAL at 21:07

## 2020-07-15 RX ADMIN — IOHEXOL 50 ML: 240 INJECTION, SOLUTION INTRATHECAL; INTRAVASCULAR; INTRAVENOUS; ORAL at 13:53

## 2020-07-15 RX ADMIN — NIFEDIPINE 90 MG: 30 TABLET, FILM COATED, EXTENDED RELEASE ORAL at 08:33

## 2020-07-15 RX ADMIN — DEXTROSE MONOHYDRATE 100 ML/HR: 5 INJECTION, SOLUTION INTRAVENOUS at 10:37

## 2020-07-15 NOTE — PROGRESS NOTES
Discharge/Transition Planning  Problem: Discharge Planning  Goal: *Discharge to safe environment  Outcome: Progressing Towards Goal     PLAN : Home     Care Management following and chart reviewed.  Plan is Home and will need Medicaid transport called for tung Gaytan RN BSN  Outcomes Manager  Office # [phone]  Pager # [phone]

## 2020-07-15 NOTE — PROGRESS NOTES
Internal Medicine Progress Note        NAME: Paulino Tellez   :  1955  MRM:  [id number]    Date/Time: 7/15/2020        ASSESSMENT/PLAN:    Principal Problem:    Stage 3 acute kidney injury ([facility] [address] (2020)    Active Problems:    Essential hypertension (2013)      Type II diabetes mellitus ([facility] [address] (2013)      Benign hypertension ()      Hyponatremia (2020)      Hyperglycemia (2020)      Community acquired pneumonia of right middle lobe of lung (2020)      Former smoker (2020)      BPH (benign prostatic hyperplasia) (2020)        # SBO. Improved slightly  . Dw radiologist, will repeat CT abd/pelvic. Patient currently asymptomatic. Keep npo except meds. KUB  Findings suggestive of small bowel obstruction initially, serial KUB done. IVF. Passing BM. Mobilization. Maintain K/lytes level      No Vomiting, hold NGT for now. # RML pneumonia. Follow CXR. Maintain O2.     - culture of blood NTD  - no further spiking of temp. # Hypokalemia. Replete and trend     # stage 3 PRADEEP/CKD3 . Improving  with hydration. Continue to improve. Continue hydration. Monitor I/O    Monitor Renal function and other labs as indicated. Avoid nephrotoxins , iv Contrast, NSAID. Renally dosing medications. Monitor urine out put. # DM. Provide SSI, hypoglycemia protocol and frequent Accu checks. Provide SSI, hypoglycemia protocol and frequent Accu checks. Education,  diabetic educator  . Diabetic Diet     # TCP. Observe    # Nausea not controlled by zofran. Added phenergan     # Mild hypernatremia. Changed ivf to D5 Kcl. # PT        - Cont acceptable home medications for chronic conditions   - DVT protocol    IP CONSULT TO HOSPITALIST    Lab Review:     No results for input(s): WBC, HGB, HCT, PLT, HGBEXT, HCTEXT, PLTEXT, HGBEXT, HCTEXT, PLTEXT in the last 72 hours.   Recent Labs     07/15/20  0315 20  0355 20  0355   * 147* 148*   K 3.0* 3.0* 3.0*   * 112* 113*   CO2 28 31 30   GLU 91 179* 139*   BUN 28* 39* 67*   CREA 1.03 1.30 1.84*   CA 7.8* 8.8 8.2*   MG 1.9  --   --    PHOS 2.2*  --   --    ALB 2.0*  --   --    TBILI 1.9*  --   --    ALT 44  --   --      Lab Results   Component Value Date/Time    Glucose (POC) 87 07/15/2020 11:28 AM    Glucose (POC) 100 07/15/2020 07:25 AM    Glucose (POC) 111 (H) 07/14/2020 09:34 PM    Glucose (POC) 131 (H) 07/14/2020 03:57 PM    Glucose (POC) 116 (H) 07/14/2020 11:56 AM     No results for input(s): PH, PCO2, PO2, HCO3, FIO2 in the last 72 hours. No results for input(s): INR, INREXT, INREXT in the last 72 hours. No results found for: SDES  Lab Results   Component Value Date/Time    Culture result: NO GROWTH 4 DAYS 07/11/2020 04:04 PM    Culture result: NO GROWTH 4 DAYS 07/11/2020 04:04 PM      All Cardiac Markers in the last 24 hours: No results found for: CPK, CK, CKMMB, CKMB, RCK3, CKMBT, CKNDX, CKND1, TRE, TROPT, TROIQ, YOUSUF, TROPT, TNIPOC, BNP, BNPP      Intervals noted   Pt s/e @ bedside     Subjective:     Chief Complaint:      Passed BM x2. Lonita Bone to go home and eat  No NV. No pains    ROS:  (bold if positive,otherwise negative). Fever/chills ,  Dysuria   Cough , Sputum , SOB/HUNT , Chest Pain     Diarrhea ,Nausea/Vomit , Abd Pain , Constipation            Objective:     Vitals:  Last 24hrs VS reviewed since prior progress note.  Most recent are:    Visit Vitals  /74   Pulse 68   Temp 98.9 °F (37.2 °C)   Resp 20   Ht 5' 10\" (1.778 m)   Wt 86.4 kg (190 lb 7.6 oz)   SpO2 99%   BMI 27.33 kg/m²     SpO2 Readings from Last 6 Encounters:   07/15/20 99%   06/30/20 98%   12/22/19 97%   07/16/19 96%   05/13/19 93%   04/16/19 98%            Intake/Output Summary (Last 24 hours) at 7/15/2020 1255  Last data filed at 7/15/2020 0659  Gross per 24 hour   Intake 1800 ml   Output 375 ml   Net 1425 ml          Physical Exam:   General Appearance: NAD, conversant  Lungs: CTA with normal respiratory effort  CV: RRR, no m/r/g  Abdomen: soft, less tenderness lower area , less distended , doughy feeling  , active bowel sounds  Extremities: no cyanosis, no peripheral edema  Neuro: No focal deficits, motor/sensory intact.      Medications Reviewed: (see below)    Lab Data Reviewed: (see below)    ______________________________________________________________________    Medications:     Current Facility-Administered Medications   Medication Dose Route Frequency    [START ON 7/16/2020] insulin glargine (LANTUS) injection 8 Units  8 Units SubCUTAneous DAILY    dextrose 5% with KCl 20 mEq/L infusion   IntraVENous CONTINUOUS    potassium chloride 10 mEq in 100 ml IVPB  10 mEq IntraVENous Q1H    pantoprazole (PROTONIX) injection 40 mg  40 mg IntraVENous Q24H    cloNIDine HCL (CATAPRES) tablet 0.1 mg  0.1 mg Oral BID    NIFEdipine ER (PROCARDIA XL) tablet 90 mg  90 mg Oral DAILY    polyethylene glycol (MIRALAX) packet 17 g  17 g Oral DAILY    promethazine (PHENERGAN) injection 25 mg  25 mg IntraMUSCular Q6H PRN    hydrALAZINE (APRESOLINE) 20 mg/mL injection 10 mg  10 mg IntraVENous Q6H PRN    ondansetron (ZOFRAN) injection 4 mg  4 mg IntraVENous Q4H PRN    tamsulosin (FLOMAX) capsule 0.4 mg  0.4 mg Oral DAILY    latanoprost (XALATAN) 0.005 % ophthalmic solution 1 Drop  1 Drop Left Eye QHS    metoprolol tartrate (LOPRESSOR) tablet 50 mg  50 mg Oral BID    amLODIPine (NORVASC) tablet 10 mg  10 mg Oral DAILY    insulin lispro (HUMALOG) injection   SubCUTAneous AC&HS    glucose chewable tablet 16 g  4 Tab Oral PRN    glucagon (GLUCAGEN) injection 1 mg  1 mg IntraMUSCular PRN    dextrose 10% infusion 125-250 mL  125-250 mL IntraVENous PRN    docusate sodium (COLACE) capsule 100 mg  100 mg Oral BID PRN    melatonin tablet 12 mg  12 mg Oral QHS PRN    albuterol-ipratropium (DUO-NEB) 2.5 MG-0.5 MG/3 ML  3 mL Nebulization Q4H PRN    acetaminophen (TYLENOL) tablet 650 mg  650 mg Oral Q6H PRN    cefTRIAXone (ROCEPHIN) 1 g in 0.9% sodium chloride (MBP/ADV) 50 mL MBP  1 g IntraVENous Q24H      Total time spent with patient: 35 minutes. Care Plan discussed with: Patient, Care Manager, Nursing Staff and >50% of time spent in counseling and coordination of care DW radiologist    Discussed:  Care Plan    Prophylaxis:  SCD's    Disposition:  Home w/Family           This document in whole or part of it has been produced using voice recognition software. Unrecognized errors in transcription may be present.     Attending Physician: Kaylin Lyon MD

## 2020-07-15 NOTE — PROGRESS NOTES
Received patient from Lashonda Tolentino, [address] [facility]. Patient sitting up in bed, asking if he still cannot have anything to drink. 0161 Morning medication given, patient asking again when physician will be in. Anxious to eat and drink. 1230 Called to check on Ashley Plane in reading room stated it's still on the list to be read, but she will ask radiologist to take a look. 1355 Patient given contrast to drink now.      1415 Patient done drinking contrast.

## 2020-07-15 NOTE — PROGRESS NOTES
Bedside shift change report given to Sister RN (oncoming nurse) by Maritza Perez RN (offgoing nurse). Report included the following information SBAR, Kardex, Intake/Output and MAR.

## 2020-07-15 NOTE — CONSULTS
General Surgery Consult      Darcy Alberts  Admit date: 7/10/2020    MRN: [id number]     : 1955     Age: 75 y.o. Attending Physician: Wing Kayden MD, FACS      Subjective:     Darcy Alberts is a 75 y.o. male who has been admitted for 6 days in the hospital for abdominal pain with nausea and vomiting as well as a picture of pneumonia. When the patient presented to the emergency room on Friday he had nausea and vomiting for about 3 to 4 days before presentation. He has also been complaining of shortness of breath with weakness and cough for couple weeks. He also had acute renal injury from possibly dehydration and the patient was admitted to the medicine service for IV fluids and observation. In the emergency room on Friday he had a CT scan of abdomen and pelvis that showed mildly distended upper abdominal bowel loops without jhonatan transition point. This finding is nonspecific and may represent enteritis, early bowel obstruction, or partial ileus. Right middle and lower lobe pneumonic infiltrates. Over the course of the hospital the patient has been doing relatively well and we recently the last 2 days has been passing gas and having normal bowel movement. I did get consulted today because it seems his KUB has been showing a picture of persistent small bowel obstruction. When I spoke with the patient today it is the first time that I see him and he explained to me that he is feeling great and he would like actually to go home. He denies any nausea or vomiting and he denies any abdominal pain and he stated that he is passing flatus and having normal bowel movement. He had fever yesterday to 38.1 at 11 in the morning but no fever afterwards and his heart rate is normal.  He had hypertension but currently his blood pressure is better. His last white count was 3 days ago on the  and it showed a WBC of 8000. His creatinine today is normal at 1. He has low potassium of 3.    Because of his KUB finding of persistent small bowel obstruction he has been ordered a CT scan of abdomen and pelvis for today but it is not done yet. The patient had a open right inguinal hernia repair in the past but no other abdominal surgeries.     Patient Active Problem List    Diagnosis Date Noted    Hyponatremia 2020    Hyperglycemia 2020    Stage 3 acute kidney injury ([address] 2020    Community acquired pneumonia of right middle lobe of lung 2020    Former smoker 2020    BPH (benign prostatic hyperplasia) 2020    Frequency of micturition     Incomplete bladder emptying     Urgency of micturition     Hypogonadism in male 2017    Nocturia 2017    Urge incontinence 2017    Effusion of knee 2016    Erectile dysfunction 2016    Enlarged heart     Coronary artery disease     Musculoskeletal pain     Wrist joint pain     Arthritis     Benign hypertension     Impotence     Malignant hypertension 2013    Chest pain 2013    Syncope 2013    Essential hypertension 2013    Type II diabetes mellitus ([address] 2013     Past Medical History:   Diagnosis Date    Arthritis     Benign hypertension     Coronary artery disease     Diabetes mellitus ([address]     Enlarged heart     Erectile dysfunction     Frequency of micturition     Hypogonadism in male     Impotence     Incomplete bladder emptying     Musculoskeletal pain     Nocturia     Urgency of micturition     Wrist joint pain       Past Surgical History:   Procedure Laterality Date    HX CARPAL TUNNEL RELEASE      HX HERNIA REPAIR        Social History     Tobacco Use    Smoking status: Former Smoker     Packs/day: 0.50     Years: 5.00     Pack years: 2.50     Types: Cigarettes     Last attempt to quit: 1976     Years since quittin.5    Smokeless tobacco: Never Used   Substance Use Topics    Alcohol use: Yes     Comment: occassionally      Social History Tobacco Use   Smoking Status Former Smoker    Packs/day: 0.50    Years: 5.00    Pack years: 2.50    Types: Cigarettes    Last attempt to quit: 1976    Years since quittin.5   Smokeless Tobacco Never Used     Family History   Problem Relation Age of Onset    Diabetes Mother     Hypertension Mother     Heart Failure Mother     High Cholesterol Mother     Stroke Mother     Other Mother         vision problems    Diabetes Father     Hypertension Father     Diabetes Sister     HIV/AIDS Sister     Hypertension Sister     HIV/AIDS Sister     No Known Problems Brother     HIV/AIDS Sister     Diabetes Maternal Uncle     Hypertension Maternal Uncle       Current Facility-Administered Medications   Medication Dose Route Frequency    [START ON 2020] insulin glargine (LANTUS) injection 8 Units  8 Units SubCUTAneous DAILY    dextrose 5% with KCl 20 mEq/L infusion   IntraVENous CONTINUOUS    potassium chloride 10 mEq in 100 ml IVPB  10 mEq IntraVENous Q1H    pantoprazole (PROTONIX) injection 40 mg  40 mg IntraVENous Q24H    iohexoL (OMNIPAQUE) 240 mg iodine/mL solution 50 mL  50 mL Oral RAD ONCE    cloNIDine HCL (CATAPRES) tablet 0.1 mg  0.1 mg Oral BID    NIFEdipine ER (PROCARDIA XL) tablet 90 mg  90 mg Oral DAILY    polyethylene glycol (MIRALAX) packet 17 g  17 g Oral DAILY    promethazine (PHENERGAN) injection 25 mg  25 mg IntraMUSCular Q6H PRN    hydrALAZINE (APRESOLINE) 20 mg/mL injection 10 mg  10 mg IntraVENous Q6H PRN    ondansetron (ZOFRAN) injection 4 mg  4 mg IntraVENous Q4H PRN    tamsulosin (FLOMAX) capsule 0.4 mg  0.4 mg Oral DAILY    latanoprost (XALATAN) 0.005 % ophthalmic solution 1 Drop  1 Drop Left Eye QHS    metoprolol tartrate (LOPRESSOR) tablet 50 mg  50 mg Oral BID    amLODIPine (NORVASC) tablet 10 mg  10 mg Oral DAILY    insulin lispro (HUMALOG) injection   SubCUTAneous AC&HS    glucose chewable tablet 16 g  4 Tab Oral PRN    glucagon (GLUCAGEN) injection 1 mg  1 mg IntraMUSCular PRN    dextrose 10% infusion 125-250 mL  125-250 mL IntraVENous PRN    docusate sodium (COLACE) capsule 100 mg  100 mg Oral BID PRN    melatonin tablet 12 mg  12 mg Oral QHS PRN    albuterol-ipratropium (DUO-NEB) 2.5 MG-0.5 MG/3 ML  3 mL Nebulization Q4H PRN    acetaminophen (TYLENOL) tablet 650 mg  650 mg Oral Q6H PRN    cefTRIAXone (ROCEPHIN) 1 g in 0.9% sodium chloride (MBP/ADV) 50 mL MBP  1 g IntraVENous Q24H      Allergies   Allergen Reactions    Aspirin Anaphylaxis     Fatal    Lisinopril Cough        Review of Systems:  Constitutional: negative  Eyes: negative  Ears, Nose, Mouth, Throat, and Face: negative  Respiratory: positive for cough or dyspnea on exertion  Cardiovascular: negative  Gastrointestinal: positive for nausea, vomiting and abdominal pain  Genitourinary:negative  Integument/Breast: negative  Hematologic/Lymphatic: negative  Musculoskeletal:negative  Neurological: negative  Behavioral/Psychiatric: negative  Endocrine: negative  Allergic/Immunologic: negative      Objective:     Visit Vitals  /74   Pulse 68   Temp 98.9 °F (37.2 °C)   Resp 20   Ht 5' 10\" (1.778 m)   Wt 86.4 kg (190 lb 7.6 oz)   SpO2 99%   BMI 27.33 kg/m²       Physical Exam:      General:  in no apparent distress, alert, oriented times 3, afebrile, normal vitals and cooperative   Eyes:  conjunctivae and sclerae normal, pupils equal, round, reactive to light   Throat & Neck: no erythema or exudates noted and neck supple and symmetrical; no palpable masses   Lungs:   clear to auscultation bilaterally   Heart:  Regular rate and rhythm   Abdomen:   rounded, soft, nontender, nondistended, no masses or organomegaly. No evidence of abdominal wall hernias.    Extremities: extremities normal, atraumatic, no cyanosis or edema   Skin: Normal.         Imaging and Lab Review:     CBC:   Lab Results   Component Value Date/Time    WBC 8.3 07/12/2020 03:50 AM    RBC 3.81 (L) 07/12/2020 03:50 AM HGB 12.2 (L) 07/12/2020 03:50 AM    HCT 35.1 (L) 07/12/2020 03:50 AM    PLATELET 987 (L) 65/70/8503 03:50 AM     BMP:   Lab Results   Component Value Date/Time    Glucose 91 07/15/2020 03:15 AM    Sodium 151 (H) 07/15/2020 03:15 AM    Potassium 3.0 (L) 07/15/2020 03:15 AM    Chloride 117 (H) 07/15/2020 03:15 AM    CO2 28 07/15/2020 03:15 AM    BUN 28 (H) 07/15/2020 03:15 AM    Creatinine 1.03 07/15/2020 03:15 AM    Calcium 7.8 (L) 07/15/2020 03:15 AM     CMP:  Lab Results   Component Value Date/Time    Glucose 91 07/15/2020 03:15 AM    Sodium 151 (H) 07/15/2020 03:15 AM    Potassium 3.0 (L) 07/15/2020 03:15 AM    Chloride 117 (H) 07/15/2020 03:15 AM    CO2 28 07/15/2020 03:15 AM    BUN 28 (H) 07/15/2020 03:15 AM    Creatinine 1.03 07/15/2020 03:15 AM    Calcium 7.8 (L) 07/15/2020 03:15 AM    Anion gap 6 07/15/2020 03:15 AM    BUN/Creatinine ratio 27 (H) 07/15/2020 03:15 AM    Alk.  phosphatase 75 07/15/2020 03:15 AM    Protein, total 5.9 (L) 07/15/2020 03:15 AM    Albumin 2.0 (L) 07/15/2020 03:15 AM    Globulin 3.9 07/15/2020 03:15 AM    A-G Ratio 0.5 (L) 07/15/2020 03:15 AM       Recent Results (from the past 24 hour(s))   GLUCOSE, POC    Collection Time: 07/14/20  3:57 PM   Result Value Ref Range    Glucose (POC) 131 (H) 70 - 110 mg/dL   GLUCOSE, POC    Collection Time: 07/14/20  9:34 PM   Result Value Ref Range    Glucose (POC) 111 (H) 70 - 421 mg/dL   METABOLIC PANEL, BASIC    Collection Time: 07/15/20  3:15 AM   Result Value Ref Range    Sodium 151 (H) 136 - 145 mmol/L    Potassium 3.0 (L) 3.5 - 5.5 mmol/L    Chloride 117 (H) 100 - 111 mmol/L    CO2 28 21 - 32 mmol/L    Anion gap 6 3.0 - 18 mmol/L    Glucose 91 74 - 99 mg/dL    BUN 28 (H) 7.0 - 18 MG/DL    Creatinine 1.03 0.6 - 1.3 MG/DL    BUN/Creatinine ratio 27 (H) 12 - 20      GFR est AA >60 >60 ml/min/1.73m2    GFR est non-AA >60 >60 ml/min/1.73m2    Calcium 7.8 (L) 8.5 - 10.1 MG/DL   HEPATIC FUNCTION PANEL    Collection Time: 07/15/20  3:15 AM   Result Value Ref Range    Protein, total 5.9 (L) 6.4 - 8.2 g/dL    Albumin 2.0 (L) 3.4 - 5.0 g/dL    Globulin 3.9 2.0 - 4.0 g/dL    A-G Ratio 0.5 (L) 0.8 - 1.7      Bilirubin, total 1.9 (H) 0.2 - 1.0 MG/DL    Bilirubin, direct 1.6 (H) 0.0 - 0.2 MG/DL    Alk. phosphatase 75 45 - 117 U/L    AST (SGOT) 50 (H) 10 - 38 U/L    ALT (SGPT) 44 16 - 61 U/L   MAGNESIUM    Collection Time: 07/15/20  3:15 AM   Result Value Ref Range    Magnesium 1.9 1.6 - 2.6 mg/dL   PHOSPHORUS    Collection Time: 07/15/20  3:15 AM   Result Value Ref Range    Phosphorus 2.2 (L) 2.5 - 4.9 MG/DL   GLUCOSE, POC    Collection Time: 07/15/20  7:25 AM   Result Value Ref Range    Glucose (POC) 100 70 - 110 mg/dL   GLUCOSE, POC    Collection Time: 07/15/20 11:28 AM   Result Value Ref Range    Glucose (POC) 87 70 - 110 mg/dL       images and reports reviewed    Assessment:   Kayla Solis is a 75 y.o. male who presented to the hospital about 6 days ago with a picture of nausea vomiting and abdominal pain as well as cough and picture of pneumonia. The CT scan showed a picture of possible small bowel obstruction most likely that was an ileus. Clinically the patient is doing great and he denies any nausea or vomiting or any abdominal pain and his labs are normal though his last white count was 3 days ago and his abdominal exam is benign and soft and nontender and nondistended. Also as stated before the patient clinically is doing well and he is tolerating regular diet and having normal bowel movements. The only abnormality is is is KUB which I think it lags behind his clinical improvement and I am not very concerned about it because the patient clinically is doing better. Also this is the first time I examined the patient so I spoke with the medicine team to make sure that he is good otherwise.    Also the patient because he is feeling great he would like to go home today which I feel from the surgical standpoint this is completely fine because of the above-mentioned points. However he has been ordered a CT scan of abdomen and pelvis and I will follow with the medicine team to see if this is needed for now to follow-up on his KUB findings of persistent small bowel obstruction, which is not a bad idea to do it before the patient is discharged just to make sure that there is no evidence of any partial bowel obstruction.      Plan:     Correction of his electrolyte abnormalities especially his hypokalemia  Follow-up on the CT scan result that has been ordered today  We will follow closely    Please call me if you have any questions (cell phone: [phone])     Signed By: Lenell Moritz, MD     July 15, 2020

## 2020-07-15 NOTE — PROGRESS NOTES
1924-Pt in bed resting alert and oriented x 4 denies pain at the moment. Assessement completed plan of care for the shift explained pt verbalized understanding. IVF infusing well, HOB elevated, bed low and in locked position. Call light and frequently used items within reach. 2100- Vitals taken, pt with temp 101.2- MEWS noted to be 3  Pt given tylenol 650 mg  Po for fever will reassess and notify mD.   2229- vitals rechecked temp 99.5 will continue to monitor. 0115- Pt sleeping  0400- Pt assisted to bathroom had bowel movement, washed up , back to bed made comfortable. Call light urinal within reach. 5689- Radiology tech arrived at bedside - x ray done. 4014- Received critical lab results for potasium 2.9  Dr Alexander Fang paged waiting call back. 0600- Pt sleeping not in distress. 6363- Dr Alexander Fang paged again waiting call back   Dr Alexander Fang returned call order received to give 3 runs of potassium  0740- Bedside and Verbal shift change report given to nic MUSA (oncoming nurse) by Myla Hernandez RN (offgoing nurse). Report included the following information SBAR, Kardex, Intake/Output, MAR and Recent Results.

## 2020-07-15 NOTE — DIABETES MGMT
GLYCEMIC CONTROL:  Suggest lowering Lantus to 8 units/day.     Recent Blood Glucose:      Current Insulin regimen:   10 units Lantus/day  Corrective lispro, normal insulin sensitivity ACHS  Home medication/insulin regimen:   Metformin 500 mg 2 tablets BID  Januvia 100 mg daily  HbA1c: 7.4% - ave BG ~ 166 mg/dL over past 3 months  Diet:  NPO (receiving IVF of D5 NS at 100 ml/hr)    Cielo Garcia RD, CDE   Office:  [address] [place] [address] Pager:  [phone]

## 2020-07-15 NOTE — PROGRESS NOTES
Problem: Diabetes Self-Management  Goal: *Disease process and treatment process  Description: Define diabetes and identify own type of diabetes; list 3 options for treating diabetes. Outcome: Progressing Towards Goal  Goal: *Incorporating nutritional management into lifestyle  Description: Describe effect of type, amount and timing of food on blood glucose; list 3 methods for planning meals. Outcome: Progressing Towards Goal  Goal: *Incorporating physical activity into lifestyle  Description: State effect of exercise on blood glucose levels. Outcome: Progressing Towards Goal  Goal: *Developing strategies to promote health/change behavior  Description: Define the ABC's of diabetes; identify appropriate screenings, schedule and personal plan for screenings. Outcome: Progressing Towards Goal  Goal: *Using medications safely  Description: State effect of diabetes medications on diabetes; name diabetes medication taking, action and side effects. Outcome: Progressing Towards Goal  Goal: *Monitoring blood glucose, interpreting and using results  Description: Identify recommended blood glucose targets  and personal targets. Outcome: Progressing Towards Goal  Goal: *Prevention, detection, treatment of acute complications  Description: List symptoms of hyper- and hypoglycemia; describe how to treat low blood sugar and actions for lowering  high blood glucose level. Outcome: Progressing Towards Goal  Goal: *Prevention, detection and treatment of chronic complications  Description: Define the natural course of diabetes and describe the relationship of blood glucose levels to long term complications of diabetes.   Outcome: Progressing Towards Goal  Goal: *Developing strategies to address psychosocial issues  Description: Describe feelings about living with diabetes; identify support needed and support network  Outcome: Progressing Towards Goal  Goal: *Insulin pump training  Outcome: Progressing Towards Goal  Goal: *Sick day guidelines  Outcome: Progressing Towards Goal  Goal: *Patient Specific Goal (EDIT GOAL, INSERT TEXT)  Outcome: Progressing Towards Goal     Problem: Patient Education: Go to Patient Education Activity  Goal: Patient/Family Education  Outcome: Progressing Towards Goal     Problem: Falls - Risk of  Goal: *Absence of Falls  Description: Document Irene Gaviria Fall Risk and appropriate interventions in the flowsheet. Outcome: Progressing Towards Goal  Note: Fall Risk Interventions:  Mobility Interventions: Patient to call before getting OOB         Medication Interventions: Patient to call before getting OOB    Elimination Interventions: Call light in reach, Patient to call for help with toileting needs              Problem: Patient Education: Go to Patient Education Activity  Goal: Patient/Family Education  Outcome: Progressing Towards Goal     Problem: Discharge Planning  Goal: *Discharge to safe environment  Outcome: Progressing Towards Goal     Problem: Pressure Injury - Risk of  Goal: *Prevention of pressure injury  Description: Document Rai Scale and appropriate interventions in the flowsheet.   Outcome: Progressing Towards Goal  Note: Pressure Injury Interventions:       Moisture Interventions: Absorbent underpads    Activity Interventions: Increase time out of bed, Pressure redistribution bed/mattress(bed type)    Mobility Interventions: HOB 30 degrees or less, Pressure redistribution bed/mattress (bed type)    Nutrition Interventions: Document food/fluid/supplement intake                     Problem: Patient Education: Go to Patient Education Activity  Goal: Patient/Family Education  Outcome: Progressing Towards Goal     Problem: Nutrition Deficit  Goal: *Optimize nutritional status  Outcome: Progressing Towards Goal

## 2020-07-16 ENCOUNTER — APPOINTMENT (OUTPATIENT)
Dept: GENERAL RADIOLOGY | Age: 65
DRG: 229 | End: 2020-07-16
Attending: INTERNAL MEDICINE
Payer: MEDICAID

## 2020-07-16 ENCOUNTER — APPOINTMENT (OUTPATIENT)
Dept: GENERAL RADIOLOGY | Age: 65
DRG: 229 | End: 2020-07-16
Attending: SURGERY
Payer: MEDICAID

## 2020-07-16 LAB
ALBUMIN SERPL-MCNC: 2 G/DL (ref 3.4–5)
ALBUMIN/GLOB SERPL: 0.5 {RATIO} (ref 0.8–1.7)
ALP SERPL-CCNC: 72 U/L (ref 45–117)
ALT SERPL-CCNC: 41 U/L (ref 16–61)
ANION GAP SERPL CALC-SCNC: 6 MMOL/L (ref 3–18)
AST SERPL-CCNC: 45 U/L (ref 10–38)
BASOPHILS # BLD: 0 K/UL (ref 0–0.1)
BASOPHILS NFR BLD: 0 % (ref 0–2)
BILIRUB DIRECT SERPL-MCNC: 1.7 MG/DL (ref 0–0.2)
BILIRUB SERPL-MCNC: 2 MG/DL (ref 0.2–1)
BUN SERPL-MCNC: 24 MG/DL (ref 7–18)
BUN/CREAT SERPL: 23 (ref 12–20)
CALCIUM SERPL-MCNC: 7.7 MG/DL (ref 8.5–10.1)
CHLORIDE SERPL-SCNC: 111 MMOL/L (ref 100–111)
CO2 SERPL-SCNC: 28 MMOL/L (ref 21–32)
CREAT SERPL-MCNC: 1.05 MG/DL (ref 0.6–1.3)
DIFFERENTIAL METHOD BLD: ABNORMAL
EOSINOPHIL # BLD: 0.1 K/UL (ref 0–0.4)
EOSINOPHIL NFR BLD: 1 % (ref 0–5)
ERYTHROCYTE [DISTWIDTH] IN BLOOD BY AUTOMATED COUNT: 13.1 % (ref 11.6–14.5)
GLOBULIN SER CALC-MCNC: 3.9 G/DL (ref 2–4)
GLUCOSE BLD STRIP.AUTO-MCNC: 109 MG/DL (ref 70–110)
GLUCOSE BLD STRIP.AUTO-MCNC: 138 MG/DL (ref 70–110)
GLUCOSE BLD STRIP.AUTO-MCNC: 155 MG/DL (ref 70–110)
GLUCOSE BLD STRIP.AUTO-MCNC: 192 MG/DL (ref 70–110)
GLUCOSE SERPL-MCNC: 143 MG/DL (ref 74–99)
HCT VFR BLD AUTO: 32.7 % (ref 36–48)
HGB BLD-MCNC: 10.8 G/DL (ref 13–16)
INR PPP: 1.2 (ref 0.8–1.2)
LIPASE SERPL-CCNC: 535 U/L (ref 73–393)
LYMPHOCYTES # BLD: 1.4 K/UL (ref 0.9–3.6)
LYMPHOCYTES NFR BLD: 16 % (ref 21–52)
MAGNESIUM SERPL-MCNC: 1.8 MG/DL (ref 1.6–2.6)
MCH RBC QN AUTO: 31.6 PG (ref 24–34)
MCHC RBC AUTO-ENTMCNC: 33 G/DL (ref 31–37)
MCV RBC AUTO: 95.6 FL (ref 74–97)
MONOCYTES # BLD: 0.8 K/UL (ref 0.05–1.2)
MONOCYTES NFR BLD: 10 % (ref 3–10)
NEUTS SEG # BLD: 6.2 K/UL (ref 1.8–8)
NEUTS SEG NFR BLD: 73 % (ref 40–73)
PHOSPHATE SERPL-MCNC: 2.7 MG/DL (ref 2.5–4.9)
PLATELET # BLD AUTO: 136 K/UL (ref 135–420)
PMV BLD AUTO: 11.4 FL (ref 9.2–11.8)
POTASSIUM SERPL-SCNC: 2.9 MMOL/L (ref 3.5–5.5)
PROT SERPL-MCNC: 5.9 G/DL (ref 6.4–8.2)
PROTHROMBIN TIME: 14.8 SEC (ref 11.5–15.2)
RBC # BLD AUTO: 3.42 M/UL (ref 4.7–5.5)
SODIUM SERPL-SCNC: 145 MMOL/L (ref 136–145)
WBC # BLD AUTO: 8.5 K/UL (ref 4.6–13.2)

## 2020-07-16 PROCEDURE — 83690 ASSAY OF LIPASE: CPT

## 2020-07-16 PROCEDURE — 74018 RADEX ABDOMEN 1 VIEW: CPT

## 2020-07-16 PROCEDURE — 36415 COLL VENOUS BLD VENIPUNCTURE: CPT

## 2020-07-16 PROCEDURE — 74011636637 HC RX REV CODE- 636/637: Performed by: INTERNAL MEDICINE

## 2020-07-16 PROCEDURE — 74011250637 HC RX REV CODE- 250/637: Performed by: INTERNAL MEDICINE

## 2020-07-16 PROCEDURE — 65270000029 HC RM PRIVATE

## 2020-07-16 PROCEDURE — 74011250636 HC RX REV CODE- 250/636: Performed by: HOSPITALIST

## 2020-07-16 PROCEDURE — 74011250636 HC RX REV CODE- 250/636: Performed by: INTERNAL MEDICINE

## 2020-07-16 PROCEDURE — 74011000258 HC RX REV CODE- 258: Performed by: INTERNAL MEDICINE

## 2020-07-16 PROCEDURE — 83735 ASSAY OF MAGNESIUM: CPT

## 2020-07-16 PROCEDURE — C9113 INJ PANTOPRAZOLE SODIUM, VIA: HCPCS | Performed by: INTERNAL MEDICINE

## 2020-07-16 PROCEDURE — 77030040392 HC DRSG OPTIFOAM MDII -A

## 2020-07-16 PROCEDURE — 71046 X-RAY EXAM CHEST 2 VIEWS: CPT

## 2020-07-16 PROCEDURE — 80048 BASIC METABOLIC PNL TOTAL CA: CPT

## 2020-07-16 PROCEDURE — 85025 COMPLETE CBC W/AUTO DIFF WBC: CPT

## 2020-07-16 PROCEDURE — 85610 PROTHROMBIN TIME: CPT

## 2020-07-16 PROCEDURE — 84100 ASSAY OF PHOSPHORUS: CPT

## 2020-07-16 PROCEDURE — 80076 HEPATIC FUNCTION PANEL: CPT

## 2020-07-16 PROCEDURE — 82962 GLUCOSE BLOOD TEST: CPT

## 2020-07-16 RX ORDER — POTASSIUM CHLORIDE 7.45 MG/ML
10 INJECTION INTRAVENOUS
Status: COMPLETED | OUTPATIENT
Start: 2020-07-16 | End: 2020-07-16

## 2020-07-16 RX ORDER — POTASSIUM CHLORIDE 7.45 MG/ML
10 INJECTION INTRAVENOUS
Status: DISPENSED | OUTPATIENT
Start: 2020-07-16 | End: 2020-07-16

## 2020-07-16 RX ADMIN — AMLODIPINE BESYLATE 10 MG: 10 TABLET ORAL at 10:05

## 2020-07-16 RX ADMIN — PIPERACILLIN AND TAZOBACTAM 3.38 G: 3; .375 INJECTION, POWDER, LYOPHILIZED, FOR SOLUTION INTRAVENOUS at 12:00

## 2020-07-16 RX ADMIN — INSULIN LISPRO 2 UNITS: 100 INJECTION, SOLUTION INTRAVENOUS; SUBCUTANEOUS at 12:04

## 2020-07-16 RX ADMIN — PIPERACILLIN AND TAZOBACTAM 3.38 G: 3; .375 INJECTION, POWDER, LYOPHILIZED, FOR SOLUTION INTRAVENOUS at 16:55

## 2020-07-16 RX ADMIN — CLONIDINE HYDROCHLORIDE 0.1 MG: 0.1 TABLET ORAL at 10:05

## 2020-07-16 RX ADMIN — NIFEDIPINE 90 MG: 30 TABLET, FILM COATED, EXTENDED RELEASE ORAL at 10:05

## 2020-07-16 RX ADMIN — METOPROLOL TARTRATE 50 MG: 50 TABLET, FILM COATED ORAL at 17:00

## 2020-07-16 RX ADMIN — CLONIDINE HYDROCHLORIDE 0.1 MG: 0.1 TABLET ORAL at 17:00

## 2020-07-16 RX ADMIN — INSULIN GLARGINE 8 UNITS: 100 INJECTION, SOLUTION SUBCUTANEOUS at 10:04

## 2020-07-16 RX ADMIN — TAMSULOSIN HYDROCHLORIDE 0.4 MG: 0.4 CAPSULE ORAL at 10:06

## 2020-07-16 RX ADMIN — PANTOPRAZOLE SODIUM 40 MG: 40 INJECTION, POWDER, FOR SOLUTION INTRAVENOUS at 16:06

## 2020-07-16 RX ADMIN — POTASSIUM CHLORIDE 10 MEQ: 10 INJECTION, SOLUTION INTRAVENOUS at 10:07

## 2020-07-16 RX ADMIN — METOPROLOL TARTRATE 50 MG: 50 TABLET, FILM COATED ORAL at 10:05

## 2020-07-16 RX ADMIN — DEXTROSE MONOHYDRATE AND POTASSIUM CHLORIDE: 5; .149 INJECTION, SOLUTION INTRAVENOUS at 20:18

## 2020-07-16 RX ADMIN — POLYETHYLENE GLYCOL 3350 17 G: 17 POWDER, FOR SOLUTION ORAL at 10:06

## 2020-07-16 RX ADMIN — POTASSIUM CHLORIDE 10 MEQ: 10 INJECTION, SOLUTION INTRAVENOUS at 11:49

## 2020-07-16 RX ADMIN — INSULIN LISPRO 2 UNITS: 100 INJECTION, SOLUTION INTRAVENOUS; SUBCUTANEOUS at 22:03

## 2020-07-16 RX ADMIN — LATANOPROST 1 DROP: 50 SOLUTION OPHTHALMIC at 22:10

## 2020-07-16 RX ADMIN — POTASSIUM CHLORIDE 10 MEQ: 10 INJECTION, SOLUTION INTRAVENOUS at 13:09

## 2020-07-16 RX ADMIN — DEXTROSE MONOHYDRATE AND POTASSIUM CHLORIDE: 5; .149 INJECTION, SOLUTION INTRAVENOUS at 03:58

## 2020-07-16 NOTE — PROGRESS NOTES
Internal Medicine Progress Note        NAME: Halle Eye   :  1955  MRM:  [id number]    Date/Time: 2020        ASSESSMENT/PLAN: strange case with abnormal KUB and CXR but patient asymptomatic apart from spiking temp. Principal Problem:    Stage 3 acute kidney injury ([facility] [address] (2020)    Active Problems:    Essential hypertension (2013)      Type II diabetes mellitus ([facility] [address] (2013)      Benign hypertension ()      Hyponatremia (2020)      Hyperglycemia (2020)      Community acquired pneumonia of right middle lobe of lung (2020)      Former smoker (2020)      BPH (benign prostatic hyperplasia) (2020)        # SBO. Appreciate surgery consult, dw surgery  repeat CT abd/pelvic finding also serial KUB monitoered. Patient continue to be  asymptomatic. Keep npo except meds. IVF. Passing BM. Mobilization. Maintain K/lytes level           # RML pneumonia. Follow CXR. Maintain O2.     - culture of blood NTD  - spiking of temp. I will start him on iv zosyn. Nurses instructed to check BC if spiked 101 or more. # Hypokalemia. Replete and trend     # stage 3 PRADEEP/CKD3 . Improving  with hydration. Continue to improve. Continue hydration. Monitor I/O    Monitor Renal function and other labs as indicated. Avoid nephrotoxins , iv Contrast, NSAID. Renally dosing medications. Monitor urine out put. # DM. Provide SSI, hypoglycemia protocol and frequent Accu checks. Provide SSI, hypoglycemia protocol and frequent Accu checks. Education,  diabetic educator  . Diabetic Diet     # TCP. Observe    # Nausea not controlled by zofran. Added phenergan     # Mild hypernatremia. Changed ivf to D5 Kcl.   Improved      # PT        - Cont acceptable home medications for chronic conditions   - DVT protocol    IP CONSULT TO HOSPITALIST  IP CONSULT TO GENERAL SURGERY    Lab Review:     Recent Labs     20  0345   WBC 8.5   HGB 10.8*   HCT 32.7*        Recent Labs 07/16/20  0345 07/15/20  0315 07/14/20  0355    151* 147*   K 2.9* 3.0* 3.0*    117* 112*   CO2 28 28 31   * 91 179*   BUN 24* 28* 39*   CREA 1.05 1.03 1.30   CA 7.7* 7.8* 8.8   MG 1.8 1.9  --    PHOS 2.7 2.2*  --    ALB 2.0* 2.0*  --    TBILI 2.0* 1.9*  --    ALT 41 44  --    INR 1.2  --   --      Lab Results   Component Value Date/Time    Glucose (POC) 138 (H) 07/16/2020 07:53 AM    Glucose (POC) 124 (H) 07/15/2020 09:22 PM    Glucose (POC) 123 (H) 07/15/2020 04:21 PM    Glucose (POC) 87 07/15/2020 11:28 AM    Glucose (POC) 100 07/15/2020 07:25 AM     No results for input(s): PH, PCO2, PO2, HCO3, FIO2 in the last 72 hours. Recent Labs     07/16/20 0345   INR 1.2       No results found for: SDES  Lab Results   Component Value Date/Time    Culture result: NO GROWTH 5 DAYS 07/11/2020 04:04 PM    Culture result: NO GROWTH 5 DAYS 07/11/2020 04:04 PM      All Cardiac Markers in the last 24 hours: No results found for: CPK, CK, CKMMB, CKMB, RCK3, CKMBT, CKNDX, CKND1, TRE, TROPT, TROIQ, YOUSUF, TROPT, TNIPOC, BNP, BNPP      Intervals noted   Pt s/e @ bedside     Subjective:     Chief Complaint:      Maral Medina to eat and go home  Deny any complain    ROS:  (bold if positive,otherwise negative). Fever/chills ,  Dysuria   Cough , Sputum , SOB/HUNT , Chest Pain     Diarrhea ,Nausea/Vomit , Abd Pain , Constipation            Objective:     Vitals:  Last 24hrs VS reviewed since prior progress note.  Most recent are:    Visit Vitals  /68 (BP 1 Location: Right arm, BP Patient Position: At rest)   Pulse 73   Temp 99.8 °F (37.7 °C)   Resp 16   Ht 5' 10\" (1.778 m)   Wt 78.9 kg (174 lb)   SpO2 97%   BMI 24.97 kg/m²     SpO2 Readings from Last 6 Encounters:   07/16/20 97%   06/30/20 98%   12/22/19 97%   07/16/19 96%   05/13/19 93%   04/16/19 98%            Intake/Output Summary (Last 24 hours) at 7/16/2020 1009  Last data filed at 7/16/2020 0433  Gross per 24 hour   Intake 1531.67 ml   Output 1100 ml   Net 431.67 ml          Physical Exam:   General Appearance: NAD, conversant  Lungs: CTA with normal respiratory effort  CV: RRR, no m/r/g  Abdomen: soft, less tenderness lower area , less distended , doughy feeling  , active bowel sounds  Extremities: no cyanosis, no peripheral edema  Neuro: No focal deficits, motor/sensory intact.      Medications Reviewed: (see below)    Lab Data Reviewed: (see below)    ______________________________________________________________________    Medications:     Current Facility-Administered Medications   Medication Dose Route Frequency    insulin glargine (LANTUS) injection 8 Units  8 Units SubCUTAneous DAILY    dextrose 5% with KCl 20 mEq/L infusion   IntraVENous CONTINUOUS    pantoprazole (PROTONIX) injection 40 mg  40 mg IntraVENous Q24H    cloNIDine HCL (CATAPRES) tablet 0.1 mg  0.1 mg Oral BID    NIFEdipine ER (PROCARDIA XL) tablet 90 mg  90 mg Oral DAILY    polyethylene glycol (MIRALAX) packet 17 g  17 g Oral DAILY    promethazine (PHENERGAN) injection 25 mg  25 mg IntraMUSCular Q6H PRN    hydrALAZINE (APRESOLINE) 20 mg/mL injection 10 mg  10 mg IntraVENous Q6H PRN    ondansetron (ZOFRAN) injection 4 mg  4 mg IntraVENous Q4H PRN    tamsulosin (FLOMAX) capsule 0.4 mg  0.4 mg Oral DAILY    latanoprost (XALATAN) 0.005 % ophthalmic solution 1 Drop  1 Drop Left Eye QHS    metoprolol tartrate (LOPRESSOR) tablet 50 mg  50 mg Oral BID    amLODIPine (NORVASC) tablet 10 mg  10 mg Oral DAILY    insulin lispro (HUMALOG) injection   SubCUTAneous AC&HS    glucose chewable tablet 16 g  4 Tab Oral PRN    glucagon (GLUCAGEN) injection 1 mg  1 mg IntraMUSCular PRN    dextrose 10% infusion 125-250 mL  125-250 mL IntraVENous PRN    docusate sodium (COLACE) capsule 100 mg  100 mg Oral BID PRN    melatonin tablet 12 mg  12 mg Oral QHS PRN    albuterol-ipratropium (DUO-NEB) 2.5 MG-0.5 MG/3 ML  3 mL Nebulization Q4H PRN    acetaminophen (TYLENOL) tablet 650 mg  650 mg Oral Q6H PRN Total time spent with patient: 35 minutes. Care Plan discussed with: Patient, Care Manager, Nursing Staff and >50% of time spent in counseling and coordination of care DW surgery     Discussed:  Care Plan    Prophylaxis:  SCD's    Disposition:  Home w/Family           This document in whole or part of it has been produced using voice recognition software. Unrecognized errors in transcription may be present.     Attending Physician: eRmington Mccarthy MD

## 2020-07-16 NOTE — PROGRESS NOTES
completed follow up visit with and Spiritual assessment of patient in room [id number] today and offered Pastoral care support. Patient in good spirits and was very pleased to have a visit from the . Patient was also appreciative of the prayers over the PA system this morning. Patient is hoping to go home this afternoon.  Chaplains will continue to follow and will provide pastoral care on an as needed/requested basis    otto Painter   Board Certified [facility]   [phone]

## 2020-07-16 NOTE — PROGRESS NOTES
General Surgery Consult      Carlene Miranda  Admit date: 7/10/2020    MRN: [id number]     : 1955     Age: 75 y.o. Attending Physician: Kathleen De La Rosa MD, FACS      Subjective:     Carlene Miranda is a 75 y.o. male who I am following for a picture of SBO vs. Ileus. When I saw the patient for the first time yesterday, he was doing great clinically. He was afebrile, normal vitals (no tachycardia, and mild hypertension), normal WBC (3 days ago), feeling well clinically, passing flatus, having bowel movements, and feeling hungry. Not even that, but he wanted to go home. But because of his KUB that showed persistent SBO, a CT scan was ordered. Surprisingly his CT scan results is not good. It showed dilated loops of small bowel with transition point and swirling in the central mesentery concerning for small bowel obstruction. Normal caliber large bowel loops. No pneumatosis or portal venous gas. Normal appendix. Today I spoke with the patient for more than 20 minutes. I took history again from him and interviewed him again. He said he was doing well before last week. He had no abdominal pain in the past and he has no weight loss. He also said that he still feels great, he is hungry, he has zero abdominal pain. He wants to eat and go home. He did not pass flatus overnight but denies any nausea or vomiting. He did have fever again overnight and one episode of tachycardia.  But now his vitals are normal. His WBC is normal. His creatinine is normal.     Patient Active Problem List    Diagnosis Date Noted    Hyponatremia 2020    Hyperglycemia 2020    Stage 3 acute kidney injury ([facility] [address] 2020    Community acquired pneumonia of right middle lobe of lung 2020    Former smoker 2020    BPH (benign prostatic hyperplasia) 2020    Frequency of micturition     Incomplete bladder emptying     Urgency of micturition     Hypogonadism in male 2017    Nocturia 2017    Urge incontinence 2017    Effusion of knee 2016    Erectile dysfunction 2016    Enlarged heart     Coronary artery disease     Musculoskeletal pain     Wrist joint pain     Arthritis     Benign hypertension     Impotence     Malignant hypertension 2013    Chest pain 2013    Syncope 2013    Essential hypertension 2013    Type II diabetes mellitus ([facility] [address] 2013     Past Medical History:   Diagnosis Date    Arthritis     Benign hypertension     Coronary artery disease     Diabetes mellitus ([facility] [address]     Enlarged heart     Erectile dysfunction     Frequency of micturition     Hypogonadism in male     Impotence     Incomplete bladder emptying     Musculoskeletal pain     Nocturia     Urgency of micturition     Wrist joint pain       Past Surgical History:   Procedure Laterality Date    HX CARPAL TUNNEL RELEASE      HX HERNIA REPAIR        Social History     Tobacco Use    Smoking status: Former Smoker     Packs/day: 0.50     Years: 5.00     Pack years: 2.50     Types: Cigarettes     Last attempt to quit: 1976     Years since quittin.5    Smokeless tobacco: Never Used   Substance Use Topics    Alcohol use: Yes     Comment: occassionally      Social History     Tobacco Use   Smoking Status Former Smoker    Packs/day: 0.50    Years: 5.00    Pack years: 2.50    Types: Cigarettes    Last attempt to quit: 1976    Years since quittin.5   Smokeless Tobacco Never Used     Family History   Problem Relation Age of Onset    Diabetes Mother     Hypertension Mother     Heart Failure Mother     High Cholesterol Mother     Stroke Mother     Other Mother         vision problems    Diabetes Father     Hypertension Father     Diabetes Sister     HIV/AIDS Sister     Hypertension Sister     HIV/AIDS Sister     No Known Problems Brother     HIV/AIDS Sister     Diabetes Maternal Uncle     Hypertension Maternal Uncle       Current Facility-Administered Medications   Medication Dose Route Frequency    potassium chloride 10 mEq in 100 ml IVPB  10 mEq IntraVENous Q1H    insulin glargine (LANTUS) injection 8 Units  8 Units SubCUTAneous DAILY    dextrose 5% with KCl 20 mEq/L infusion   IntraVENous CONTINUOUS    pantoprazole (PROTONIX) injection 40 mg  40 mg IntraVENous Q24H    cloNIDine HCL (CATAPRES) tablet 0.1 mg  0.1 mg Oral BID    NIFEdipine ER (PROCARDIA XL) tablet 90 mg  90 mg Oral DAILY    polyethylene glycol (MIRALAX) packet 17 g  17 g Oral DAILY    promethazine (PHENERGAN) injection 25 mg  25 mg IntraMUSCular Q6H PRN    hydrALAZINE (APRESOLINE) 20 mg/mL injection 10 mg  10 mg IntraVENous Q6H PRN    ondansetron (ZOFRAN) injection 4 mg  4 mg IntraVENous Q4H PRN    tamsulosin (FLOMAX) capsule 0.4 mg  0.4 mg Oral DAILY    latanoprost (XALATAN) 0.005 % ophthalmic solution 1 Drop  1 Drop Left Eye QHS    metoprolol tartrate (LOPRESSOR) tablet 50 mg  50 mg Oral BID    amLODIPine (NORVASC) tablet 10 mg  10 mg Oral DAILY    insulin lispro (HUMALOG) injection   SubCUTAneous AC&HS    glucose chewable tablet 16 g  4 Tab Oral PRN    glucagon (GLUCAGEN) injection 1 mg  1 mg IntraMUSCular PRN    dextrose 10% infusion 125-250 mL  125-250 mL IntraVENous PRN    docusate sodium (COLACE) capsule 100 mg  100 mg Oral BID PRN    melatonin tablet 12 mg  12 mg Oral QHS PRN    albuterol-ipratropium (DUO-NEB) 2.5 MG-0.5 MG/3 ML  3 mL Nebulization Q4H PRN    acetaminophen (TYLENOL) tablet 650 mg  650 mg Oral Q6H PRN      Allergies   Allergen Reactions    Aspirin Anaphylaxis     Fatal    Lisinopril Cough        Review of Systems:  Pertinent items are noted in the History of Present Illness.       Objective:     Visit Vitals  /68 (BP 1 Location: Right arm, BP Patient Position: At rest)   Pulse 97   Temp 98.8 °F (37.1 °C)   Resp 16   Ht 5' 10\" (1.778 m)   Wt 78.9 kg (174 lb)   SpO2 98%   BMI 24.97 kg/m²       Physical Exam:      General:  in no apparent distress, alert, oriented times 3 and cooperative   Eyes:  conjunctivae and sclerae normal, pupils equal, round, reactive to light   Throat & Neck: no erythema or exudates noted and neck supple and symmetrical; no palpable masses   Lungs:   clear to auscultation bilaterally   Heart:  Regular rate and rhythm   Abdomen:   flat, soft, nontender, nondistended, no masses or organomegaly. Again no abdominal wall hernias. Extremities: extremities normal, atraumatic, no cyanosis or edema   Skin: Normal.         Imaging and Lab Review:     CBC:   Lab Results   Component Value Date/Time    WBC 8.5 07/16/2020 03:45 AM    RBC 3.42 (L) 07/16/2020 03:45 AM    HGB 10.8 (L) 07/16/2020 03:45 AM    HCT 32.7 (L) 07/16/2020 03:45 AM    PLATELET 406 64/65/4379 03:45 AM     BMP:   Lab Results   Component Value Date/Time    Glucose 143 (H) 07/16/2020 03:45 AM    Sodium 145 07/16/2020 03:45 AM    Potassium 2.9 (LL) 07/16/2020 03:45 AM    Chloride 111 07/16/2020 03:45 AM    CO2 28 07/16/2020 03:45 AM    BUN 24 (H) 07/16/2020 03:45 AM    Creatinine 1.05 07/16/2020 03:45 AM    Calcium 7.7 (L) 07/16/2020 03:45 AM     CMP:  Lab Results   Component Value Date/Time    Glucose 143 (H) 07/16/2020 03:45 AM    Sodium 145 07/16/2020 03:45 AM    Potassium 2.9 (LL) 07/16/2020 03:45 AM    Chloride 111 07/16/2020 03:45 AM    CO2 28 07/16/2020 03:45 AM    BUN 24 (H) 07/16/2020 03:45 AM    Creatinine 1.05 07/16/2020 03:45 AM    Calcium 7.7 (L) 07/16/2020 03:45 AM    Anion gap 6 07/16/2020 03:45 AM    BUN/Creatinine ratio 23 (H) 07/16/2020 03:45 AM    Alk.  phosphatase 72 07/16/2020 03:45 AM    Protein, total 5.9 (L) 07/16/2020 03:45 AM    Albumin 2.0 (L) 07/16/2020 03:45 AM    Globulin 3.9 07/16/2020 03:45 AM    A-G Ratio 0.5 (L) 07/16/2020 03:45 AM       Recent Results (from the past 24 hour(s))   GLUCOSE, POC    Collection Time: 07/15/20 11:28 AM   Result Value Ref Range    Glucose (POC) 87 70 - 110 mg/dL   GLUCOSE, POC    Collection Time: 07/15/20  4:21 PM   Result Value Ref Range    Glucose (POC) 123 (H) 70 - 110 mg/dL   GLUCOSE, POC    Collection Time: 07/15/20  9:22 PM   Result Value Ref Range    Glucose (POC) 124 (H) 70 - 110 mg/dL   CBC WITH AUTOMATED DIFF    Collection Time: 07/16/20  3:45 AM   Result Value Ref Range    WBC 8.5 4.6 - 13.2 K/uL    RBC 3.42 (L) 4.70 - 5.50 M/uL    HGB 10.8 (L) 13.0 - 16.0 g/dL    HCT 32.7 (L) 36.0 - 48.0 %    MCV 95.6 74.0 - 97.0 FL    MCH 31.6 24.0 - 34.0 PG    MCHC 33.0 31.0 - 37.0 g/dL    RDW 13.1 11.6 - 14.5 %    PLATELET 958 837 - 062 K/uL    MPV 11.4 9.2 - 11.8 FL    NEUTROPHILS 73 40 - 73 %    LYMPHOCYTES 16 (L) 21 - 52 %    MONOCYTES 10 3 - 10 %    EOSINOPHILS 1 0 - 5 %    BASOPHILS 0 0 - 2 %    ABS. NEUTROPHILS 6.2 1.8 - 8.0 K/UL    ABS. LYMPHOCYTES 1.4 0.9 - 3.6 K/UL    ABS. MONOCYTES 0.8 0.05 - 1.2 K/UL    ABS. EOSINOPHILS 0.1 0.0 - 0.4 K/UL    ABS.  BASOPHILS 0.0 0.0 - 0.1 K/UL    DF AUTOMATED     METABOLIC PANEL, BASIC    Collection Time: 07/16/20  3:45 AM   Result Value Ref Range    Sodium 145 136 - 145 mmol/L    Potassium 2.9 (LL) 3.5 - 5.5 mmol/L    Chloride 111 100 - 111 mmol/L    CO2 28 21 - 32 mmol/L    Anion gap 6 3.0 - 18 mmol/L    Glucose 143 (H) 74 - 99 mg/dL    BUN 24 (H) 7.0 - 18 MG/DL    Creatinine 1.05 0.6 - 1.3 MG/DL    BUN/Creatinine ratio 23 (H) 12 - 20      GFR est AA >60 >60 ml/min/1.73m2    GFR est non-AA >60 >60 ml/min/1.73m2    Calcium 7.7 (L) 8.5 - 10.1 MG/DL   MAGNESIUM    Collection Time: 07/16/20  3:45 AM   Result Value Ref Range    Magnesium 1.8 1.6 - 2.6 mg/dL   PHOSPHORUS    Collection Time: 07/16/20  3:45 AM   Result Value Ref Range    Phosphorus 2.7 2.5 - 4.9 MG/DL   HEPATIC FUNCTION PANEL    Collection Time: 07/16/20  3:45 AM   Result Value Ref Range    Protein, total 5.9 (L) 6.4 - 8.2 g/dL    Albumin 2.0 (L) 3.4 - 5.0 g/dL    Globulin 3.9 2.0 - 4.0 g/dL    A-G Ratio 0.5 (L) 0.8 - 1.7      Bilirubin, total 2.0 (H) 0.2 - 1.0 MG/DL    Bilirubin, direct 1.7 (H) 0.0 - 0.2 MG/DL    Alk. phosphatase 72 45 - 117 U/L    AST (SGOT) 45 (H) 10 - 38 U/L    ALT (SGPT) 41 16 - 61 U/L   LIPASE    Collection Time: 07/16/20  3:45 AM   Result Value Ref Range    Lipase 535 (H) 73 - 393 U/L   PROTHROMBIN TIME + INR    Collection Time: 07/16/20  3:45 AM   Result Value Ref Range    Prothrombin time 14.8 11.5 - 15.2 sec    INR 1.2 0.8 - 1.2         images and reports reviewed    Assessment:   Fior Matt is a 75 y.o. male is presenting with a very strange picture. Clinically he is doing great. He is not having any abdominal pain, he is hungry, passing flatus and had BM yesterday, his WBC is normal, he had one episode of fever and tachycardia (It could be from his pneumonia) but he is afebrile and not tachycardic now. However his CT scan is very concerning. Not only it showed small bowel obstruction, but also it showed a transition point, and even more worrisome to me it that his ascites has increased. If the patient had any abdominal pain, nausea, or distention, I think we should take him to surgery today. However I am surprised that he is doing great clinically again. I had all this discussion with the patient and he is very nice and understanding. Plan:     Keep NPO for now till we figure out what is the next step  I am going to go to the radiology team and discuss with them his CT scan and review it again  Close observation for his fever and tachycardia (Most likely secondary to his fever)  IV fluids  Any worsening of his clinical condition, than he needs surgical exploration. May consider SBS to evaluate the obstruction  If he does well, I may even start him on clear liquid diet and see how he does.   I will follow him closely today    Please call me if you have any questions (cell phone: [phone])     Signed By: Anabell Christianson MD     July 16, 2020

## 2020-07-16 NOTE — PROGRESS NOTES
-- Bedside, Verbal and Written shift change report given to [facility] (oncoming nurse) by Shiv Daley RN (offgoing nurse). Report included the following information SBAR, Kardex, Intake/Output, MAR and Recent Results. Pt in bed, alert, no distress noted, frequently used items witin reach. 2203-- PM medications administered, pt tolerated with ease, will continue to monitor. 0018 -- Shift reassessment, pt condition unchanged, will continue to monitor. 0144--PRN pain medicine administered per Pt request.    0254--Paged MD about Pt's severe abdominal pain. No new orders received.  --0255--Pt having nausea and small amount of green vomitus.    --0302--PRN zofran administered. 0600--Verbal Orders received from Dr Brian Mcbride to change diet orders to NPO and insert NG tube for abdominal decompression. 0635--NG tube inserted and connected to suction. -- Bedside, Verbal and Written shift change report given to [address] [place] [address] (oncoming nurse) by [facility] RN (offgoing nurse). Report included the following information SBAR, Kardex, Intake/Output, MAR and Recent Results. Skin assessment completed.

## 2020-07-16 NOTE — PROGRESS NOTES
Problem: Diabetes Self-Management  Goal: *Disease process and treatment process  Description: Define diabetes and identify own type of diabetes; list 3 options for treating diabetes. Outcome: Progressing Towards Goal  Goal: *Incorporating nutritional management into lifestyle  Description: Describe effect of type, amount and timing of food on blood glucose; list 3 methods for planning meals. Outcome: Progressing Towards Goal  Goal: *Incorporating physical activity into lifestyle  Description: State effect of exercise on blood glucose levels. Outcome: Progressing Towards Goal  Goal: *Developing strategies to promote health/change behavior  Description: Define the ABC's of diabetes; identify appropriate screenings, schedule and personal plan for screenings. Outcome: Progressing Towards Goal  Goal: *Using medications safely  Description: State effect of diabetes medications on diabetes; name diabetes medication taking, action and side effects. Outcome: Progressing Towards Goal  Goal: *Monitoring blood glucose, interpreting and using results  Description: Identify recommended blood glucose targets  and personal targets. Outcome: Progressing Towards Goal  Goal: *Prevention, detection, treatment of acute complications  Description: List symptoms of hyper- and hypoglycemia; describe how to treat low blood sugar and actions for lowering  high blood glucose level. Outcome: Progressing Towards Goal  Goal: *Prevention, detection and treatment of chronic complications  Description: Define the natural course of diabetes and describe the relationship of blood glucose levels to long term complications of diabetes.   Outcome: Progressing Towards Goal  Goal: *Developing strategies to address psychosocial issues  Description: Describe feelings about living with diabetes; identify support needed and support network  Outcome: Progressing Towards Goal  Goal: *Insulin pump training  Outcome: Progressing Towards Goal  Goal: *Sick day guidelines  Outcome: Progressing Towards Goal  Goal: *Patient Specific Goal (EDIT GOAL, INSERT TEXT)  Outcome: Progressing Towards Goal     Problem: Diabetes Self-Management  Goal: *Incorporating nutritional management into lifestyle  Description: Describe effect of type, amount and timing of food on blood glucose; list 3 methods for planning meals. Outcome: Progressing Towards Goal     Problem: Diabetes Self-Management  Goal: *Incorporating physical activity into lifestyle  Description: State effect of exercise on blood glucose levels. Outcome: Progressing Towards Goal     Problem: Diabetes Self-Management  Goal: *Developing strategies to promote health/change behavior  Description: Define the ABC's of diabetes; identify appropriate screenings, schedule and personal plan for screenings. Outcome: Progressing Towards Goal     Problem: Diabetes Self-Management  Goal: *Using medications safely  Description: State effect of diabetes medications on diabetes; name diabetes medication taking, action and side effects. Outcome: Progressing Towards Goal     Problem: Diabetes Self-Management  Goal: *Prevention, detection and treatment of chronic complications  Description: Define the natural course of diabetes and describe the relationship of blood glucose levels to long term complications of diabetes. Outcome: Progressing Towards Goal     Problem: Falls - Risk of  Goal: *Absence of Falls  Description: Document Irene Gaviria Fall Risk and appropriate interventions in the flowsheet. Outcome: Progressing Towards Goal  Note: Fall Risk Interventions:  Mobility Interventions: Utilize walker, cane, or other assistive device         Medication Interventions: Teach patient to arise slowly    Elimination Interventions:  Toileting schedule/hourly rounds              Problem: Discharge Planning  Goal: *Discharge to safe environment  Outcome: Progressing Towards Goal     Problem: Pressure Injury - Risk of  Goal: *Prevention of pressure injury  Description: Document Rai Scale and appropriate interventions in the flowsheet.   Outcome: Progressing Towards Goal  Note: Pressure Injury Interventions:       Moisture Interventions: Absorbent underpads    Activity Interventions: Increase time out of bed    Mobility Interventions: HOB 30 degrees or less    Nutrition Interventions: Document food/fluid/supplement intake

## 2020-07-16 NOTE — PROGRESS NOTES
Spoke to Dr. Burton Holm and he reviewed the CT scan and he stated that there is bowel dilation but also the small bowel is decompressed, not only distally, but also proximally. I re-examined the patient, and his abdomen is not distended and not-tender. He was walking and stated he is hungry. Plan:   Will get A KUB

## 2020-07-16 NOTE — PROGRESS NOTES
I reviewed the patient chest x-ray and KUB. The chest x-ray showed pneumonia and the KUB showed persistent picture of bowel dilatation consistent with a small bowel obstruction versus ileus for me. His vital signs are normal today with no fever or tachycardia. I called and I spoke with his nurse and she said that he is doing great all day and he is having no abdominal pain and no nausea. He stated that he is moving around and he feels hungry and would like to eat. I placed the patient on clear liquid diet. We will observe him very closely  Repeat labs tomorrow and KUB  If there is any concern of bowel obstruction then will take him to surgery for exploration but for now I believe it is safe to start him on clear liquid because clinically he is doing extremely well  We will follow very closely.

## 2020-07-17 ENCOUNTER — ANESTHESIA EVENT (OUTPATIENT)
Dept: SURGERY | Age: 65
DRG: 229 | End: 2020-07-17
Payer: MEDICAID

## 2020-07-17 ENCOUNTER — ANESTHESIA (OUTPATIENT)
Dept: SURGERY | Age: 65
DRG: 229 | End: 2020-07-17
Payer: MEDICAID

## 2020-07-17 ENCOUNTER — APPOINTMENT (OUTPATIENT)
Dept: GENERAL RADIOLOGY | Age: 65
DRG: 229 | End: 2020-07-17
Attending: SURGERY
Payer: MEDICAID

## 2020-07-17 LAB
ALBUMIN SERPL-MCNC: 2.2 G/DL (ref 3.4–5)
ALBUMIN/GLOB SERPL: 0.6 {RATIO} (ref 0.8–1.7)
ALP SERPL-CCNC: 74 U/L (ref 45–117)
ALT SERPL-CCNC: 41 U/L (ref 16–61)
ANION GAP SERPL CALC-SCNC: 6 MMOL/L (ref 3–18)
AST SERPL-CCNC: 45 U/L (ref 10–38)
BACTERIA SPEC CULT: NORMAL
BACTERIA SPEC CULT: NORMAL
BASOPHILS # BLD: 0 K/UL (ref 0–0.1)
BASOPHILS NFR BLD: 0 % (ref 0–2)
BILIRUB DIRECT SERPL-MCNC: 2.2 MG/DL (ref 0–0.2)
BILIRUB SERPL-MCNC: 2.9 MG/DL (ref 0.2–1)
BUN SERPL-MCNC: 21 MG/DL (ref 7–18)
BUN/CREAT SERPL: 20 (ref 12–20)
CALCIUM SERPL-MCNC: 7.5 MG/DL (ref 8.5–10.1)
CHLORIDE SERPL-SCNC: 106 MMOL/L (ref 100–111)
CO2 SERPL-SCNC: 25 MMOL/L (ref 21–32)
COVID-19 RAPID TEST, COVR: NOT DETECTED
CREAT SERPL-MCNC: 1.06 MG/DL (ref 0.6–1.3)
DIFFERENTIAL METHOD BLD: ABNORMAL
EOSINOPHIL # BLD: 0.1 K/UL (ref 0–0.4)
EOSINOPHIL NFR BLD: 1 % (ref 0–5)
ERYTHROCYTE [DISTWIDTH] IN BLOOD BY AUTOMATED COUNT: 12.4 % (ref 11.6–14.5)
GLOBULIN SER CALC-MCNC: 3.9 G/DL (ref 2–4)
GLUCOSE BLD STRIP.AUTO-MCNC: 129 MG/DL (ref 70–110)
GLUCOSE BLD STRIP.AUTO-MCNC: 132 MG/DL (ref 70–110)
GLUCOSE BLD STRIP.AUTO-MCNC: 134 MG/DL (ref 70–110)
GLUCOSE BLD STRIP.AUTO-MCNC: 170 MG/DL (ref 70–110)
GLUCOSE BLD STRIP.AUTO-MCNC: 196 MG/DL (ref 70–110)
GLUCOSE SERPL-MCNC: 147 MG/DL (ref 74–99)
HCT VFR BLD AUTO: 33.2 % (ref 36–48)
HGB BLD-MCNC: 11.3 G/DL (ref 13–16)
LYMPHOCYTES # BLD: 1.2 K/UL (ref 0.9–3.6)
LYMPHOCYTES NFR BLD: 9 % (ref 21–52)
MAGNESIUM SERPL-MCNC: 1.6 MG/DL (ref 1.6–2.6)
MCH RBC QN AUTO: 31.7 PG (ref 24–34)
MCHC RBC AUTO-ENTMCNC: 34 G/DL (ref 31–37)
MCV RBC AUTO: 93.3 FL (ref 74–97)
MONOCYTES # BLD: 0.7 K/UL (ref 0.05–1.2)
MONOCYTES NFR BLD: 6 % (ref 3–10)
NEUTS SEG # BLD: 11 K/UL (ref 1.8–8)
NEUTS SEG NFR BLD: 84 % (ref 40–73)
PHOSPHATE SERPL-MCNC: 2.4 MG/DL (ref 2.5–4.9)
PLATELET # BLD AUTO: 150 K/UL (ref 135–420)
PMV BLD AUTO: 11.8 FL (ref 9.2–11.8)
POTASSIUM SERPL-SCNC: 3 MMOL/L (ref 3.5–5.5)
PROT SERPL-MCNC: 6.1 G/DL (ref 6.4–8.2)
RBC # BLD AUTO: 3.56 M/UL (ref 4.7–5.5)
SERVICE CMNT-IMP: NORMAL
SERVICE CMNT-IMP: NORMAL
SODIUM SERPL-SCNC: 137 MMOL/L (ref 136–145)
SOURCE, COVRS: NORMAL
WBC # BLD AUTO: 13.1 K/UL (ref 4.6–13.2)

## 2020-07-17 PROCEDURE — 74011250636 HC RX REV CODE- 250/636: Performed by: INTERNAL MEDICINE

## 2020-07-17 PROCEDURE — 74011000250 HC RX REV CODE- 250: Performed by: NURSE ANESTHETIST, CERTIFIED REGISTERED

## 2020-07-17 PROCEDURE — 84100 ASSAY OF PHOSPHORUS: CPT

## 2020-07-17 PROCEDURE — 77030008771 HC TU NG SALEM SUMP -A

## 2020-07-17 PROCEDURE — 88305 TISSUE EXAM BY PATHOLOGIST: CPT

## 2020-07-17 PROCEDURE — 77030008683 HC TU ET CUF COVD -A: Performed by: ANESTHESIOLOGY

## 2020-07-17 PROCEDURE — 77030033827 HC SLV COMPR SCD THGH COVD -B: Performed by: SURGERY

## 2020-07-17 PROCEDURE — 07BB0ZX EXCISION OF MESENTERIC LYMPHATIC, OPEN APPROACH, DIAGNOSTIC: ICD-10-PCS | Performed by: SURGERY

## 2020-07-17 PROCEDURE — 80076 HEPATIC FUNCTION PANEL: CPT

## 2020-07-17 PROCEDURE — 74011250636 HC RX REV CODE- 250/636: Performed by: NURSE ANESTHETIST, CERTIFIED REGISTERED

## 2020-07-17 PROCEDURE — 0D9670Z DRAINAGE OF STOMACH WITH DRAINAGE DEVICE, VIA NATURAL OR ARTIFICIAL OPENING: ICD-10-PCS | Performed by: SURGERY

## 2020-07-17 PROCEDURE — 74018 RADEX ABDOMEN 1 VIEW: CPT

## 2020-07-17 PROCEDURE — 77030018842 HC SOL IRR SOD CL 9% BAXT -A: Performed by: SURGERY

## 2020-07-17 PROCEDURE — 77030021352 HC CBL LD SYS DISP COVD -B

## 2020-07-17 PROCEDURE — 77030008477 HC STYL SATN SLP COVD -A: Performed by: ANESTHESIOLOGY

## 2020-07-17 PROCEDURE — 74011000258 HC RX REV CODE- 258: Performed by: INTERNAL MEDICINE

## 2020-07-17 PROCEDURE — 82962 GLUCOSE BLOOD TEST: CPT

## 2020-07-17 PROCEDURE — C9113 INJ PANTOPRAZOLE SODIUM, VIA: HCPCS | Performed by: INTERNAL MEDICINE

## 2020-07-17 PROCEDURE — 36415 COLL VENOUS BLD VENIPUNCTURE: CPT

## 2020-07-17 PROCEDURE — 85025 COMPLETE CBC W/AUTO DIFF WBC: CPT

## 2020-07-17 PROCEDURE — 74011636637 HC RX REV CODE- 636/637: Performed by: INTERNAL MEDICINE

## 2020-07-17 PROCEDURE — 87635 SARS-COV-2 COVID-19 AMP PRB: CPT

## 2020-07-17 PROCEDURE — 74011250637 HC RX REV CODE- 250/637: Performed by: INTERNAL MEDICINE

## 2020-07-17 PROCEDURE — 80048 BASIC METABOLIC PNL TOTAL CA: CPT

## 2020-07-17 PROCEDURE — 77030018723 HC ELCTRD BLD COVD -A: Performed by: SURGERY

## 2020-07-17 PROCEDURE — 77030018846 HC SOL IRR STRL H20 ICUM -A: Performed by: SURGERY

## 2020-07-17 PROCEDURE — 77030011278 HC ELECTRD LIG IMPT COVD -F: Performed by: SURGERY

## 2020-07-17 PROCEDURE — 77030031139 HC SUT VCRL2 J&J -A: Performed by: SURGERY

## 2020-07-17 PROCEDURE — 76210000017 HC OR PH I REC 1.5 TO 2 HR: Performed by: SURGERY

## 2020-07-17 PROCEDURE — 74011000250 HC RX REV CODE- 250: Performed by: INTERNAL MEDICINE

## 2020-07-17 PROCEDURE — 77030002966 HC SUT PDS J&J -A: Performed by: SURGERY

## 2020-07-17 PROCEDURE — 83735 ASSAY OF MAGNESIUM: CPT

## 2020-07-17 PROCEDURE — 74011000272 HC RX REV CODE- 272: Performed by: SURGERY

## 2020-07-17 PROCEDURE — 77030021678 HC GLIDESCP STAT DISP VERT -B: Performed by: ANESTHESIOLOGY

## 2020-07-17 PROCEDURE — 76060000032 HC ANESTHESIA 0.5 TO 1 HR: Performed by: SURGERY

## 2020-07-17 PROCEDURE — 77030018846 HC SOL IRR STRL H20 ICUM -A

## 2020-07-17 PROCEDURE — 74011250637 HC RX REV CODE- 250/637: Performed by: HOSPITALIST

## 2020-07-17 PROCEDURE — 74011250636 HC RX REV CODE- 250/636: Performed by: SURGERY

## 2020-07-17 PROCEDURE — 0D988ZZ DRAINAGE OF SMALL INTESTINE, VIA NATURAL OR ARTIFICIAL OPENING ENDOSCOPIC: ICD-10-PCS | Performed by: SURGERY

## 2020-07-17 PROCEDURE — 65270000029 HC RM PRIVATE

## 2020-07-17 PROCEDURE — 77030011267 HC ELECTRD BLD COVD -A: Performed by: SURGERY

## 2020-07-17 PROCEDURE — 77030008462 HC STPLR SKN PROX J&J -A: Performed by: SURGERY

## 2020-07-17 PROCEDURE — 76010000138 HC OR TIME 0.5 TO 1 HR: Performed by: SURGERY

## 2020-07-17 RX ORDER — NALOXONE HYDROCHLORIDE 0.4 MG/ML
0.2 INJECTION, SOLUTION INTRAMUSCULAR; INTRAVENOUS; SUBCUTANEOUS AS NEEDED
Status: DISCONTINUED | OUTPATIENT
Start: 2020-07-17 | End: 2020-07-17 | Stop reason: HOSPADM

## 2020-07-17 RX ORDER — VECURONIUM BROMIDE FOR INJECTION 1 MG/ML
INJECTION, POWDER, LYOPHILIZED, FOR SOLUTION INTRAVENOUS AS NEEDED
Status: DISCONTINUED | OUTPATIENT
Start: 2020-07-17 | End: 2020-07-17 | Stop reason: HOSPADM

## 2020-07-17 RX ORDER — GLYCOPYRROLATE 0.2 MG/ML
INJECTION INTRAMUSCULAR; INTRAVENOUS AS NEEDED
Status: DISCONTINUED | OUTPATIENT
Start: 2020-07-17 | End: 2020-07-17 | Stop reason: HOSPADM

## 2020-07-17 RX ORDER — NEOSTIGMINE METHYLSULFATE 1 MG/ML
INJECTION, SOLUTION INTRAVENOUS AS NEEDED
Status: DISCONTINUED | OUTPATIENT
Start: 2020-07-17 | End: 2020-07-17 | Stop reason: HOSPADM

## 2020-07-17 RX ORDER — MIDAZOLAM HYDROCHLORIDE 1 MG/ML
INJECTION, SOLUTION INTRAMUSCULAR; INTRAVENOUS AS NEEDED
Status: DISCONTINUED | OUTPATIENT
Start: 2020-07-17 | End: 2020-07-17 | Stop reason: HOSPADM

## 2020-07-17 RX ORDER — PROPOFOL 10 MG/ML
INJECTION, EMULSION INTRAVENOUS AS NEEDED
Status: DISCONTINUED | OUTPATIENT
Start: 2020-07-17 | End: 2020-07-17 | Stop reason: HOSPADM

## 2020-07-17 RX ORDER — POTASSIUM CHLORIDE 7.45 MG/ML
10 INJECTION INTRAVENOUS
Status: COMPLETED | OUTPATIENT
Start: 2020-07-17 | End: 2020-07-17

## 2020-07-17 RX ORDER — DEXAMETHASONE SODIUM PHOSPHATE 4 MG/ML
INJECTION, SOLUTION INTRA-ARTICULAR; INTRALESIONAL; INTRAMUSCULAR; INTRAVENOUS; SOFT TISSUE AS NEEDED
Status: DISCONTINUED | OUTPATIENT
Start: 2020-07-17 | End: 2020-07-17 | Stop reason: HOSPADM

## 2020-07-17 RX ORDER — SODIUM CHLORIDE, SODIUM LACTATE, POTASSIUM CHLORIDE, CALCIUM CHLORIDE 600; 310; 30; 20 MG/100ML; MG/100ML; MG/100ML; MG/100ML
INJECTION, SOLUTION INTRAVENOUS
Status: DISCONTINUED | OUTPATIENT
Start: 2020-07-17 | End: 2020-07-17 | Stop reason: HOSPADM

## 2020-07-17 RX ORDER — METOPROLOL TARTRATE 5 MG/5ML
2.5 INJECTION INTRAVENOUS EVERY 6 HOURS
Status: DISCONTINUED | OUTPATIENT
Start: 2020-07-17 | End: 2020-07-18

## 2020-07-17 RX ORDER — FENTANYL CITRATE 50 UG/ML
INJECTION, SOLUTION INTRAMUSCULAR; INTRAVENOUS AS NEEDED
Status: DISCONTINUED | OUTPATIENT
Start: 2020-07-17 | End: 2020-07-17 | Stop reason: HOSPADM

## 2020-07-17 RX ORDER — HYDROMORPHONE HYDROCHLORIDE 1 MG/ML
1 INJECTION, SOLUTION INTRAMUSCULAR; INTRAVENOUS; SUBCUTANEOUS
Status: DISCONTINUED | OUTPATIENT
Start: 2020-07-17 | End: 2020-07-21 | Stop reason: HOSPADM

## 2020-07-17 RX ORDER — FENTANYL CITRATE 50 UG/ML
25 INJECTION, SOLUTION INTRAMUSCULAR; INTRAVENOUS
Status: DISCONTINUED | OUTPATIENT
Start: 2020-07-17 | End: 2020-07-17 | Stop reason: HOSPADM

## 2020-07-17 RX ORDER — ONDANSETRON 2 MG/ML
4 INJECTION INTRAMUSCULAR; INTRAVENOUS
Status: DISCONTINUED | OUTPATIENT
Start: 2020-07-17 | End: 2020-07-17 | Stop reason: HOSPADM

## 2020-07-17 RX ORDER — SODIUM CHLORIDE, SODIUM LACTATE, POTASSIUM CHLORIDE, CALCIUM CHLORIDE 600; 310; 30; 20 MG/100ML; MG/100ML; MG/100ML; MG/100ML
100 INJECTION, SOLUTION INTRAVENOUS CONTINUOUS
Status: DISCONTINUED | OUTPATIENT
Start: 2020-07-17 | End: 2020-07-17 | Stop reason: HOSPADM

## 2020-07-17 RX ORDER — SUCCINYLCHOLINE CHLORIDE 100 MG/5ML
SYRINGE (ML) INTRAVENOUS AS NEEDED
Status: DISCONTINUED | OUTPATIENT
Start: 2020-07-17 | End: 2020-07-17 | Stop reason: HOSPADM

## 2020-07-17 RX ORDER — FENTANYL CITRATE 50 UG/ML
50 INJECTION, SOLUTION INTRAMUSCULAR; INTRAVENOUS
Status: DISCONTINUED | OUTPATIENT
Start: 2020-07-17 | End: 2020-07-17 | Stop reason: HOSPADM

## 2020-07-17 RX ORDER — ONDANSETRON 2 MG/ML
INJECTION INTRAMUSCULAR; INTRAVENOUS AS NEEDED
Status: DISCONTINUED | OUTPATIENT
Start: 2020-07-17 | End: 2020-07-17 | Stop reason: HOSPADM

## 2020-07-17 RX ORDER — LIDOCAINE HYDROCHLORIDE 20 MG/ML
INJECTION, SOLUTION EPIDURAL; INFILTRATION; INTRACAUDAL; PERINEURAL AS NEEDED
Status: DISCONTINUED | OUTPATIENT
Start: 2020-07-17 | End: 2020-07-17 | Stop reason: HOSPADM

## 2020-07-17 RX ADMIN — Medication 100 MG: at 17:56

## 2020-07-17 RX ADMIN — INSULIN LISPRO 2 UNITS: 100 INJECTION, SOLUTION INTRAVENOUS; SUBCUTANEOUS at 09:38

## 2020-07-17 RX ADMIN — BENZOCAINE AND MENTHOL 1 LOZENGE: 15; 3.6 LOZENGE ORAL at 00:02

## 2020-07-17 RX ADMIN — DEXTROSE MONOHYDRATE AND POTASSIUM CHLORIDE: 5; .149 INJECTION, SOLUTION INTRAVENOUS at 07:49

## 2020-07-17 RX ADMIN — Medication 3 MG: at 18:29

## 2020-07-17 RX ADMIN — POTASSIUM CHLORIDE 10 MEQ: 10 INJECTION, SOLUTION INTRAVENOUS at 12:50

## 2020-07-17 RX ADMIN — PIPERACILLIN AND TAZOBACTAM 3.38 G: 3; .375 INJECTION, POWDER, LYOPHILIZED, FOR SOLUTION INTRAVENOUS at 07:51

## 2020-07-17 RX ADMIN — SODIUM CHLORIDE, SODIUM LACTATE, POTASSIUM CHLORIDE, AND CALCIUM CHLORIDE: 600; 310; 30; 20 INJECTION, SOLUTION INTRAVENOUS at 17:51

## 2020-07-17 RX ADMIN — ACETAMINOPHEN 650 MG: 325 TABLET, FILM COATED ORAL at 01:40

## 2020-07-17 RX ADMIN — PIPERACILLIN AND TAZOBACTAM 3.38 G: 3; .375 INJECTION, POWDER, LYOPHILIZED, FOR SOLUTION INTRAVENOUS at 15:29

## 2020-07-17 RX ADMIN — FENTANYL CITRATE 50 MCG: 50 INJECTION, SOLUTION INTRAMUSCULAR; INTRAVENOUS at 18:29

## 2020-07-17 RX ADMIN — FENTANYL CITRATE 50 MCG: 50 INJECTION, SOLUTION INTRAMUSCULAR; INTRAVENOUS at 17:56

## 2020-07-17 RX ADMIN — LIDOCAINE HYDROCHLORIDE 60 MG: 20 INJECTION, SOLUTION INTRAVENOUS at 17:56

## 2020-07-17 RX ADMIN — POTASSIUM CHLORIDE 10 MEQ: 10 INJECTION, SOLUTION INTRAVENOUS at 14:07

## 2020-07-17 RX ADMIN — ONDANSETRON 4 MG: 2 INJECTION INTRAMUSCULAR; INTRAVENOUS at 03:02

## 2020-07-17 RX ADMIN — PIPERACILLIN AND TAZOBACTAM 3.38 G: 3; .375 INJECTION, POWDER, LYOPHILIZED, FOR SOLUTION INTRAVENOUS at 00:02

## 2020-07-17 RX ADMIN — HYDROMORPHONE HYDROCHLORIDE 1 MG: 1 INJECTION, SOLUTION INTRAMUSCULAR; INTRAVENOUS; SUBCUTANEOUS at 21:00

## 2020-07-17 RX ADMIN — FENTANYL CITRATE 50 MCG: 50 INJECTION, SOLUTION INTRAMUSCULAR; INTRAVENOUS at 20:10

## 2020-07-17 RX ADMIN — INSULIN LISPRO 2 UNITS: 100 INJECTION, SOLUTION INTRAVENOUS; SUBCUTANEOUS at 12:54

## 2020-07-17 RX ADMIN — SODIUM CHLORIDE, SODIUM LACTATE, POTASSIUM CHLORIDE, AND CALCIUM CHLORIDE: 600; 310; 30; 20 INJECTION, SOLUTION INTRAVENOUS at 18:06

## 2020-07-17 RX ADMIN — PROPOFOL 50 MG: 10 INJECTION, EMULSION INTRAVENOUS at 18:31

## 2020-07-17 RX ADMIN — MELATONIN 12 MG: at 01:41

## 2020-07-17 RX ADMIN — GLYCOPYRROLATE 0.4 MG: 0.2 INJECTION INTRAMUSCULAR; INTRAVENOUS at 18:29

## 2020-07-17 RX ADMIN — METOPROLOL TARTRATE 2.5 MG: 5 INJECTION INTRAVENOUS at 12:52

## 2020-07-17 RX ADMIN — FENTANYL CITRATE 50 MCG: 50 INJECTION, SOLUTION INTRAMUSCULAR; INTRAVENOUS at 20:00

## 2020-07-17 RX ADMIN — FENTANYL CITRATE 50 MCG: 50 INJECTION, SOLUTION INTRAMUSCULAR; INTRAVENOUS at 19:05

## 2020-07-17 RX ADMIN — DEXAMETHASONE SODIUM PHOSPHATE 4 MG: 4 INJECTION, SOLUTION INTRA-ARTICULAR; INTRALESIONAL; INTRAMUSCULAR; INTRAVENOUS; SOFT TISSUE at 18:17

## 2020-07-17 RX ADMIN — INSULIN GLARGINE 8 UNITS: 100 INJECTION, SOLUTION SUBCUTANEOUS at 09:37

## 2020-07-17 RX ADMIN — PANTOPRAZOLE SODIUM 40 MG: 40 INJECTION, POWDER, FOR SOLUTION INTRAVENOUS at 14:00

## 2020-07-17 RX ADMIN — POTASSIUM CHLORIDE 10 MEQ: 10 INJECTION, SOLUTION INTRAVENOUS at 13:00

## 2020-07-17 RX ADMIN — MIDAZOLAM 2 MG: 1 INJECTION INTRAMUSCULAR; INTRAVENOUS at 17:53

## 2020-07-17 RX ADMIN — VECURONIUM BROMIDE FOR INJECTION 2 MG: 1 INJECTION, POWDER, LYOPHILIZED, FOR SOLUTION INTRAVENOUS at 18:07

## 2020-07-17 RX ADMIN — PROPOFOL 100 MG: 10 INJECTION, EMULSION INTRAVENOUS at 17:56

## 2020-07-17 RX ADMIN — FENTANYL CITRATE 50 MCG: 50 INJECTION, SOLUTION INTRAMUSCULAR; INTRAVENOUS at 18:55

## 2020-07-17 RX ADMIN — POTASSIUM CHLORIDE 10 MEQ: 10 INJECTION, SOLUTION INTRAVENOUS at 10:29

## 2020-07-17 RX ADMIN — DEXTROSE MONOHYDRATE AND POTASSIUM CHLORIDE: 5; .149 INJECTION, SOLUTION INTRAVENOUS at 23:00

## 2020-07-17 RX ADMIN — HYDRALAZINE HYDROCHLORIDE 10 MG: 20 INJECTION INTRAMUSCULAR; INTRAVENOUS at 19:35

## 2020-07-17 RX ADMIN — ONDANSETRON 4 MG: 2 SOLUTION INTRAMUSCULAR; INTRAVENOUS at 18:17

## 2020-07-17 NOTE — ANESTHESIA PREPROCEDURE EVALUATION
Relevant Problems   No relevant active problems       Anesthetic History   No history of anesthetic complications            Review of Systems / Medical History  Patient summary reviewed and pertinent labs reviewed    Pulmonary          Pneumonia      Comments: Recent/current RML pneumonia - patient states resp symptoms improved with Abx as abdominal Sx worsened   Neuro/Psych   Within defined limits           Cardiovascular    Hypertension          CAD         GI/Hepatic/Renal                Endo/Other    Diabetes: type 2, using insulin    Arthritis     Other Findings   Comments: Hypokalemia - stable last few days around 3.0 despite efforts to replenish         Physical Exam    Airway  Mallampati: II  TM Distance: 4 - 6 cm  Neck ROM: normal range of motion   Mouth opening: Normal     Cardiovascular  Regular rate and rhythm,  S1 and S2 normal,  no murmur, click, rub, or gallop             Dental      Comments: Edentulous upper, multiple missing lower, no remaining teeth appear loose   Pulmonary      Decreased breath sounds: right           Abdominal    Distended    Comments: NGT in place - draining green fluid Other Findings            Anesthetic Plan    ASA: 3, emergent  Anesthesia type: general          Induction: Intravenous  Anesthetic plan and risks discussed with: Patient

## 2020-07-17 NOTE — ROUTINE PROCESS
TRANSFER - IN REPORT:    Verbal report received from Alexander Daurte RN (name) [facility]  [facility](unit) for routine progression of care      Report consisted of patients Situation, Background, Assessment and   Recommendations(SBAR). Information from the following report(s) SBAR was reviewed with the receiving nurse. Opportunity for questions and clarification was provided. Assessment completed upon patients arrival to unit and care assumed.

## 2020-07-17 NOTE — CDMP QUERY
Pt admitted with SBO, Dietician has assessed and documented that this pt has Moderate Malnutrition, after review, if you concur, please document in your progress notes to reflect this diagnosis.  Malnutrition (mild, moderate, severe)    Protein calorie malnutrition (mild, moderate, severe)    Other (please specify)   Unable to determine    The medical record reflects the following:    Risk Factors:  Age, Sbo,       Clinical Indicators  Moderate malnutrition    Context:  Acute illness     Findings of the 6 clinical characteristics of malnutrition:   Energy Intake:  1 - 75% or less of est energy req for 7 or more days  Weight Loss:  1 - 1% to 2% over 1 week(Per weight Hx: 6 lb or 3% loss x 2 weeks.)      No muscle mass loss, no significant body fat loss,       Treatment:  Trial advancing to clear liquid diet if able   2.  Initiate oral nutrition supplements: Ensure Clear TID to supplement energy needs    Thank Eusebia Leon RN/CDI  [phone]

## 2020-07-17 NOTE — PROGRESS NOTES
Comprehensive Nutrition Assessment    Type and Reason for Visit: Reassess    Nutrition Recommendations/Plan:    1. Trial advancing to clear liquid diet if able   2. Initiate oral nutrition supplements: Ensure Clear TID to supplement energy needs  3. Monitor labs, weight and PO intake/tolerance  4. Consider alternate nutrition source if unable to advance diet or is not able to tolerate PO    Nutrition Assessment:  Patient presenting with SBO and currently NPO, but seen in room this morning stating he is doing well. Reports no GI discomfort, is hungry and wants to eat. General surgery following: plan to continue NPO with IVF pending ABD KUB results. Malnutrition Assessment:  Malnutrition Status: Moderate malnutrition    Context:  Acute illness     Findings of the 6 clinical characteristics of malnutrition:   Energy Intake:  1 - 75% or less of est energy req for 7 or more days  Weight Loss:  1 - 1% to 2% over 1 week(Per weight Hx: 6 lb or 3% loss x 2 weeks.)     Body Fat Loss:  No significant body fat loss,     Muscle Mass Loss:  No significant muscle mass loss,    Fluid Accumulation:  No significant fluid accumulation,     Strength:  Not performed     Nutrition History/Allergies: No beef or pork. Requires soft texture (soft solid-chopped) d/t dentures. Poor PO take x 3 days PTA d/t N/V. Per documented weight Hx total weight loss ~20 lb or 10% since Oct 2019. Estimated Daily Nutrient Needs:  Energy (kcal):  1284-1687  Protein (g):  79-95       Fluid (ml/day):  1 mL/kcal    Nutrition Related Findings:  No N/V/ABD Pain and is passing flatus with last BM overnight.  Poor dentition: has both upper and lower dentures with him but he states they do not fit      Wounds:    None       Additional Calorie Sources: IVF of D5W with KCL 20 mEq/L at 100 mL/hr (408 kcal/day)    Current Nutrition Therapies:   DIET NPO    Anthropometric Measures:  · Height:  5' 10\" (177.8 cm)  · Current Body Wt:  78.9 kg (173 lb 15.1 oz) · Admission Body Wt:  179 lb 14.3 oz    · Usual Body Wt:  192 lb(recently until Dec 2019)     · Ideal Body Wt:  166:  104.8 %   · BMI Categories:  Normal weight (BMI 22.0-24.9) age over 72       Nutrition Diagnosis:   · Inadequate oral intake, Moderate malnutrition, In context of acute illness or injury related to altered GI function as evidenced by poor intake prior to admission, NPO or clear liquid status due to medical condition, weight loss 1-2% in 1 week    · Biting/chewing (masticatory) difficulty related to partial or complete edentulism as evidenced by (poor fitting upper and lower dentures per patient.)      Nutrition Interventions:   Food and/or Nutrient Delivery: Start oral diet with oral nutrition supplement(per tolerance) or start parenteral nutrition   Nutrition Education and Counseling: No recommendations at this time  Coordination of Nutrition Care: Continued inpatient monitoring    Goals:  Nutritional needs will be met through adequate oral intake or nutrition support within the next 3 days. Nutrition Monitoring and Evaluation:   Food/Nutrient Intake Outcomes: Diet advancement/tolerance, IVF intake  Physical Signs/Symptoms Outcomes: Biochemical data, GI status, Weight, Chewing or swallowing    Discharge Planning:     Too soon to determine     Electronically signed by Fly Phillips on 7/16/2020 at 8:20 PM    Pager: [phone]

## 2020-07-17 NOTE — BRIEF OP NOTE
Brief Postoperative Note    Patient: Darcy Alberts  YOB: 1955  MRN: [id number]    Date of Procedure: 7/17/2020     Pre-Op Diagnosis: Generalized abdominal pain [R10.84]    Post-Op Diagnosis: Diffuse mesenteric lymphadenopathy. Small bowel obstruction with volvulus like effect secondary to mesenteric adhesions and bands. Procedure(s):  LAPAROTOMY EXPLORATORY. Reduction of small bowel volvulus. Mesenteric LN biopsy. Surgeon(s):  Sd Wright MD    Surgical Assistant: None    Anesthesia: General     Estimated Blood Loss (mL): Minimal    Complications: None    Specimens:   ID Type Source Tests Collected by Time Destination   1 : mesenteric lymph node Preservative   Sd Wright MD 7/17/2020 1822 Pathology        Implants: * No implants in log *    Drains:   Nasogastric Tube 07/17/20 (Active)   Site Assessment Clean, dry, & intact 07/17/20 0640   Securement Device Tape 07/17/20 0640   G Port Status Intermittent Suction 07/17/20 0640   External Insertion Jose Luis (cms) 70 cms 07/17/20 0640   Action Taken Other (comment) 07/17/20 0640   Drainage Description Green 07/17/20 0640   Drainage Chamber Level (ml) 180 ml 07/17/20 0640   Output (ml) 180 ml 07/17/20 0640       Findings:  Diffuse mesenteric lymphadenopathy. Small bowel obstruction with volvulus like effect secondary to mesenteric adhesions and bands.     Electronically Signed by Wing Kayden MD on 7/17/2020 at 6:40 PM

## 2020-07-17 NOTE — ROUTINE PROCESS
0730-- Bedside, Verbal and Written shift change report received by Chip Harvey (oncoming nurse) by Sr. Pathak(offgoing nurse). Allergy band placed on pt's wrist. Report included the following information SBAR, Kardex, Intake/Output, MAR and Recent Results. 0800-Assessment completed. 0900-taken to radiology. 1000 -- AM  medications administered, pt tolerated with ease, will continue to monitor. 1200 -- Shift reassessment, pt condition unchanged, will continue to monitor. 1600--  Shift reassessment, pt condition unchanged, will continue to monitor. 1730-had clear liquid dinner and tolerated well, denies any nausea/vomiting. 1950-- Bedside, Verbal and Written shift change report given to Angella(oncoming nurse) by Chip Harevy (offgoing nurse). Report included the following information SBAR, Kardex, Intake/Output, MAR and Recent Results. Skin assessment completed.

## 2020-07-17 NOTE — PROGRESS NOTES
Internal Medicine Progress Note        NAME: Obdulio Ware   :  1955  MRM:  [id number]    Date/Time: 2020        ASSESSMENT/PLAN:  I agree with urgent surgery need when slot for OR available for him. Pt experiencing pains , abd more tense now and tender. Had N/V. Appreciate surgery input. # SBO. Appreciate surgery consult, dw surgery  repeat CT abd/pelvic finding also serial KUB monitoered. Keep npo except meds. IVF. Mobilization. Maintain K/lytes level           # RML pneumonia. Follow CXR. Maintain O2.     - culture of blood NTD  - spiked of temp. Started  on iv zosyn. No further spikes. Nurses instructed to check BC if spiked 101 or more. # Hypokalemia. Replete and trend     # stage 3 PRADEEP/CKD3 . Improving  with hydration. Continue to improve. Continue hydration. Monitor I/O    Monitor Renal function and other labs as indicated. Avoid nephrotoxins , iv Contrast, NSAID. Renally dosing medications. Monitor urine out put. # DM. Provide SSI, hypoglycemia protocol and frequent Accu checks. Provide SSI, hypoglycemia protocol and frequent Accu checks. Education,  diabetic educator  . Diabetic Diet     # TCP. Improved. # Nausea not controlled by zofran. Added phenergan     # Mild hypernatremia. Changed ivf to D5 Kcl.   Improved      # Moderate malnutrition    OF Acute illness    # PT        - Cont acceptable home medications for chronic conditions   - DVT protocol    IP CONSULT TO HOSPITALIST  IP CONSULT TO GENERAL SURGERY    Lab Review:     Recent Labs     20  0415 20  0345   WBC 13.1 8.5   HGB 11.3* 10.8*   HCT 33.2* 32.7*    136     Recent Labs     20  0415 20  0345 07/15/20  0315    145 151*   K 3.0* 2.9* 3.0*    111 117*   CO2 25 28 28   * 143* 91   BUN 21* 24* 28*   CREA 1.06 1.05 1.03   CA 7.5* 7.7* 7.8*   MG 1.6 1.8 1.9   PHOS 2.4* 2.7 2.2*   ALB 2.2* 2.0* 2.0*   TBILI 2.9* 2.0* 1.9*   ALT 41 41 44   INR  --  1.2  -- Lab Results   Component Value Date/Time    Glucose (POC) 170 (H) 07/17/2020 11:49 AM    Glucose (POC) 196 (H) 07/17/2020 07:32 AM    Glucose (POC) 192 (H) 07/16/2020 09:17 PM    Glucose (POC) 109 07/16/2020 03:59 PM    Glucose (POC) 155 (H) 07/16/2020 11:52 AM     No results for input(s): PH, PCO2, PO2, HCO3, FIO2 in the last 72 hours. Recent Labs     07/16/20  0345   INR 1.2       No results found for: SDES  Lab Results   Component Value Date/Time    Culture result: NO GROWTH 6 DAYS 07/11/2020 04:04 PM    Culture result: NO GROWTH 6 DAYS 07/11/2020 04:04 PM      All Cardiac Markers in the last 24 hours: No results found for: CPK, CK, CKMMB, CKMB, RCK3, CKMBT, CKNDX, CKND1, TRE, TROPT, TROIQ, YOUSUF, TROPT, TNIPOC, BNP, BNPP      Intervals noted   Pt s/e @ bedside     Subjective:     Chief Complaint:      abd pains  N. Agree on surgery       ROS:  (bold if positive,otherwise negative). Fever/chills ,  Dysuria   Cough , Sputum , SOB/HUNT , Chest Pain     Diarrhea ,Nausea/Vomit , Abd Pain , Constipation            Objective:     Vitals:  Last 24hrs VS reviewed since prior progress note. Most recent are:    Visit Vitals  /79 (BP 1 Location: Right arm, BP Patient Position: At rest)   Pulse 82   Temp 99.6 °F (37.6 °C)   Resp 16   Ht 5' 10\" (1.778 m)   Wt 78.9 kg (174 lb)   SpO2 97%   BMI 24.97 kg/m²     SpO2 Readings from Last 6 Encounters:   07/17/20 97%   06/30/20 98%   12/22/19 97%   07/16/19 96%   05/13/19 93%   04/16/19 98%            Intake/Output Summary (Last 24 hours) at 7/17/2020 1508  Last data filed at 7/17/2020 1151  Gross per 24 hour   Intake 2330 ml   Output 1905 ml   Net 425 ml          Physical Exam:   General Appearance: NAD, conversant  Lungs: CTA with normal respiratory effort  CV: RRR, no m/r/g  Abdomen: MORE TENSE TODAY , MILD DIFFUSE TENDERNESS. Reduced  bowel sounds  Extremities: no cyanosis, no peripheral edema  Neuro: No focal deficits, motor/sensory intact.      Medications Reviewed: (see below)    Lab Data Reviewed: (see below)    ______________________________________________________________________    Medications:     Current Facility-Administered Medications   Medication Dose Route Frequency    metoprolol (LOPRESSOR) injection 2.5 mg  2.5 mg IntraVENous Q6H    piperacillin-tazobactam (ZOSYN) 3.375 g in 0.9% sodium chloride (MBP/ADV) 100 mL MBP \"EXTENDED 4 HOUR INFUSION ###  3.375 g IntraVENous Q8H    benzocaine-menthoL (CEPACOL) lozenge 1 Lozenge  1 Lozenge Mucous Membrane Q2.5H PRN    insulin glargine (LANTUS) injection 8 Units  8 Units SubCUTAneous DAILY    dextrose 5% with KCl 20 mEq/L infusion   IntraVENous CONTINUOUS    pantoprazole (PROTONIX) injection 40 mg  40 mg IntraVENous Q24H    cloNIDine HCL (CATAPRES) tablet 0.1 mg  0.1 mg Oral BID    [Held by provider] NIFEdipine ER (PROCARDIA XL) tablet 90 mg  90 mg Oral DAILY    promethazine (PHENERGAN) injection 25 mg  25 mg IntraMUSCular Q6H PRN    hydrALAZINE (APRESOLINE) 20 mg/mL injection 10 mg  10 mg IntraVENous Q6H PRN    ondansetron (ZOFRAN) injection 4 mg  4 mg IntraVENous Q4H PRN    tamsulosin (FLOMAX) capsule 0.4 mg  0.4 mg Oral DAILY    latanoprost (XALATAN) 0.005 % ophthalmic solution 1 Drop  1 Drop Left Eye QHS    [Held by provider] metoprolol tartrate (LOPRESSOR) tablet 50 mg  50 mg Oral BID    [Held by provider] amLODIPine (NORVASC) tablet 10 mg  10 mg Oral DAILY    insulin lispro (HUMALOG) injection   SubCUTAneous AC&HS    glucose chewable tablet 16 g  4 Tab Oral PRN    glucagon (GLUCAGEN) injection 1 mg  1 mg IntraMUSCular PRN    dextrose 10% infusion 125-250 mL  125-250 mL IntraVENous PRN    docusate sodium (COLACE) capsule 100 mg  100 mg Oral BID PRN    melatonin tablet 12 mg  12 mg Oral QHS PRN    albuterol-ipratropium (DUO-NEB) 2.5 MG-0.5 MG/3 ML  3 mL Nebulization Q4H PRN    acetaminophen (TYLENOL) tablet 650 mg  650 mg Oral Q6H PRN      Total time spent with patient: 35 minutes. Care Plan discussed with: Patient, Care Manager, Nursing Staff and >50% of time spent in counseling and coordination of care DW surgery     Discussed:  Care Plan    Prophylaxis:  SCD's    Disposition:  Home w/Family           This document in whole or part of it has been produced using voice recognition software. Unrecognized errors in transcription may be present.     Attending Physician: Stephen Roach MD

## 2020-07-17 NOTE — ANESTHESIA POSTPROCEDURE EVALUATION
Procedure(s):  LAPAROTOMY EXPLORATORY. Reduction of small bowel volvulus. Mesenteric LN biopsy. Victor M Antu general    Anesthesia Post Evaluation      Multimodal analgesia: multimodal analgesia used between 6 hours prior to anesthesia start to PACU discharge  Patient location during evaluation: PACU  Patient participation: complete - patient participated  Level of consciousness: awake and alert  Pain management: adequate  Airway patency: patent  Anesthetic complications: no  Cardiovascular status: acceptable  Respiratory status: acceptable  Hydration status: acceptable  Post anesthesia nausea and vomiting:  controlled  Final Post Anesthesia Temperature Assessment:  Normothermia (36.0-37.5 degrees C)      INITIAL Post-op Vital signs:   Vitals Value Taken Time   /85 7/17/2020  7:05 PM   Temp 36.4 °C (97.6 °F) 7/17/2020  6:50 PM   Pulse 90 7/17/2020  7:11 PM   Resp 17 7/17/2020  7:11 PM   SpO2 97 % 7/17/2020  7:11 PM   Vitals shown include unvalidated device data.

## 2020-07-17 NOTE — PROGRESS NOTES
Discharge/Transition Planning  Problem: Discharge Planning  Goal: *Discharge to safe environment  Outcome: Progressing Towards Goal     PLAN : Home    Care Management following and chart reviewed. Possible exploratory lap later today.  Plan remains home at this time and will continue to follow      Fozia Garay RN BSN  Outcomes Manager  Office # [phone]  Pager # [phone]

## 2020-07-17 NOTE — PROGRESS NOTES
Problem: Diabetes Self-Management  Goal: *Disease process and treatment process  Description: Define diabetes and identify own type of diabetes; list 3 options for treating diabetes. Outcome: Progressing Towards Goal  Goal: *Incorporating nutritional management into lifestyle  Description: Describe effect of type, amount and timing of food on blood glucose; list 3 methods for planning meals. Outcome: Progressing Towards Goal  Goal: *Incorporating physical activity into lifestyle  Description: State effect of exercise on blood glucose levels. Outcome: Progressing Towards Goal  Goal: *Developing strategies to promote health/change behavior  Description: Define the ABC's of diabetes; identify appropriate screenings, schedule and personal plan for screenings. Outcome: Progressing Towards Goal  Goal: *Using medications safely  Description: State effect of diabetes medications on diabetes; name diabetes medication taking, action and side effects. Outcome: Progressing Towards Goal  Goal: *Monitoring blood glucose, interpreting and using results  Description: Identify recommended blood glucose targets  and personal targets. Outcome: Progressing Towards Goal  Goal: *Prevention, detection, treatment of acute complications  Description: List symptoms of hyper- and hypoglycemia; describe how to treat low blood sugar and actions for lowering  high blood glucose level. Outcome: Progressing Towards Goal  Goal: *Prevention, detection and treatment of chronic complications  Description: Define the natural course of diabetes and describe the relationship of blood glucose levels to long term complications of diabetes.   Outcome: Progressing Towards Goal  Goal: *Developing strategies to address psychosocial issues  Description: Describe feelings about living with diabetes; identify support needed and support network  Outcome: Progressing Towards Goal  Goal: *Insulin pump training  Outcome: Progressing Towards Goal  Goal: *Sick day guidelines  Outcome: Progressing Towards Goal  Goal: *Patient Specific Goal (EDIT GOAL, INSERT TEXT)  Outcome: Progressing Towards Goal     Problem: Patient Education: Go to Patient Education Activity  Goal: Patient/Family Education  Outcome: Progressing Towards Goal     Problem: Falls - Risk of  Goal: *Absence of Falls  Description: Document Yumiko Rambo Fall Risk and appropriate interventions in the flowsheet. Outcome: Progressing Towards Goal  Note: Fall Risk Interventions:  Mobility Interventions: Communicate number of staff needed for ambulation/transfer, Patient to call before getting OOB         Medication Interventions: Evaluate medications/consider consulting pharmacy, Patient to call before getting OOB    Elimination Interventions: Call light in reach, Patient to call for help with toileting needs, Stay With Me (per policy), Urinal in reach              Problem: Patient Education: Go to Patient Education Activity  Goal: Patient/Family Education  Outcome: Progressing Towards Goal     Problem: Discharge Planning  Goal: *Discharge to safe environment  Outcome: Progressing Towards Goal     Problem: Pressure Injury - Risk of  Goal: *Prevention of pressure injury  Description: Document Rai Scale and appropriate interventions in the flowsheet.   Outcome: Progressing Towards Goal  Note: Pressure Injury Interventions:       Moisture Interventions: Absorbent underpads    Activity Interventions: Pressure redistribution bed/mattress(bed type)    Mobility Interventions: Pressure redistribution bed/mattress (bed type)    Nutrition Interventions: Document food/fluid/supplement intake                     Problem: Patient Education: Go to Patient Education Activity  Goal: Patient/Family Education  Outcome: Progressing Towards Goal     Problem: Nutrition Deficit  Goal: *Optimize nutritional status  Outcome: Progressing Towards Goal     Problem: Pain  Goal: *Control of Pain  Outcome: Progressing Towards Goal     Problem: Patient Education: Go to Patient Education Activity  Goal: Patient/Family Education  Outcome: Progressing Towards Goal

## 2020-07-17 NOTE — PROGRESS NOTES
Patient posted earlier for surgery for today. Waiting for the OR team to let me know at what time we can proceed with the surgery. I spoke with the medical team and we agreed on the plan. I spoke in details with the patient about the laparotomy and the risks of surgery including the need for bowel resection, leak, hernia, bleeding and other possible complications.

## 2020-07-17 NOTE — PROGRESS NOTES
General Surgery Consult      Leticia Plata  Admit date: 7/10/2020    MRN: [id number]     : 1955     Age: 75 y.o. Attending Physician: Sophronia Bosworth, MD, FACS      Subjective:     Leticia Plata is a 75 y.o. male who I am following for a picture of possible small bowel obstruction versus ileus. The patient has been doing relatively well for the last 3 days and he has been passing flatus and having bowel movement and had normal WBC and normal vitals however his KUB Showing picture of small bowel obstruction on his CT scan from 2 days ago showed also a picture of possible small bowel obstruction and his KUB from yesterday also showed persistent bowel dilatation. Overnight the patient did not do well and he had nausea and he vomited once. He also stating that he is having some abdominal pain. His vital signs are normal with no fever or tachycardia. His WBC has increased to 13,000.      Patient Active Problem List    Diagnosis Date Noted    Hyponatremia 2020    Hyperglycemia 2020    Stage 3 acute kidney injury ([facility] [address] 2020    Community acquired pneumonia of right middle lobe of lung 2020    Former smoker 2020    BPH (benign prostatic hyperplasia) 2020    Frequency of micturition     Incomplete bladder emptying     Urgency of micturition     Hypogonadism in male 2017    Nocturia 2017    Urge incontinence 2017    Effusion of knee 2016    Erectile dysfunction 2016    Enlarged heart     Coronary artery disease     Musculoskeletal pain     Wrist joint pain     Arthritis     Benign hypertension     Impotence     Malignant hypertension 2013    Chest pain 2013    Syncope 2013    Essential hypertension 2013    Type II diabetes mellitus ([address] 2013     Past Medical History:   Diagnosis Date    Arthritis     Benign hypertension     Coronary artery disease     Diabetes mellitus ([address]     Enlarged heart  Erectile dysfunction     Frequency of micturition     Hypogonadism in male     Impotence     Incomplete bladder emptying     Musculoskeletal pain     Nocturia     Urgency of micturition     Wrist joint pain       Past Surgical History:   Procedure Laterality Date    HX CARPAL TUNNEL RELEASE      HX HERNIA REPAIR        Social History     Tobacco Use    Smoking status: Former Smoker     Packs/day: 0.50     Years: 5.00     Pack years: 2.50     Types: Cigarettes     Last attempt to quit: 1976     Years since quittin.5    Smokeless tobacco: Never Used   Substance Use Topics    Alcohol use: Yes     Comment: occassionally      Social History     Tobacco Use   Smoking Status Former Smoker    Packs/day: 0.50    Years: 5.00    Pack years: 2.50    Types: Cigarettes    Last attempt to quit: 1976    Years since quittin.5   Smokeless Tobacco Never Used     Family History   Problem Relation Age of Onset    Diabetes Mother     Hypertension Mother     Heart Failure Mother     High Cholesterol Mother     Stroke Mother     Other Mother         vision problems    Diabetes Father     Hypertension Father     Diabetes Sister     HIV/AIDS Sister     Hypertension Sister     HIV/AIDS Sister     No Known Problems Brother     HIV/AIDS Sister     Diabetes Maternal Uncle     Hypertension Maternal Uncle       Current Facility-Administered Medications   Medication Dose Route Frequency    piperacillin-tazobactam (ZOSYN) 3.375 g in 0.9% sodium chloride (MBP/ADV) 100 mL MBP \"EXTENDED 4 HOUR INFUSION ###  3.375 g IntraVENous Q8H    benzocaine-menthoL (CEPACOL) lozenge 1 Lozenge  1 Lozenge Mucous Membrane Q2.5H PRN    insulin glargine (LANTUS) injection 8 Units  8 Units SubCUTAneous DAILY    dextrose 5% with KCl 20 mEq/L infusion   IntraVENous CONTINUOUS    pantoprazole (PROTONIX) injection 40 mg  40 mg IntraVENous Q24H    cloNIDine HCL (CATAPRES) tablet 0.1 mg  0.1 mg Oral BID    NIFEdipine ER (PROCARDIA XL) tablet 90 mg  90 mg Oral DAILY    polyethylene glycol (MIRALAX) packet 17 g  17 g Oral DAILY    promethazine (PHENERGAN) injection 25 mg  25 mg IntraMUSCular Q6H PRN    hydrALAZINE (APRESOLINE) 20 mg/mL injection 10 mg  10 mg IntraVENous Q6H PRN    ondansetron (ZOFRAN) injection 4 mg  4 mg IntraVENous Q4H PRN    tamsulosin (FLOMAX) capsule 0.4 mg  0.4 mg Oral DAILY    latanoprost (XALATAN) 0.005 % ophthalmic solution 1 Drop  1 Drop Left Eye QHS    metoprolol tartrate (LOPRESSOR) tablet 50 mg  50 mg Oral BID    amLODIPine (NORVASC) tablet 10 mg  10 mg Oral DAILY    insulin lispro (HUMALOG) injection   SubCUTAneous AC&HS    glucose chewable tablet 16 g  4 Tab Oral PRN    glucagon (GLUCAGEN) injection 1 mg  1 mg IntraMUSCular PRN    dextrose 10% infusion 125-250 mL  125-250 mL IntraVENous PRN    docusate sodium (COLACE) capsule 100 mg  100 mg Oral BID PRN    melatonin tablet 12 mg  12 mg Oral QHS PRN    albuterol-ipratropium (DUO-NEB) 2.5 MG-0.5 MG/3 ML  3 mL Nebulization Q4H PRN    acetaminophen (TYLENOL) tablet 650 mg  650 mg Oral Q6H PRN      Allergies   Allergen Reactions    Aspirin Anaphylaxis     Fatal    Lisinopril Cough        Review of Systems:  Constitutional: negative  Eyes: negative  Ears, Nose, Mouth, Throat, and Face: negative  Respiratory: negative  Cardiovascular: negative  Gastrointestinal: positive for nausea and abdominal pain  Genitourinary:negative  Integument/Breast: negative  Hematologic/Lymphatic: negative  Musculoskeletal:negative  Neurological: negative  Behavioral/Psychiatric: negative  Endocrine: negative  Allergic/Immunologic: negative      Objective:     Visit Vitals  /75 (BP 1 Location: Right arm, BP Patient Position: At rest)   Pulse 73   Temp 99.4 °F (37.4 °C)   Resp 16   Ht 5' 10\" (1.778 m)   Wt 78.9 kg (174 lb)   SpO2 95%   BMI 24.97 kg/m²       Physical Exam:      General:  in no apparent distress, alert, oriented times 3, afebrile, normal vitals and cooperative   Eyes:  conjunctivae and sclerae normal, pupils equal, round, reactive to light   Throat & Neck: no erythema or exudates noted and neck supple and symmetrical; no palpable masses   Lungs:   clear to auscultation bilaterally   Heart:  Regular rate and rhythm   Abdomen:   Rounded, soft, mild diffuse distention and tenderness but no guarding or rebound. No abdominal wall hernias. Extremities: extremities normal, atraumatic, no cyanosis or edema   Skin: Normal.         Imaging and Lab Review:     CBC:   Lab Results   Component Value Date/Time    WBC 13.1 07/17/2020 04:15 AM    RBC 3.56 (L) 07/17/2020 04:15 AM    HGB 11.3 (L) 07/17/2020 04:15 AM    HCT 33.2 (L) 07/17/2020 04:15 AM    PLATELET 753 24/84/4932 04:15 AM     BMP:   Lab Results   Component Value Date/Time    Glucose 147 (H) 07/17/2020 04:15 AM    Sodium 137 07/17/2020 04:15 AM    Potassium 3.0 (L) 07/17/2020 04:15 AM    Chloride 106 07/17/2020 04:15 AM    CO2 25 07/17/2020 04:15 AM    BUN 21 (H) 07/17/2020 04:15 AM    Creatinine 1.06 07/17/2020 04:15 AM    Calcium 7.5 (L) 07/17/2020 04:15 AM     CMP:  Lab Results   Component Value Date/Time    Glucose 147 (H) 07/17/2020 04:15 AM    Sodium 137 07/17/2020 04:15 AM    Potassium 3.0 (L) 07/17/2020 04:15 AM    Chloride 106 07/17/2020 04:15 AM    CO2 25 07/17/2020 04:15 AM    BUN 21 (H) 07/17/2020 04:15 AM    Creatinine 1.06 07/17/2020 04:15 AM    Calcium 7.5 (L) 07/17/2020 04:15 AM    Anion gap 6 07/17/2020 04:15 AM    BUN/Creatinine ratio 20 07/17/2020 04:15 AM    Alk.  phosphatase 74 07/17/2020 04:15 AM    Protein, total 6.1 (L) 07/17/2020 04:15 AM    Albumin 2.2 (L) 07/17/2020 04:15 AM    Globulin 3.9 07/17/2020 04:15 AM    A-G Ratio 0.6 (L) 07/17/2020 04:15 AM       Recent Results (from the past 24 hour(s))   GLUCOSE, POC    Collection Time: 07/16/20  7:53 AM   Result Value Ref Range    Glucose (POC) 138 (H) 70 - 110 mg/dL   GLUCOSE, POC    Collection Time: 07/16/20 11:52 AM   Result Value Ref Range    Glucose (POC) 155 (H) 70 - 110 mg/dL   GLUCOSE, POC    Collection Time: 07/16/20  3:59 PM   Result Value Ref Range    Glucose (POC) 109 70 - 110 mg/dL   GLUCOSE, POC    Collection Time: 07/16/20  9:17 PM   Result Value Ref Range    Glucose (POC) 192 (H) 70 - 040 mg/dL   METABOLIC PANEL, BASIC    Collection Time: 07/17/20  4:15 AM   Result Value Ref Range    Sodium 137 136 - 145 mmol/L    Potassium 3.0 (L) 3.5 - 5.5 mmol/L    Chloride 106 100 - 111 mmol/L    CO2 25 21 - 32 mmol/L    Anion gap 6 3.0 - 18 mmol/L    Glucose 147 (H) 74 - 99 mg/dL    BUN 21 (H) 7.0 - 18 MG/DL    Creatinine 1.06 0.6 - 1.3 MG/DL    BUN/Creatinine ratio 20 12 - 20      GFR est AA >60 >60 ml/min/1.73m2    GFR est non-AA >60 >60 ml/min/1.73m2    Calcium 7.5 (L) 8.5 - 10.1 MG/DL   CBC WITH AUTOMATED DIFF    Collection Time: 07/17/20  4:15 AM   Result Value Ref Range    WBC 13.1 4.6 - 13.2 K/uL    RBC 3.56 (L) 4.70 - 5.50 M/uL    HGB 11.3 (L) 13.0 - 16.0 g/dL    HCT 33.2 (L) 36.0 - 48.0 %    MCV 93.3 74.0 - 97.0 FL    MCH 31.7 24.0 - 34.0 PG    MCHC 34.0 31.0 - 37.0 g/dL    RDW 12.4 11.6 - 14.5 %    PLATELET 440 347 - 856 K/uL    MPV 11.8 9.2 - 11.8 FL    NEUTROPHILS 84 (H) 40 - 73 %    LYMPHOCYTES 9 (L) 21 - 52 %    MONOCYTES 6 3 - 10 %    EOSINOPHILS 1 0 - 5 %    BASOPHILS 0 0 - 2 %    ABS. NEUTROPHILS 11.0 (H) 1.8 - 8.0 K/UL    ABS. LYMPHOCYTES 1.2 0.9 - 3.6 K/UL    ABS. MONOCYTES 0.7 0.05 - 1.2 K/UL    ABS. EOSINOPHILS 0.1 0.0 - 0.4 K/UL    ABS. BASOPHILS 0.0 0.0 - 0.1 K/UL    DF AUTOMATED     HEPATIC FUNCTION PANEL    Collection Time: 07/17/20  4:15 AM   Result Value Ref Range    Protein, total 6.1 (L) 6.4 - 8.2 g/dL    Albumin 2.2 (L) 3.4 - 5.0 g/dL    Globulin 3.9 2.0 - 4.0 g/dL    A-G Ratio 0.6 (L) 0.8 - 1.7      Bilirubin, total 2.9 (H) 0.2 - 1.0 MG/DL    Bilirubin, direct 2.2 (H) 0.0 - 0.2 MG/DL    Alk.  phosphatase 74 45 - 117 U/L    AST (SGOT) 45 (H) 10 - 38 U/L    ALT (SGPT) 41 16 - 61 U/L PHOSPHORUS    Collection Time: 07/17/20  4:15 AM   Result Value Ref Range    Phosphorus 2.4 (L) 2.5 - 4.9 MG/DL   MAGNESIUM    Collection Time: 07/17/20  4:15 AM   Result Value Ref Range    Magnesium 1.6 1.6 - 2.6 mg/dL       images and reports reviewed    Assessment:   Leonard King is a 75 y.o. male who I am following for a picture of small bowel obstruction versus ileus. I think that it is clear now that the patient has a picture of small bowel obstruction, and this is for the first time since I have examined the patient that he has abdominal pain and nausea. He did not tolerate the clear liquid diet from yesterday and his abdomen is slightly distended. His WBC has also increased to 13,000. Though his vital signs are normal I believe that the patient needs semi-urgent exploration for his bowel obstruction. I asked the nurse to keep him n.p.o. and insert an NG tube. I will let him rest for a few hours and then reassess his situation and will order also a KUB. We will also hydrate the patient. I explained to him that I am planning to take him for surgery later today for exploratory laparotomy lysis of adhesion and possible bowel resection. The patient agreed on this plan. Plan:     N.p.o.  NG tube.   I asked the nurse to please insert the tube and place it on suction  KUB  And planning for exploratory laparotomy possible lysis of adhesion possible bowel resection later today  We will discuss the case with the primary team    Please call me if you have any questions (cell phone: [phone])     Signed By: Arron Luna MD     July 17, 2020

## 2020-07-18 ENCOUNTER — APPOINTMENT (OUTPATIENT)
Dept: GENERAL RADIOLOGY | Age: 65
DRG: 229 | End: 2020-07-18
Attending: SURGERY
Payer: MEDICAID

## 2020-07-18 LAB
ANION GAP SERPL CALC-SCNC: 7 MMOL/L (ref 3–18)
BASOPHILS # BLD: 0 K/UL (ref 0–0.1)
BASOPHILS NFR BLD: 0 % (ref 0–2)
BUN SERPL-MCNC: 11 MG/DL (ref 7–18)
BUN/CREAT SERPL: 13 (ref 12–20)
CALCIUM SERPL-MCNC: 7.5 MG/DL (ref 8.5–10.1)
CHLORIDE SERPL-SCNC: 108 MMOL/L (ref 100–111)
CO2 SERPL-SCNC: 23 MMOL/L (ref 21–32)
CREAT SERPL-MCNC: 0.84 MG/DL (ref 0.6–1.3)
DIFFERENTIAL METHOD BLD: ABNORMAL
EOSINOPHIL # BLD: 0 K/UL (ref 0–0.4)
EOSINOPHIL NFR BLD: 0 % (ref 0–5)
ERYTHROCYTE [DISTWIDTH] IN BLOOD BY AUTOMATED COUNT: 12.4 % (ref 11.6–14.5)
GLUCOSE BLD STRIP.AUTO-MCNC: 125 MG/DL (ref 70–110)
GLUCOSE BLD STRIP.AUTO-MCNC: 152 MG/DL (ref 70–110)
GLUCOSE BLD STRIP.AUTO-MCNC: 161 MG/DL (ref 70–110)
GLUCOSE SERPL-MCNC: 144 MG/DL (ref 74–99)
HCT VFR BLD AUTO: 35 % (ref 36–48)
HGB BLD-MCNC: 12.2 G/DL (ref 13–16)
LYMPHOCYTES # BLD: 0.8 K/UL (ref 0.9–3.6)
LYMPHOCYTES NFR BLD: 5 % (ref 21–52)
MCH RBC QN AUTO: 32.2 PG (ref 24–34)
MCHC RBC AUTO-ENTMCNC: 34.9 G/DL (ref 31–37)
MCV RBC AUTO: 92.3 FL (ref 74–97)
MONOCYTES # BLD: 0.6 K/UL (ref 0.05–1.2)
MONOCYTES NFR BLD: 4 % (ref 3–10)
NEUTS SEG # BLD: 15.6 K/UL (ref 1.8–8)
NEUTS SEG NFR BLD: 91 % (ref 40–73)
PLATELET # BLD AUTO: 166 K/UL (ref 135–420)
PMV BLD AUTO: 11.1 FL (ref 9.2–11.8)
POTASSIUM SERPL-SCNC: 3.4 MMOL/L (ref 3.5–5.5)
RBC # BLD AUTO: 3.79 M/UL (ref 4.7–5.5)
SODIUM SERPL-SCNC: 138 MMOL/L (ref 136–145)
WBC # BLD AUTO: 17 K/UL (ref 4.6–13.2)

## 2020-07-18 PROCEDURE — C9113 INJ PANTOPRAZOLE SODIUM, VIA: HCPCS | Performed by: INTERNAL MEDICINE

## 2020-07-18 PROCEDURE — 74011250637 HC RX REV CODE- 250/637: Performed by: INTERNAL MEDICINE

## 2020-07-18 PROCEDURE — 74011250636 HC RX REV CODE- 250/636: Performed by: INTERNAL MEDICINE

## 2020-07-18 PROCEDURE — 74011250636 HC RX REV CODE- 250/636: Performed by: SURGERY

## 2020-07-18 PROCEDURE — 74011000258 HC RX REV CODE- 258: Performed by: INTERNAL MEDICINE

## 2020-07-18 PROCEDURE — 85025 COMPLETE CBC W/AUTO DIFF WBC: CPT

## 2020-07-18 PROCEDURE — 80048 BASIC METABOLIC PNL TOTAL CA: CPT

## 2020-07-18 PROCEDURE — 74018 RADEX ABDOMEN 1 VIEW: CPT

## 2020-07-18 PROCEDURE — 74011000250 HC RX REV CODE- 250: Performed by: INTERNAL MEDICINE

## 2020-07-18 PROCEDURE — 77030018836 HC SOL IRR NACL ICUM -A

## 2020-07-18 PROCEDURE — 82962 GLUCOSE BLOOD TEST: CPT

## 2020-07-18 PROCEDURE — 74011636637 HC RX REV CODE- 636/637: Performed by: INTERNAL MEDICINE

## 2020-07-18 PROCEDURE — 77030018846 HC SOL IRR STRL H20 ICUM -A

## 2020-07-18 PROCEDURE — 74011636637 HC RX REV CODE- 636/637: Performed by: SURGERY

## 2020-07-18 PROCEDURE — 65270000029 HC RM PRIVATE

## 2020-07-18 PROCEDURE — 77030008771 HC TU NG SALEM SUMP -A

## 2020-07-18 RX ORDER — POTASSIUM CHLORIDE 20 MEQ/1
40 TABLET, EXTENDED RELEASE ORAL
Status: COMPLETED | OUTPATIENT
Start: 2020-07-18 | End: 2020-07-18

## 2020-07-18 RX ORDER — INSULIN LISPRO 100 [IU]/ML
INJECTION, SOLUTION INTRAVENOUS; SUBCUTANEOUS EVERY 6 HOURS
Status: DISCONTINUED | OUTPATIENT
Start: 2020-07-18 | End: 2020-07-19

## 2020-07-18 RX ADMIN — HYDRALAZINE HYDROCHLORIDE 10 MG: 20 INJECTION INTRAMUSCULAR; INTRAVENOUS at 04:26

## 2020-07-18 RX ADMIN — INSULIN LISPRO 2 UNITS: 100 INJECTION, SOLUTION INTRAVENOUS; SUBCUTANEOUS at 06:00

## 2020-07-18 RX ADMIN — DEXTROSE MONOHYDRATE AND POTASSIUM CHLORIDE: 5; .149 INJECTION, SOLUTION INTRAVENOUS at 15:40

## 2020-07-18 RX ADMIN — HYDROMORPHONE HYDROCHLORIDE 1 MG: 1 INJECTION, SOLUTION INTRAMUSCULAR; INTRAVENOUS; SUBCUTANEOUS at 06:31

## 2020-07-18 RX ADMIN — METOPROLOL TARTRATE 2.5 MG: 5 INJECTION INTRAVENOUS at 06:41

## 2020-07-18 RX ADMIN — HYDROMORPHONE HYDROCHLORIDE 1 MG: 1 INJECTION, SOLUTION INTRAMUSCULAR; INTRAVENOUS; SUBCUTANEOUS at 01:19

## 2020-07-18 RX ADMIN — PIPERACILLIN AND TAZOBACTAM 3.38 G: 3; .375 INJECTION, POWDER, LYOPHILIZED, FOR SOLUTION INTRAVENOUS at 17:30

## 2020-07-18 RX ADMIN — HYDROMORPHONE HYDROCHLORIDE 1 MG: 1 INJECTION, SOLUTION INTRAMUSCULAR; INTRAVENOUS; SUBCUTANEOUS at 20:23

## 2020-07-18 RX ADMIN — CLONIDINE HYDROCHLORIDE 0.1 MG: 0.1 TABLET ORAL at 09:17

## 2020-07-18 RX ADMIN — CLONIDINE HYDROCHLORIDE 0.1 MG: 0.1 TABLET ORAL at 18:37

## 2020-07-18 RX ADMIN — PIPERACILLIN AND TAZOBACTAM 3.38 G: 3; .375 INJECTION, POWDER, LYOPHILIZED, FOR SOLUTION INTRAVENOUS at 09:16

## 2020-07-18 RX ADMIN — HYDROMORPHONE HYDROCHLORIDE 1 MG: 1 INJECTION, SOLUTION INTRAMUSCULAR; INTRAVENOUS; SUBCUTANEOUS at 18:36

## 2020-07-18 RX ADMIN — INSULIN GLARGINE 8 UNITS: 100 INJECTION, SOLUTION SUBCUTANEOUS at 10:15

## 2020-07-18 RX ADMIN — HYDROMORPHONE HYDROCHLORIDE 1 MG: 1 INJECTION, SOLUTION INTRAMUSCULAR; INTRAVENOUS; SUBCUTANEOUS at 03:25

## 2020-07-18 RX ADMIN — HYDRALAZINE HYDROCHLORIDE 10 MG: 20 INJECTION INTRAMUSCULAR; INTRAVENOUS at 09:14

## 2020-07-18 RX ADMIN — HYDROMORPHONE HYDROCHLORIDE 1 MG: 1 INJECTION, SOLUTION INTRAMUSCULAR; INTRAVENOUS; SUBCUTANEOUS at 22:57

## 2020-07-18 RX ADMIN — HYDROMORPHONE HYDROCHLORIDE 1 MG: 1 INJECTION, SOLUTION INTRAMUSCULAR; INTRAVENOUS; SUBCUTANEOUS at 10:10

## 2020-07-18 RX ADMIN — METOPROLOL TARTRATE 2.5 MG: 5 INJECTION INTRAVENOUS at 00:27

## 2020-07-18 RX ADMIN — TAMSULOSIN HYDROCHLORIDE 0.4 MG: 0.4 CAPSULE ORAL at 09:17

## 2020-07-18 RX ADMIN — PIPERACILLIN AND TAZOBACTAM 3.38 G: 3; .375 INJECTION, POWDER, LYOPHILIZED, FOR SOLUTION INTRAVENOUS at 01:19

## 2020-07-18 RX ADMIN — HYDROMORPHONE HYDROCHLORIDE 1 MG: 1 INJECTION, SOLUTION INTRAMUSCULAR; INTRAVENOUS; SUBCUTANEOUS at 15:40

## 2020-07-18 RX ADMIN — POTASSIUM CHLORIDE 40 MEQ: 1500 TABLET, EXTENDED RELEASE ORAL at 12:17

## 2020-07-18 RX ADMIN — METOPROLOL TARTRATE 50 MG: 50 TABLET, FILM COATED ORAL at 18:36

## 2020-07-18 RX ADMIN — PANTOPRAZOLE SODIUM 40 MG: 40 INJECTION, POWDER, FOR SOLUTION INTRAVENOUS at 12:17

## 2020-07-18 NOTE — PERIOP NOTES
TRANSFER - OUT REPORT:    Verbal report given to adele rn(name) on Kym  being transferred to [facility](unit) for routine post - op       Report consisted of patients Situation, Background, Assessment and   Recommendations(SBAR). Information from the following report(s) SBAR, OR Summary, Intake/Output and MAR was reviewed with the receiving nurse. Lines:   Peripheral IV 07/15/20 Left Forearm (Active)   Site Assessment Clean, dry, & intact 07/17/20 1900   Phlebitis Assessment 0 07/17/20 1900   Infiltration Assessment 0 07/17/20 1900   Dressing Status Clean, dry, & intact 07/17/20 1900   Dressing Type Transparent;Tape 07/17/20 1900   Hub Color/Line Status Infusing;Pink 07/17/20 1900   Action Taken Other (comment) 07/17/20 0341   Alcohol Cap Used Yes 07/17/20 0341        Opportunity for questions and clarification was provided.       Patient transported with:   Registered Nurse      2030 patient transferred to [facility] in good condition no family to update per patient

## 2020-07-18 NOTE — PROGRESS NOTES
2020  Took report from charge nurse. I was attending to a patient when PACU pelletier to give report. 2030  Patient arrived in the unit from PACU. Patient is alert and oriented x 4 but groggy. Did not transfer him to the unit's bed and just used the bed that he was in at PACU because he was still in pain and half asleep. Patient did complain of pain from surgical site. Dressing on abdomen CDI. Placed o2 on for comfort but patient did not want it. 2100  Patient requested pain medication for abdominal pain. 2200  Patient was too sleepy to administer eye medication. 0030  Patient's NGT was not draining when I checked. Called charge nurse for help. Charge nurse flushed NGT several times, verified placement. She was able to get the drain flowing. 0120  Patient requested pain medication    0300  Patient's NGT was on the floor when I did my rounds. Patient was sitting on the side of the bed. He said that he sneezed and the NGT fell out and on the floor. Charge nurse inserted a new now. 0730  Bedside, Verbal, and Written shift change report given to Jordon Al5 (oncoming nurse) by [facility] (offgoing nurse). Report included the following information SBAR, Kardex, and MAR.

## 2020-07-18 NOTE — PROGRESS NOTES
Problem: Diabetes Self-Management  Goal: *Disease process and treatment process  Description: Define diabetes and identify own type of diabetes; list 3 options for treating diabetes. Outcome: Progressing Towards Goal  Goal: *Incorporating nutritional management into lifestyle  Description: Describe effect of type, amount and timing of food on blood glucose; list 3 methods for planning meals. Outcome: Progressing Towards Goal  Goal: *Incorporating physical activity into lifestyle  Description: State effect of exercise on blood glucose levels. Outcome: Progressing Towards Goal  Goal: *Developing strategies to promote health/change behavior  Description: Define the ABC's of diabetes; identify appropriate screenings, schedule and personal plan for screenings. Outcome: Progressing Towards Goal  Goal: *Using medications safely  Description: State effect of diabetes medications on diabetes; name diabetes medication taking, action and side effects. Outcome: Progressing Towards Goal  Goal: *Monitoring blood glucose, interpreting and using results  Description: Identify recommended blood glucose targets  and personal targets. Outcome: Progressing Towards Goal  Goal: *Prevention, detection, treatment of acute complications  Description: List symptoms of hyper- and hypoglycemia; describe how to treat low blood sugar and actions for lowering  high blood glucose level. Outcome: Progressing Towards Goal  Goal: *Prevention, detection and treatment of chronic complications  Description: Define the natural course of diabetes and describe the relationship of blood glucose levels to long term complications of diabetes.   Outcome: Progressing Towards Goal  Goal: *Developing strategies to address psychosocial issues  Description: Describe feelings about living with diabetes; identify support needed and support network  Outcome: Progressing Towards Goal  Goal: *Insulin pump training  Outcome: Progressing Towards Goal  Goal: *Sick day guidelines  Outcome: Progressing Towards Goal  Goal: *Patient Specific Goal (EDIT GOAL, INSERT TEXT)  Outcome: Progressing Towards Goal     Problem: Patient Education: Go to Patient Education Activity  Goal: Patient/Family Education  Outcome: Progressing Towards Goal     Problem: Falls - Risk of  Goal: *Absence of Falls  Description: Document Nico Oscar Fall Risk and appropriate interventions in the flowsheet. Outcome: Progressing Towards Goal  Note: Fall Risk Interventions:  Mobility Interventions: Communicate number of staff needed for ambulation/transfer         Medication Interventions: Evaluate medications/consider consulting pharmacy    Elimination Interventions: Call light in reach, Patient to call for help with toileting needs              Problem: Patient Education: Go to Patient Education Activity  Goal: Patient/Family Education  Outcome: Progressing Towards Goal     Problem: Discharge Planning  Goal: *Discharge to safe environment  Outcome: Progressing Towards Goal     Problem: Pressure Injury - Risk of  Goal: *Prevention of pressure injury  Description: Document Rai Scale and appropriate interventions in the flowsheet.   Outcome: Progressing Towards Goal  Note: Pressure Injury Interventions:       Moisture Interventions: Absorbent underpads    Activity Interventions: Pressure redistribution bed/mattress(bed type)    Mobility Interventions: Pressure redistribution bed/mattress (bed type)    Nutrition Interventions: Document food/fluid/supplement intake                     Problem: Patient Education: Go to Patient Education Activity  Goal: Patient/Family Education  Outcome: Progressing Towards Goal     Problem: Nutrition Deficit  Goal: *Optimize nutritional status  Outcome: Progressing Towards Goal     Problem: Pain  Goal: *Control of Pain  Outcome: Progressing Towards Goal     Problem: Patient Education: Go to Patient Education Activity  Goal: Patient/Family Education  Outcome: Progressing Towards Goal

## 2020-07-18 NOTE — ROUTINE PROCESS
Assisted primary nurse with hydralazine administration for blood pressure over 170s/90s. Patient tolerated well. Administered over 5 minutes. No adverse reactions noted.     Burke King, RN, BSN

## 2020-07-18 NOTE — PROGRESS NOTES
Patient seen and examined. He is doing much better. He stated that he has abdominal pain but it is only at the incision site and it is different from the pain from yesterday before the surgery. He even has passed flatus. He has no fever but has mild tachycardia. His NG tube was pulled by mistake but it was replaced. His abdomen is soft and mildly tender at the incision but less distended.     Plan:  I appreciate the medicine admission and management  Continue with the NG tube for 1 more day  Ambulation  IV fluids  N.p.o.  For the results of the biopsy from the mesenteric lymph node  Will follow very closely

## 2020-07-18 NOTE — ROUTINE PROCESS
Called to room to assist primary nurse with metoprolol. Noted no drainage from NGT in suction cannister. NGT flushed with sterile water. Patient tolerated well. Noted dark green drainage collecting in suction cannister. Placement verified. Blood pressure reassessed. Noted NSR after tachycardia prior to metoprolol. Telemetry notified prior, during, and after metoprolol administration. Patient able to sit at bedside to use urinal.  Noted clear yellow urine. Patient stated that abdominal pain subsided after irrigation of NGT.     Oskar Duncan, RN, BSN

## 2020-07-18 NOTE — OP NOTES
[facility]  OPERATIVE REPORT    Name:  Eric Gottlieb  MR#:   [id number]  :  1955  ACCOUNT #:  [de-identified]  DATE OF SERVICE:  2020    PREOPERATIVE DIAGNOSIS:  Small bowel obstruction. POSTOPERATIVE DIAGNOSIS:  Small bowel obstruction secondary to what appeared to be a small bowel volvulus secondary to mesenteric adhesions and bands as well as diffuse mesenteric lymphadenopathy. PROCEDURE PERFORMED:  Exploratory laparotomy, reduction of small bowel volvulus, lysis of mesenteric adhesions, and mesenteric lymph node biopsy. SURGEON:  Michelle Pierre MD    ASSISTANT:  Tom Mccoy    ANESTHESIA:  General.    COMPLICATIONS:  None. SPECIMENS REMOVED:  Mesenteric lymph nodes. IMPLANTS:  None. ESTIMATED BLOOD LOSS:  Minimal.    INDICATIONS FOR SURGERY:  The patient is a 49-year-old male patient who presented to the emergency room with abdominal pain, nausea, and some vomiting as well as a picture of pneumonia. He was admitted to the Medicine Service for five days. However, he continued to have some picture of ileus versus bowel obstruction on an x-ray despite the fact that he was passing gas and having bowel movements. I was consulted for surgical evaluation. When I examined the patient the first day, he was soft, nontender, and he was passing gas, having bowel movement, and his vital signs were normal except for one episode of fever which was attributed to his pneumonia. At that point, we decided to observe him clinically. For two days, he was doing relatively well. However, last night, he had nausea and severe abdominal pain and then he had vomiting, and a repeat CT scan showed persistence of his bowel obstruction.   What was interesting on the CT scan is that there was an area of the bowel that was significantly distended, but also distal to it, there was decompression but also, interestingly, proximal to it, there was also decompressed bowel, so this was suspicious for a volvulus. However, there was no clear swirling of the mesentery of the CT scan to confirm the volvulus itself. However, because the patient was having abdominal pain, I was concerned, so I decided to take him for a laparotomy, and a consent was taken from the patient. PROCEDURE:  The patient was brought to the operating room. Anesthesia was induced. Scrubbing and draping of the abdomen was done in the usual manner. A time-out was performed. A skin incision in the midline was performed. It was started above the umbilicus and slightly below the umbilicus. It was about 10 cm long, deepened through the skin, subcutaneous tissue, and fascia. The peritoneum was opened with Metzenbaums and two DeBakey because the bowel was distended, I did not want to injure the small bowel. So, when we entered the abdomen, there was some ascitic fluid, but there was no bowel content, and at this point, the small bowel was severely distended. I had to make the skin incision and the midline laparotomy slightly bigger superiorly and inferiorly, and I was able to bring the whole small bowel up, and interestingly, there was a decompressed proximal and distal small bowel, but just at the junction between the decompressed distal small bowel and bowel dilatation, there was significant adhesion and thickening and bands at the mesentery, causing what looked to be a small bowel volvulus and also, it was kinking the proximal small bowel causing a dilatation in the middle of the bowel itself, which was the reason there was only obstruction in the middle and decompressed bowel proximally and distally to it. What was also interesting was that the adhesions were not like lap band between the duodenum and the bowel, but they were only in the mesentery of the bowel itself causing it to be kinked and severely distended. However, the bowel was clearly viable and functioning.   So, at this point, I took all these bands down and the adhesion, and what was interesting also was I noticed multiple diffuse lymphadenopathy of the mesentery of the bowel, and this diffuse lymphadenopathy was not only at the site where the obstruction was, but it was all over the mesentery of the small bowel including the one where the bowel was decompressed proximally and distally. So, that made me think that the adhesions could be a type of lymphoma or some other type of malignancy. I did run the small bowel twice from the ligament of Treitz to the ileocecal valve to make sure there was no small bowel tumor or adenocarcinoma or any masses, and it was completely normal except for the distention, and I did look at the liver and the peritoneum. There was no evidence of any metastatic disease. I tried to look at the spleen since I suspected it could be some type of lymphoma, but I could not identify the spleen because it was high up and I did not want to do any bigger incision just to look at it because the CT scan was relatively normal.  So, at this point, I again looked at the mesentery of the small bowel, and I decided to take a small lymph node biopsy just for confirmation to see if it is reactive or some type of lymphoma or other pathology. This was done with Metzenbaums and two DeBakeshon and it was superficial and it was taken out completely and sent for permanent pathology. Hemostasis was secured and again, I looked at the mesentery of the bowel and there was no twisting or kinking of the mesentery anymore. However, the bowel was very distended still and I was concerned of closing the abdomen with that much distention. I did check the NG tube which was in the stomach and I was able to milk the content of the small bowel proximally into the stomach and the NG tube was used to suction all of these, and at this point, I was able to decompress partially the small bowel. I was able to easily place them back into the abdomen and placed the omentum on top of them.   At that point, I did close the fascia with a #1 PDS nonlooped in a running fashion from up to the middle and from down to the upper part, but I also placed four interrupted sutures of #1 PDS in between them just in case these do not hold because of his bowel distention, and then, I tied all of these making sure that there was no bowel entrapment, and of course, before tying  these, I placed the omentum on top of the small bowel, and at this point, I looked at the wound and hemostasis was clean and well secured, so I closed the skin with a skin stapler and gauze and tape.       Ranjana Melendrez MD      [id number]  D:  07/17/2020 18:53  T:  07/18/2020 4:35  JOB #:  [id number]

## 2020-07-18 NOTE — ROUTINE PROCESS
Called to patient's room because he told the primary nurse that he sneezed out his NGT. New NGT inserted without incidence. Patient tolerated well. Inserted to 70 cms as with previous NGT. CXR order written by primary nurse to verify placement. Noted dark green drainage after NGT insertion. [id number]  Radiology on the unit.     Jaimee Lance, RN, BSN

## 2020-07-18 NOTE — PROGRESS NOTES
Problem: Falls - Risk of  Goal: *Absence of Falls  Description: Document Walworthsylvia Oscar Fall Risk and appropriate interventions in the flowsheet. Outcome: Progressing Towards Goal  Note: Fall Risk Interventions:  Mobility Interventions: Communicate number of staff needed for ambulation/transfer         Medication Interventions: Evaluate medications/consider consulting pharmacy    Elimination Interventions: Call light in reach, Patient to call for help with toileting needs              Problem: Pressure Injury - Risk of  Goal: *Prevention of pressure injury  Description: Document Rai Scale and appropriate interventions in the flowsheet.   Outcome: Progressing Towards Goal  Note: Pressure Injury Interventions:       Moisture Interventions: Absorbent underpads    Activity Interventions: Pressure redistribution bed/mattress(bed type)    Mobility Interventions: Pressure redistribution bed/mattress (bed type)    Nutrition Interventions: Document food/fluid/supplement intake                     Problem: Pain  Goal: *Control of Pain  Outcome: Progressing Towards Goal

## 2020-07-18 NOTE — PROGRESS NOTES
Internal Medicine Progress Note        NAME: Lorna Brasher   :  1955  MRM:  [id number]    Date/Time: 2020        ASSESSMENT/PLAN:       # SBO. S/P laparotomy. Appreciate surgery consult. repeated CT abd/pelvic finding also serial KUB monitoered. Keep npo except meds. NGT per surgery    IVF. Mobilization. Maintain K/lytes level           # RML pneumonia. Follow CXR. Maintain O2.     - culture of blood NTD  - spiked of temp. Started  on iv zosyn. No further spikes. Nurses instructed to check BC if spiked 101 or more. # Hypokalemia. Replete and trend     # stage 3 PRADEEP/CKD3 . Improving  with hydration. Continue to improve. Continue hydration. Monitor I/O    Monitor Renal function and other labs as indicated. Avoid nephrotoxins , iv Contrast, NSAID. Renally dosing medications. Monitor urine out put. # DM. Provide SSI, hypoglycemia protocol and frequent Accu checks. Provide SSI, hypoglycemia protocol and frequent Accu checks. Education,  diabetic educator  . Diabetic Diet     # TCP. Improved. # Nausea not controlled by zofran. Added phenergan     # Mild hypernatremia. Changed ivf to D5 Kcl. Improved    # Hypokalemia. Replete and trend.      # Moderate malnutrition    OF Acute illness    # PT        - Cont acceptable home medications for chronic conditions   - DVT protocol    IP CONSULT TO HOSPITALIST  IP CONSULT TO GENERAL SURGERY    Lab Review:     Recent Labs     20  0540 20  0415 20  0345   WBC 17.0* 13.1 8.5   HGB 12.2* 11.3* 10.8*   HCT 35.0* 33.2* 32.7*    150 136     Recent Labs     20  0540 20  0415 20  0345    137 145   K 3.4* 3.0* 2.9*    106 111   CO2 23 25 28   * 147* 143*   BUN 11 21* 24*   CREA 0.84 1.06 1.05   CA 7.5* 7.5* 7.7*   MG  --  1.6 1.8   PHOS  --  2.4* 2.7   ALB  --  2.2* 2.0*   TBILI  --  2.9* 2.0*   ALT  --  41 41   INR  --   --  1.2     Lab Results   Component Value Date/Time    Glucose (POC) 161 (H) 07/18/2020 06:50 AM    Glucose (POC) 129 (H) 07/17/2020 10:02 PM    Glucose (POC) 132 (H) 07/17/2020 07:56 PM    Glucose (POC) 134 (H) 07/17/2020 03:48 PM    Glucose (POC) 170 (H) 07/17/2020 11:49 AM     No results for input(s): PH, PCO2, PO2, HCO3, FIO2 in the last 72 hours. Recent Labs     07/16/20  0345   INR 1.2       No results found for: SDES  Lab Results   Component Value Date/Time    Culture result: NO GROWTH 6 DAYS 07/11/2020 04:04 PM    Culture result: NO GROWTH 6 DAYS 07/11/2020 04:04 PM      All Cardiac Markers in the last 24 hours: No results found for: CPK, CK, CKMMB, CKMB, RCK3, CKMBT, CKNDX, CKND1, TRE, TROPT, TROIQ, YOUSUF, TROPT, TNIPOC, BNP, BNPP      Intervals noted   Pt s/e @ bedside     Subjective:     Chief Complaint:      abd pains and tenderness from surgery  , under control       ROS:  (bold if positive,otherwise negative). Fever/chills ,  Dysuria   Cough , Sputum , SOB/HUNT , Chest Pain     Diarrhea ,Nausea/Vomit , Abd Pain , Constipation            Objective:     Vitals:  Last 24hrs VS reviewed since prior progress note. Most recent are:    Visit Vitals  BP (!) 165/97 (BP 1 Location: Right arm, BP Patient Position: At rest)   Pulse 92   Temp 99.2 °F (37.3 °C)   Resp 18   Ht 5' 10\" (1.778 m)   Wt 78.7 kg (173 lb 6.4 oz)   SpO2 100%   BMI 24.88 kg/m²     SpO2 Readings from Last 6 Encounters:   07/18/20 100%   06/30/20 98%   12/22/19 97%   07/16/19 96%   05/13/19 93%   04/16/19 98%            Intake/Output Summary (Last 24 hours) at 7/18/2020 1150  Last data filed at 7/18/2020 0730  Gross per 24 hour   Intake 2218 ml   Output 1950 ml   Net 268 ml          Physical Exam:   General Appearance: NAD, conversant  Lungs: CTA with normal respiratory effort  CV: RRR, no m/r/g  Abdomen: tender at surgery site, surgical wound dressed. slightly tense . Reduced BS    Extremities: no cyanosis, no peripheral edema  Neuro: No focal deficits, motor/sensory intact.      Medications Reviewed: (see below)    Lab Data Reviewed: (see below)    ______________________________________________________________________    Medications:     Current Facility-Administered Medications   Medication Dose Route Frequency    insulin lispro (HUMALOG) injection   SubCUTAneous Q6H    metoprolol (LOPRESSOR) injection 2.5 mg  2.5 mg IntraVENous Q6H    HYDROmorphone (DILAUDID) injection 1 mg  1 mg IntraVENous Q2H PRN    piperacillin-tazobactam (ZOSYN) 3.375 g in 0.9% sodium chloride (MBP/ADV) 100 mL MBP \"EXTENDED 4 HOUR INFUSION ###  3.375 g IntraVENous Q8H    benzocaine-menthoL (CEPACOL) lozenge 1 Lozenge  1 Lozenge Mucous Membrane Q2.5H PRN    insulin glargine (LANTUS) injection 8 Units  8 Units SubCUTAneous DAILY    dextrose 5% with KCl 20 mEq/L infusion   IntraVENous CONTINUOUS    pantoprazole (PROTONIX) injection 40 mg  40 mg IntraVENous Q24H    cloNIDine HCL (CATAPRES) tablet 0.1 mg  0.1 mg Oral BID    [Held by provider] NIFEdipine ER (PROCARDIA XL) tablet 90 mg  90 mg Oral DAILY    promethazine (PHENERGAN) injection 25 mg  25 mg IntraMUSCular Q6H PRN    hydrALAZINE (APRESOLINE) 20 mg/mL injection 10 mg  10 mg IntraVENous Q6H PRN    ondansetron (ZOFRAN) injection 4 mg  4 mg IntraVENous Q4H PRN    tamsulosin (FLOMAX) capsule 0.4 mg  0.4 mg Oral DAILY    latanoprost (XALATAN) 0.005 % ophthalmic solution 1 Drop  1 Drop Left Eye QHS    [Held by provider] metoprolol tartrate (LOPRESSOR) tablet 50 mg  50 mg Oral BID    [Held by provider] amLODIPine (NORVASC) tablet 10 mg  10 mg Oral DAILY    glucose chewable tablet 16 g  4 Tab Oral PRN    glucagon (GLUCAGEN) injection 1 mg  1 mg IntraMUSCular PRN    dextrose 10% infusion 125-250 mL  125-250 mL IntraVENous PRN    docusate sodium (COLACE) capsule 100 mg  100 mg Oral BID PRN    melatonin tablet 12 mg  12 mg Oral QHS PRN    albuterol-ipratropium (DUO-NEB) 2.5 MG-0.5 MG/3 ML  3 mL Nebulization Q4H PRN    acetaminophen (TYLENOL) tablet 650 mg  650 mg Oral Q6H PRN Total time spent with patient: 35 minutes. Care Plan discussed with: Patient, Care Manager, Nursing Staff and >50% of time spent in counseling and coordination of care DW surgery     Discussed:  Care Plan    Prophylaxis:  SCD's    Disposition:  Home w/Family           This document in whole or part of it has been produced using voice recognition software. Unrecognized errors in transcription may be present.     Attending Physician: Bambi Saldivar MD

## 2020-07-18 NOTE — PROGRESS NOTES
Problem: Diabetes Self-Management  Goal: *Disease process and treatment process  Description: Define diabetes and identify own type of diabetes; list 3 options for treating diabetes. 7/18/2020 0529 by Leyda Girard RN  Outcome: Progressing Towards Goal  7/17/2020 2356 by Leyda Girard RN  Outcome: Progressing Towards Goal  Goal: *Incorporating nutritional management into lifestyle  Description: Describe effect of type, amount and timing of food on blood glucose; list 3 methods for planning meals. 7/18/2020 0529 by Leyda Girard RN  Outcome: Progressing Towards Goal  7/17/2020 2356 by Leyda Girard RN  Outcome: Progressing Towards Goal  Goal: *Incorporating physical activity into lifestyle  Description: State effect of exercise on blood glucose levels. 7/18/2020 0529 by Leyda Girard RN  Outcome: Progressing Towards Goal  7/17/2020 2356 by Leyda Girard RN  Outcome: Progressing Towards Goal  Goal: *Developing strategies to promote health/change behavior  Description: Define the ABC's of diabetes; identify appropriate screenings, schedule and personal plan for screenings. 7/18/2020 0529 by Leyda Girard RN  Outcome: Progressing Towards Goal  7/17/2020 2356 by Leyda Girard RN  Outcome: Progressing Towards Goal  Goal: *Using medications safely  Description: State effect of diabetes medications on diabetes; name diabetes medication taking, action and side effects. 7/18/2020 0529 by Leyda Girard RN  Outcome: Progressing Towards Goal  7/17/2020 2356 by Leyda Girard RN  Outcome: Progressing Towards Goal  Goal: *Monitoring blood glucose, interpreting and using results  Description: Identify recommended blood glucose targets  and personal targets.   7/18/2020 0529 by Leyda Girard RN  Outcome: Progressing Towards Goal  7/17/2020 2356 by Leyda Girard RN  Outcome: Progressing Towards Goal  Goal: *Prevention, detection, treatment of acute complications  Description: List symptoms of hyper- and hypoglycemia; describe how to treat low blood sugar and actions for lowering  high blood glucose level. 7/18/2020 0529 by Heather Tapia RN  Outcome: Progressing Towards Goal  7/17/2020 2356 by Heather Tapia RN  Outcome: Progressing Towards Goal  Goal: *Prevention, detection and treatment of chronic complications  Description: Define the natural course of diabetes and describe the relationship of blood glucose levels to long term complications of diabetes.   7/18/2020 0529 by Heather Tapia RN  Outcome: Progressing Towards Goal  7/17/2020 2356 by Heather Tapia RN  Outcome: Progressing Towards Goal  Goal: *Developing strategies to address psychosocial issues  Description: Describe feelings about living with diabetes; identify support needed and support network  7/18/2020 0529 by Heather Tapia RN  Outcome: Progressing Towards Goal  7/17/2020 2356 by Heather Tapia RN  Outcome: Progressing Towards Goal  Goal: *Insulin pump training  7/18/2020 0529 by Heather Tapia RN  Outcome: Progressing Towards Goal  7/17/2020 2356 by Heather Tapia RN  Outcome: Progressing Towards Goal  Goal: *Sick day guidelines  7/18/2020 0529 by Heather Tapia RN  Outcome: Progressing Towards Goal  7/17/2020 2356 by Heather Tapia RN  Outcome: Progressing Towards Goal  Goal: *Patient Specific Goal (EDIT GOAL, INSERT TEXT)  7/18/2020 0529 by Heather Tapia RN  Outcome: Progressing Towards Goal  7/17/2020 2356 by Heather Tapia RN  Outcome: Progressing Towards Goal     Problem: Patient Education: Go to Patient Education Activity  Goal: Patient/Family Education  7/18/2020 0529 by Heather Tapia RN  Outcome: Progressing Towards Goal  7/17/2020 2356 by Heather Tapia RN  Outcome: Progressing Towards Goal     Problem: Falls - Risk of  Goal: *Absence of Falls  Description: Document Darryl Reece Fall Risk and appropriate interventions in the flowsheet. 7/18/2020 0529 by Dayo Tarango RN  Outcome: Progressing Towards Goal  Note: Fall Risk Interventions:  Mobility Interventions: Patient to call before getting OOB         Medication Interventions: Patient to call before getting OOB    Elimination Interventions: Call light in reach           7/17/2020 2356 by Dayo Tarango RN  Outcome: Progressing Towards Goal  Note: Fall Risk Interventions:  Mobility Interventions: Communicate number of staff needed for ambulation/transfer         Medication Interventions: Evaluate medications/consider consulting pharmacy    Elimination Interventions: Call light in reach, Patient to call for help with toileting needs              Problem: Patient Education: Go to Patient Education Activity  Goal: Patient/Family Education  7/18/2020 0529 by Dayo Tarango RN  Outcome: Progressing Towards Goal  7/17/2020 2356 by Dayo Tarango RN  Outcome: Progressing Towards Goal     Problem: Discharge Planning  Goal: *Discharge to safe environment  7/18/2020 0529 by Dayo Tarango RN  Outcome: Progressing Towards Goal  7/17/2020 2356 by Dayo Tarango RN  Outcome: Progressing Towards Goal     Problem: Pressure Injury - Risk of  Goal: *Prevention of pressure injury  Description: Document Rai Scale and appropriate interventions in the flowsheet.   7/18/2020 0529 by Dayo Tarango RN  Outcome: Progressing Towards Goal  Note: Pressure Injury Interventions:       Moisture Interventions: Absorbent underpads    Activity Interventions: Increase time out of bed    Mobility Interventions: HOB 30 degrees or less    Nutrition Interventions: Document food/fluid/supplement intake                  7/17/2020 2356 by Dayo Tarango RN  Outcome: Progressing Towards Goal  Note: Pressure Injury Interventions:       Moisture Interventions: Absorbent underpads    Activity Interventions: Pressure redistribution bed/mattress(bed type)    Mobility Interventions: Pressure redistribution bed/mattress (bed type)    Nutrition Interventions: Document food/fluid/supplement intake                     Problem: Patient Education: Go to Patient Education Activity  Goal: Patient/Family Education  7/18/2020 0529 by Rachel Garay RN  Outcome: Progressing Towards Goal  7/17/2020 2356 by Rachel Garay RN  Outcome: Progressing Towards Goal     Problem: Nutrition Deficit  Goal: *Optimize nutritional status  7/18/2020 0529 by Rachel Garay RN  Outcome: Progressing Towards Goal  7/17/2020 2356 by Rachel Garay RN  Outcome: Progressing Towards Goal     Problem: Pain  Goal: *Control of Pain  7/18/2020 0529 by Rachel Garay RN  Outcome: Progressing Towards Goal  7/17/2020 2356 by Rachel Garay RN  Outcome: Progressing Towards Goal     Problem: Patient Education: Go to Patient Education Activity  Goal: Patient/Family Education  7/18/2020 0529 by Rachel Garay RN  Outcome: Progressing Towards Goal  7/17/2020 2356 by Rachel Garay RN  Outcome: Progressing Towards Goal

## 2020-07-18 NOTE — ROUTINE PROCESS
0730-- Bedside, Verbal and Written shift change report received by Sofia Richey (oncoming nurse) by [employer] nurse). Allergy band placed on pt's wrist. Report included the following information SBAR, Kardex, Intake/Output, MAR and Recent Results. 4463 --Assessment completed, call bell within reach, no distress noted. PM  medications administered, pt tolerated with ease, will continue to monitor. 1200 -- Shift reassessment, pt condition unchanged, will continue to monitor. 1600 --  Shift reassessment, pt condition unchanged, will continue to monitor. 1630-taken to OR via bed. 1930- Bedside, Verbal and Written shift change report given to Sebastien(oncoming nurse) by Sofia Richey (offgoing nurse). Report included the following information SBAR, Kardex, Intake/Output, MAR and Recent Results. Skin assessment completed.

## 2020-07-18 NOTE — PROGRESS NOTES
0800  Received pt on bed, with NGT to LIS at level of 70 ml, remain NPO, encourage IS, raised up  To 1000 ml temp. 99.5.    0900 assisted to the recliner, walk slowly. 1030  Back to bed, walk few steps in the room. 1345  Resting, IS encourage, raise up to 1000 ml only, temp down to 98.    1545  Medicated for pain, IS up to 1000  Ml,    1800  Assisted to the recliner, IS encourage    1830 flush NGT 3 x with 50 cc each,remain at 70 level, up on the recliner. All oral meds crush and soak in water, given via tube and followed with NS flush  And  Off suction for 20 minutes    1855  Back to LIS, pt on the recliner.

## 2020-07-19 LAB
ANION GAP SERPL CALC-SCNC: 4 MMOL/L (ref 3–18)
BASOPHILS # BLD: 0 K/UL (ref 0–0.1)
BASOPHILS NFR BLD: 0 % (ref 0–2)
BUN SERPL-MCNC: 12 MG/DL (ref 7–18)
BUN/CREAT SERPL: 14 (ref 12–20)
CALCIUM SERPL-MCNC: 7.2 MG/DL (ref 8.5–10.1)
CHLORIDE SERPL-SCNC: 107 MMOL/L (ref 100–111)
CO2 SERPL-SCNC: 25 MMOL/L (ref 21–32)
CREAT SERPL-MCNC: 0.88 MG/DL (ref 0.6–1.3)
DIFFERENTIAL METHOD BLD: ABNORMAL
EOSINOPHIL # BLD: 0.2 K/UL (ref 0–0.4)
EOSINOPHIL NFR BLD: 2 % (ref 0–5)
ERYTHROCYTE [DISTWIDTH] IN BLOOD BY AUTOMATED COUNT: 12.7 % (ref 11.6–14.5)
GLUCOSE BLD STRIP.AUTO-MCNC: 102 MG/DL (ref 70–110)
GLUCOSE BLD STRIP.AUTO-MCNC: 103 MG/DL (ref 70–110)
GLUCOSE BLD STRIP.AUTO-MCNC: 83 MG/DL (ref 70–110)
GLUCOSE BLD STRIP.AUTO-MCNC: 92 MG/DL (ref 70–110)
GLUCOSE BLD STRIP.AUTO-MCNC: 95 MG/DL (ref 70–110)
GLUCOSE SERPL-MCNC: 95 MG/DL (ref 74–99)
HCT VFR BLD AUTO: 30.5 % (ref 36–48)
HGB BLD-MCNC: 10.2 G/DL (ref 13–16)
LYMPHOCYTES # BLD: 1.5 K/UL (ref 0.9–3.6)
LYMPHOCYTES NFR BLD: 14 % (ref 21–52)
MAGNESIUM SERPL-MCNC: 1.5 MG/DL (ref 1.6–2.6)
MCH RBC QN AUTO: 31.6 PG (ref 24–34)
MCHC RBC AUTO-ENTMCNC: 33.4 G/DL (ref 31–37)
MCV RBC AUTO: 94.4 FL (ref 74–97)
MONOCYTES # BLD: 0.7 K/UL (ref 0.05–1.2)
MONOCYTES NFR BLD: 7 % (ref 3–10)
NEUTS SEG # BLD: 8.2 K/UL (ref 1.8–8)
NEUTS SEG NFR BLD: 77 % (ref 40–73)
PHOSPHATE SERPL-MCNC: 1.8 MG/DL (ref 2.5–4.9)
PLATELET # BLD AUTO: 171 K/UL (ref 135–420)
PMV BLD AUTO: 10.8 FL (ref 9.2–11.8)
POTASSIUM SERPL-SCNC: 3.6 MMOL/L (ref 3.5–5.5)
RBC # BLD AUTO: 3.23 M/UL (ref 4.7–5.5)
SODIUM SERPL-SCNC: 136 MMOL/L (ref 136–145)
WBC # BLD AUTO: 10.6 K/UL (ref 4.6–13.2)

## 2020-07-19 PROCEDURE — 74011250636 HC RX REV CODE- 250/636: Performed by: INTERNAL MEDICINE

## 2020-07-19 PROCEDURE — 74011000250 HC RX REV CODE- 250: Performed by: INTERNAL MEDICINE

## 2020-07-19 PROCEDURE — 74011000258 HC RX REV CODE- 258: Performed by: INTERNAL MEDICINE

## 2020-07-19 PROCEDURE — C9113 INJ PANTOPRAZOLE SODIUM, VIA: HCPCS | Performed by: INTERNAL MEDICINE

## 2020-07-19 PROCEDURE — 80048 BASIC METABOLIC PNL TOTAL CA: CPT

## 2020-07-19 PROCEDURE — 65270000029 HC RM PRIVATE

## 2020-07-19 PROCEDURE — 74011250636 HC RX REV CODE- 250/636: Performed by: SURGERY

## 2020-07-19 PROCEDURE — 84100 ASSAY OF PHOSPHORUS: CPT

## 2020-07-19 PROCEDURE — 74011250637 HC RX REV CODE- 250/637: Performed by: INTERNAL MEDICINE

## 2020-07-19 PROCEDURE — 36415 COLL VENOUS BLD VENIPUNCTURE: CPT

## 2020-07-19 PROCEDURE — 82962 GLUCOSE BLOOD TEST: CPT

## 2020-07-19 PROCEDURE — 85025 COMPLETE CBC W/AUTO DIFF WBC: CPT

## 2020-07-19 PROCEDURE — 74011636637 HC RX REV CODE- 636/637: Performed by: INTERNAL MEDICINE

## 2020-07-19 PROCEDURE — 83735 ASSAY OF MAGNESIUM: CPT

## 2020-07-19 RX ORDER — MAGNESIUM SULFATE HEPTAHYDRATE 40 MG/ML
2 INJECTION, SOLUTION INTRAVENOUS ONCE
Status: COMPLETED | OUTPATIENT
Start: 2020-07-19 | End: 2020-07-19

## 2020-07-19 RX ORDER — INSULIN LISPRO 100 [IU]/ML
INJECTION, SOLUTION INTRAVENOUS; SUBCUTANEOUS
Status: DISCONTINUED | OUTPATIENT
Start: 2020-07-20 | End: 2020-07-21 | Stop reason: HOSPADM

## 2020-07-19 RX ADMIN — HYDROMORPHONE HYDROCHLORIDE 1 MG: 1 INJECTION, SOLUTION INTRAMUSCULAR; INTRAVENOUS; SUBCUTANEOUS at 06:30

## 2020-07-19 RX ADMIN — PIPERACILLIN AND TAZOBACTAM 3.38 G: 3; .375 INJECTION, POWDER, LYOPHILIZED, FOR SOLUTION INTRAVENOUS at 08:15

## 2020-07-19 RX ADMIN — METOPROLOL TARTRATE 50 MG: 50 TABLET, FILM COATED ORAL at 17:43

## 2020-07-19 RX ADMIN — METOPROLOL TARTRATE 50 MG: 50 TABLET, FILM COATED ORAL at 10:22

## 2020-07-19 RX ADMIN — CLONIDINE HYDROCHLORIDE 0.1 MG: 0.1 TABLET ORAL at 17:43

## 2020-07-19 RX ADMIN — INSULIN GLARGINE 8 UNITS: 100 INJECTION, SOLUTION SUBCUTANEOUS at 10:20

## 2020-07-19 RX ADMIN — TAMSULOSIN HYDROCHLORIDE 0.4 MG: 0.4 CAPSULE ORAL at 10:22

## 2020-07-19 RX ADMIN — DEXTROSE MONOHYDRATE AND POTASSIUM CHLORIDE: 5; .149 INJECTION, SOLUTION INTRAVENOUS at 02:46

## 2020-07-19 RX ADMIN — PIPERACILLIN AND TAZOBACTAM 3.38 G: 3; .375 INJECTION, POWDER, LYOPHILIZED, FOR SOLUTION INTRAVENOUS at 16:16

## 2020-07-19 RX ADMIN — HYDROMORPHONE HYDROCHLORIDE 1 MG: 1 INJECTION, SOLUTION INTRAMUSCULAR; INTRAVENOUS; SUBCUTANEOUS at 16:22

## 2020-07-19 RX ADMIN — LATANOPROST 1 DROP: 50 SOLUTION OPHTHALMIC at 21:30

## 2020-07-19 RX ADMIN — MELATONIN 12 MG: at 21:30

## 2020-07-19 RX ADMIN — PANTOPRAZOLE SODIUM 40 MG: 40 INJECTION, POWDER, FOR SOLUTION INTRAVENOUS at 12:48

## 2020-07-19 RX ADMIN — HYDROMORPHONE HYDROCHLORIDE 1 MG: 1 INJECTION, SOLUTION INTRAMUSCULAR; INTRAVENOUS; SUBCUTANEOUS at 09:50

## 2020-07-19 RX ADMIN — AMLODIPINE BESYLATE 10 MG: 10 TABLET ORAL at 10:22

## 2020-07-19 RX ADMIN — SODIUM CHLORIDE: 900 INJECTION, SOLUTION INTRAVENOUS at 13:49

## 2020-07-19 RX ADMIN — HYDROMORPHONE HYDROCHLORIDE 1 MG: 1 INJECTION, SOLUTION INTRAMUSCULAR; INTRAVENOUS; SUBCUTANEOUS at 21:30

## 2020-07-19 RX ADMIN — CLONIDINE HYDROCHLORIDE 0.1 MG: 0.1 TABLET ORAL at 10:22

## 2020-07-19 RX ADMIN — HYDROMORPHONE HYDROCHLORIDE 1 MG: 1 INJECTION, SOLUTION INTRAMUSCULAR; INTRAVENOUS; SUBCUTANEOUS at 02:46

## 2020-07-19 RX ADMIN — MAGNESIUM SULFATE HEPTAHYDRATE 2 G: 40 INJECTION, SOLUTION INTRAVENOUS at 11:45

## 2020-07-19 RX ADMIN — DEXTROSE MONOHYDRATE AND POTASSIUM CHLORIDE: 5; .149 INJECTION, SOLUTION INTRAVENOUS at 21:34

## 2020-07-19 RX ADMIN — HYDROMORPHONE HYDROCHLORIDE 1 MG: 1 INJECTION, SOLUTION INTRAMUSCULAR; INTRAVENOUS; SUBCUTANEOUS at 12:57

## 2020-07-19 RX ADMIN — PIPERACILLIN AND TAZOBACTAM 3.38 G: 3; .375 INJECTION, POWDER, LYOPHILIZED, FOR SOLUTION INTRAVENOUS at 01:10

## 2020-07-19 NOTE — PROGRESS NOTES
Nutrition Assessment     Type and Reason for Visit: Reassess    Nutrition Recommendations/Plan:     1. If able trial clear liquid diet advancing as tolerated to GI Lite   2. Initiate oral nutrition supplements: Ensure Clear TID to supplement energy needs  3. Monitor labs, weight and PO intake/tolerance   4. Suggest alternate nutrition source if continuing NPO or unable to tolerate PO as not meeting nutritional needs x ~11 days and with weight loss      Nutrition Assessment:  General surgery following: s/p Exploratory laparotomy, reduction of small bowel volvulus, lysis of mesenteric adhesions, and mesenteric lymph node biopsy on (7/17). Patient seen in room this afternoon with NGT to LIS. Not meeting nutritional needs x ~11 days now and with weight loss. Malnutrition Assessment:  Malnutrition Status: Moderate malnutrition     Nutrition History and Allergies: No beef or pork. Poor PO take x 3 days PTA d/t N/V. Per documented weight Hx total weight loss ~20 lb or 10% since Oct 2019. Estimated Daily Nutrient Needs:  Energy (kcal):  6087-7105  Protein (g):  79-95       Fluid (ml/day):  1 mL/kcal    Nutrition Related Findings:  NGT to LIS. ABD pain at incision site and ABD less distended.  Has not passed flatus and last BM on (7/15) per I/Os      Additional Caloric Sources: IVF: D5W with KCl 20 mEq/L at 100 mL/hr (408 kcal/day)     Current Nutrition Therapies:  DIET NPO    Anthropometric Measures:  · Height:  5' 10\" (177.8 cm)  · Current Body Wt:  78.9 kg (173 lb 15.1 oz)  · BMI: 25    Nutrition Diagnosis:   · Inadequate oral intake, Moderate malnutrition, In context of acute illness or injury related to altered GI function as evidenced by poor intake prior to admission, NPO or clear liquid status due to medical condition, weight loss 1-2% in 1 week      Nutrition Intervention:  Food and/or Nutrient Delivery: Start oral diet, Start oral nutrition supplement(as tolerated)  Nutrition Education and Counseling: No recommendations at this time  Coordination of Nutrition Care: Continued inpatient monitoring    Goals:  Nutritional needs will be met through adequate oral intake or nutrition support within the next 3 days. Nutrition Monitoring and Evaluation:   Food/Nutrient Intake Outcomes: Diet advancement/tolerance, Supplement intake(if diet initiated)  Physical Signs/Symptoms Outcomes: Biochemical data, Chewing or swallowing, GI status, Nutrition focused physical findings, Weight    Discharge Planning:     Too soon to determine     Electronically signed by Sharren Saint on 7/18/2020 at 10:16 PM    Pager: [phone]

## 2020-07-19 NOTE — PROGRESS NOTES
5488 Bedside and Verbal shift change report given to RC RN (oncoming nurse) by Ki Neville RN (offgoing nurse). Report included the following information SBAR, Kardex, Intake/Output and MAR.       0835 NGT discontinued , catheter tip intact, pt tolerated well, no nausea or vomiting    0836 Bedside and Verbal shift change report given to Giovany Mireles  (oncoming nurse) by Nila Elam RN (offgoing nurse). Report included the following information SBAR, Kardex, Intake/Output and MAR.

## 2020-07-19 NOTE — PROGRESS NOTES
Patient seen and examined. He is doing well with no issues overnight. He stated that he has incisional pain. He passed flatus and he has no fever and his tachycardia has resolved. His NG tube output is 350 cc over the last 24 hours. His WBC is normal and his creatinine is normal and he is making good urine output  His abdomen is soft and non-distended with mild incisional tenderness.     Plan:  I appreciate the medicine admission and management  DC NG tube  Allow clear liquid diet  IV antibiotics and fluids per medicine team  Await the results of the biopsy from the mesenteric lymph node  Will follow very closely

## 2020-07-19 NOTE — ROUTINE PROCESS
Patient has eye drops scheduled for 2200. Looked for Xalatan eye drops in [address] [facility] Ref[facility] and patient specific bins. Pharmacy notified and will deliver on the first run from [facility]. Medication charted as missed because it is not available from the pharmacy at this time. Patient also states that his friend has delivered two phone chargers after his charged was misplaced after move from [address] [place] [address].  Phone call to security revealed that someone was at the  until 2000 but no  was delivered. Call also made to the ED to see if anyone had delivered the patient's charge there. No charge left in the ED. Noted that the patient has a short black  in his room on the tray table next to his bed. Did not see a  block to plug this  into the wall. Nursing supervisor notified of the above situation.     Adrian Dougherty, RN, BSN

## 2020-07-19 NOTE — ROUTINE PROCESS
Report received from Yarelis Ribera with sbar  Dr. Lozaon Lay I   ngt removed  1000Dilaudid given for pain  Tolerating sips of clears  Encouraged use of I.S,  Dilaudud given for pain  oob in chair  Dilaudid given for pain  Bedside shift report given to [facility] with sbar

## 2020-07-19 NOTE — PROGRESS NOTES
Problem: Falls - Risk of  Goal: *Absence of Falls  Description: Document Lucille Hinton Fall Risk and appropriate interventions in the flowsheet.   Outcome: Progressing Towards Goal  Note: Fall Risk Interventions:  Mobility Interventions: Patient to call before getting OOB         Medication Interventions: Bed/chair exit alarm, Patient to call before getting OOB    Elimination Interventions: Patient to call for help with toileting needs

## 2020-07-19 NOTE — PROGRESS NOTES
Internal Medicine Progress Note        NAME: Obdulio Ware   :  1955  MRM:  [id number]    Date/Time: 2020        ASSESSMENT/PLAN:  Stable pending surgery re NGT, I think can come out, out put 300 cc over 24  Hours in the canister. Replete mg, po4, K      # SBO. S/P laparotomy. Appreciate surgery consult. repeated CT abd/pelvic finding also serial KUB monitoered. Keep npo except meds. NGT per surgery    IVF. Mobilization. Maintain K/lytes level         # RML pneumonia. Follow CXR. Maintain O2.      - culture of blood NTD  - spiked of temp. Started  on iv zosyn. No further spikes. Nurses instructed to check BC if spiked 101 or more. # Hypokalemia. Replete and trend     # stage 3 PRADEEP/CKD3 . Improving  with hydration. Continue to improve. Continue hydration. Monitor I/O    Monitor Renal function and other labs as indicated. Avoid nephrotoxins , iv Contrast, NSAID. Renally dosing medications. Monitor urine out put. # DM. Provide SSI, hypoglycemia protocol and frequent Accu checks. Provide SSI, hypoglycemia protocol and frequent Accu checks. Education,  diabetic educator  . Diabetic Diet     # TCP. Improved. # Nausea not controlled by zofran. Added phenergan     # Mild hypernatremia. Changed ivf to D5 Kcl. Improved    # Hypokalemia. Replete and trend.      # Moderate malnutrition    OF Acute illness    # PT        - Cont acceptable home medications for chronic conditions   - DVT protocol    IP CONSULT TO HOSPITALIST  IP CONSULT TO GENERAL SURGERY    Lab Review:     Recent Labs     20  0440 20  0540 20  0415   WBC 10.6 17.0* 13.1   HGB 10.2* 12.2* 11.3*   HCT 30.5* 35.0* 33.2*    166 150     Recent Labs     20  0440 20  0540 20  0415    138 137   K 3.6 3.4* 3.0*    108 106   CO2 25 23 25   GLU 95 144* 147*   BUN 12 11 21*   CREA 0.88 0.84 1.06   CA 7.2* 7.5* 7.5*   MG 1.5*  --  1.6   PHOS 1.8*  --  2.4*   ALB  --   --  2.2* TBILI  --   --  2.9*   ALT  --   --  41     Lab Results   Component Value Date/Time    Glucose (POC) 103 07/19/2020 06:52 AM    Glucose (POC) 102 07/19/2020 12:19 AM    Glucose (POC) 125 (H) 07/18/2020 06:52 PM    Glucose (POC) 152 (H) 07/18/2020 12:44 PM    Glucose (POC) 161 (H) 07/18/2020 06:50 AM     No results for input(s): PH, PCO2, PO2, HCO3, FIO2 in the last 72 hours. No results for input(s): INR, INREXT, INREXT in the last 72 hours. No results found for: SDES  Lab Results   Component Value Date/Time    Culture result: NO GROWTH 6 DAYS 07/11/2020 04:04 PM    Culture result: NO GROWTH 6 DAYS 07/11/2020 04:04 PM      All Cardiac Markers in the last 24 hours: No results found for: CPK, CK, CKMMB, CKMB, RCK3, CKMBT, CKNDX, CKND1, TRE, TROPT, TROIQ, YOUSUF, TROPT, TNIPOC, BNP, BNPP      Intervals noted   Pt s/e @ bedside     Subjective:     Chief Complaint:      abd pains and tenderness from surgery getting better   counseled on howand when to use pain to ask about pain medicine and how to grade it, he co not getting pain medicine on time     ROS:  (bold if positive,otherwise negative). Fever/chills ,  Dysuria   Cough , Sputum , SOB/HUNT , Chest Pain     Diarrhea ,Nausea/Vomit , Abd Pain , Constipation            Objective:     Vitals:  Last 24hrs VS reviewed since prior progress note.  Most recent are:    Visit Vitals  /80 (BP 1 Location: Right arm, BP Patient Position: At rest)   Pulse 64   Temp 98.9 °F (37.2 °C)   Resp 16   Ht 5' 10\" (1.778 m)   Wt 78.7 kg (173 lb 6.4 oz)   SpO2 100%   BMI 24.88 kg/m²     SpO2 Readings from Last 6 Encounters:   07/19/20 100%   06/30/20 98%   12/22/19 97%   07/16/19 96%   05/13/19 93%   04/16/19 98%            Intake/Output Summary (Last 24 hours) at 7/19/2020 0929  Last data filed at 7/19/2020 8401  Gross per 24 hour   Intake 1310 ml   Output 2325 ml   Net -1015 ml          Physical Exam:   General Appearance: NAD, conversant  Lungs: CTA with normal respiratory effort  CV: RRR, no m/r/g  Abdomen: tender at surgery site, surgical wound dressed. slightly tense . Reduced BS    Extremities: no cyanosis, no peripheral edema  Neuro: No focal deficits, motor/sensory intact.      Medications Reviewed: (see below)    Lab Data Reviewed: (see below)    ______________________________________________________________________    Medications:     Current Facility-Administered Medications   Medication Dose Route Frequency    insulin lispro (HUMALOG) injection   SubCUTAneous Q6H    HYDROmorphone (DILAUDID) injection 1 mg  1 mg IntraVENous Q2H PRN    piperacillin-tazobactam (ZOSYN) 3.375 g in 0.9% sodium chloride (MBP/ADV) 100 mL MBP \"EXTENDED 4 HOUR INFUSION ###  3.375 g IntraVENous Q8H    benzocaine-menthoL (CEPACOL) lozenge 1 Lozenge  1 Lozenge Mucous Membrane Q2.5H PRN    insulin glargine (LANTUS) injection 8 Units  8 Units SubCUTAneous DAILY    dextrose 5% with KCl 20 mEq/L infusion   IntraVENous CONTINUOUS    pantoprazole (PROTONIX) injection 40 mg  40 mg IntraVENous Q24H    cloNIDine HCL (CATAPRES) tablet 0.1 mg  0.1 mg Oral BID    [Held by provider] NIFEdipine ER (PROCARDIA XL) tablet 90 mg  90 mg Oral DAILY    promethazine (PHENERGAN) injection 25 mg  25 mg IntraMUSCular Q6H PRN    hydrALAZINE (APRESOLINE) 20 mg/mL injection 10 mg  10 mg IntraVENous Q6H PRN    ondansetron (ZOFRAN) injection 4 mg  4 mg IntraVENous Q4H PRN    tamsulosin (FLOMAX) capsule 0.4 mg  0.4 mg Oral DAILY    latanoprost (XALATAN) 0.005 % ophthalmic solution 1 Drop  1 Drop Left Eye QHS    metoprolol tartrate (LOPRESSOR) tablet 50 mg  50 mg Oral BID    amLODIPine (NORVASC) tablet 10 mg  10 mg Oral DAILY    glucose chewable tablet 16 g  4 Tab Oral PRN    glucagon (GLUCAGEN) injection 1 mg  1 mg IntraMUSCular PRN    dextrose 10% infusion 125-250 mL  125-250 mL IntraVENous PRN    docusate sodium (COLACE) capsule 100 mg  100 mg Oral BID PRN    melatonin tablet 12 mg  12 mg Oral QHS PRN    albuterol-ipratropium (DUO-NEB) 2.5 MG-0.5 MG/3 ML  3 mL Nebulization Q4H PRN    acetaminophen (TYLENOL) tablet 650 mg  650 mg Oral Q6H PRN      Total time spent with patient: 35 minutes. Care Plan discussed with: Patient, Care Manager, Nursing Staff and >50% of time spent in counseling and coordination of care DW surgery     Discussed:  Care Plan    Prophylaxis:  SCD's    Disposition:  Home w/Family           This document in whole or part of it has been produced using voice recognition software. Unrecognized errors in transcription may be present.     Attending Physician: Kathy Lyn MD

## 2020-07-19 NOTE — PROGRESS NOTES
Problem: Falls - Risk of  Goal: *Absence of Falls  Description: Document Kirt Host Fall Risk and appropriate interventions in the flowsheet.   Outcome: Progressing Towards Goal  Note: Fall Risk Interventions:  Mobility Interventions: Patient to call before getting OOB         Medication Interventions: Bed/chair exit alarm, Patient to call before getting OOB    Elimination Interventions: Patient to call for help with toileting needs

## 2020-07-19 NOTE — PROGRESS NOTES
Problem: Diabetes Self-Management  Goal: *Disease process and treatment process  Description: Define diabetes and identify own type of diabetes; list 3 options for treating diabetes. Outcome: Progressing Towards Goal  Goal: *Incorporating nutritional management into lifestyle  Description: Describe effect of type, amount and timing of food on blood glucose; list 3 methods for planning meals. Outcome: Progressing Towards Goal  Goal: *Incorporating physical activity into lifestyle  Description: State effect of exercise on blood glucose levels. Outcome: Progressing Towards Goal  Goal: *Developing strategies to promote health/change behavior  Description: Define the ABC's of diabetes; identify appropriate screenings, schedule and personal plan for screenings. Outcome: Progressing Towards Goal  Goal: *Using medications safely  Description: State effect of diabetes medications on diabetes; name diabetes medication taking, action and side effects. Outcome: Progressing Towards Goal  Goal: *Monitoring blood glucose, interpreting and using results  Description: Identify recommended blood glucose targets  and personal targets. Outcome: Progressing Towards Goal  Goal: *Prevention, detection, treatment of acute complications  Description: List symptoms of hyper- and hypoglycemia; describe how to treat low blood sugar and actions for lowering  high blood glucose level. Outcome: Progressing Towards Goal  Goal: *Prevention, detection and treatment of chronic complications  Description: Define the natural course of diabetes and describe the relationship of blood glucose levels to long term complications of diabetes.   Outcome: Progressing Towards Goal  Goal: *Developing strategies to address psychosocial issues  Description: Describe feelings about living with diabetes; identify support needed and support network  Outcome: Progressing Towards Goal  Goal: *Insulin pump training  Outcome: Progressing Towards Goal  Goal: *Sick day guidelines  Outcome: Progressing Towards Goal  Goal: *Patient Specific Goal (EDIT GOAL, INSERT TEXT)  Outcome: Progressing Towards Goal     Problem: Patient Education: Go to Patient Education Activity  Goal: Patient/Family Education  Outcome: Progressing Towards Goal     Problem: Falls - Risk of  Goal: *Absence of Falls  Description: Document Lulu Pike Fall Risk and appropriate interventions in the flowsheet. Outcome: Progressing Towards Goal  Note: Fall Risk Interventions:  Mobility Interventions: Patient to call before getting OOB         Medication Interventions: Patient to call before getting OOB, Teach patient to arise slowly    Elimination Interventions: Patient to call for help with toileting needs, Stay With Me (per policy), Urinal in reach              Problem: Patient Education: Go to Patient Education Activity  Goal: Patient/Family Education  Outcome: Progressing Towards Goal     Problem: Discharge Planning  Goal: *Discharge to safe environment  Outcome: Progressing Towards Goal     Problem: Pressure Injury - Risk of  Goal: *Prevention of pressure injury  Description: Document Rai Scale and appropriate interventions in the flowsheet.   Outcome: Progressing Towards Goal  Note: Pressure Injury Interventions:  Sensory Interventions: Assess changes in LOC, Keep linens dry and wrinkle-free, Maintain/enhance activity level    Moisture Interventions: Absorbent underpads    Activity Interventions: Increase time out of bed, Pressure redistribution bed/mattress(bed type)    Mobility Interventions: Pressure redistribution bed/mattress (bed type), HOB 30 degrees or less    Nutrition Interventions: Discuss nutritional consult with provider                     Problem: Patient Education: Go to Patient Education Activity  Goal: Patient/Family Education  Outcome: Progressing Towards Goal     Problem: Nutrition Deficit  Goal: *Optimize nutritional status  Outcome: Progressing Towards Goal     Problem: Pain  Goal: *Control of Pain  Outcome: Progressing Towards Goal     Problem: Patient Education: Go to Patient Education Activity  Goal: Patient/Family Education  Outcome: Progressing Towards Goal

## 2020-07-20 LAB
ANION GAP SERPL CALC-SCNC: 5 MMOL/L (ref 3–18)
BASOPHILS # BLD: 0 K/UL (ref 0–0.1)
BASOPHILS NFR BLD: 0 % (ref 0–2)
BUN SERPL-MCNC: 10 MG/DL (ref 7–18)
BUN/CREAT SERPL: 13 (ref 12–20)
CALCIUM SERPL-MCNC: 7.3 MG/DL (ref 8.5–10.1)
CHLORIDE SERPL-SCNC: 107 MMOL/L (ref 100–111)
CO2 SERPL-SCNC: 25 MMOL/L (ref 21–32)
CREAT SERPL-MCNC: 0.76 MG/DL (ref 0.6–1.3)
DIFFERENTIAL METHOD BLD: ABNORMAL
EOSINOPHIL # BLD: 0.2 K/UL (ref 0–0.4)
EOSINOPHIL NFR BLD: 2 % (ref 0–5)
ERYTHROCYTE [DISTWIDTH] IN BLOOD BY AUTOMATED COUNT: 12.5 % (ref 11.6–14.5)
GLUCOSE BLD STRIP.AUTO-MCNC: 114 MG/DL (ref 70–110)
GLUCOSE BLD STRIP.AUTO-MCNC: 134 MG/DL (ref 70–110)
GLUCOSE BLD STRIP.AUTO-MCNC: 138 MG/DL (ref 70–110)
GLUCOSE BLD STRIP.AUTO-MCNC: 142 MG/DL (ref 70–110)
GLUCOSE SERPL-MCNC: 102 MG/DL (ref 74–99)
HCT VFR BLD AUTO: 30.6 % (ref 36–48)
HGB BLD-MCNC: 10.3 G/DL (ref 13–16)
LYMPHOCYTES # BLD: 1.3 K/UL (ref 0.9–3.6)
LYMPHOCYTES NFR BLD: 16 % (ref 21–52)
MAGNESIUM SERPL-MCNC: 1.6 MG/DL (ref 1.6–2.6)
MCH RBC QN AUTO: 31.4 PG (ref 24–34)
MCHC RBC AUTO-ENTMCNC: 33.7 G/DL (ref 31–37)
MCV RBC AUTO: 93.3 FL (ref 74–97)
MONOCYTES # BLD: 0.7 K/UL (ref 0.05–1.2)
MONOCYTES NFR BLD: 9 % (ref 3–10)
NEUTS SEG # BLD: 5.8 K/UL (ref 1.8–8)
NEUTS SEG NFR BLD: 73 % (ref 40–73)
PHOSPHATE SERPL-MCNC: 2.7 MG/DL (ref 2.5–4.9)
PLATELET # BLD AUTO: 181 K/UL (ref 135–420)
PMV BLD AUTO: 11.2 FL (ref 9.2–11.8)
POTASSIUM SERPL-SCNC: 3.8 MMOL/L (ref 3.5–5.5)
RBC # BLD AUTO: 3.28 M/UL (ref 4.7–5.5)
SODIUM SERPL-SCNC: 137 MMOL/L (ref 136–145)
WBC # BLD AUTO: 7.9 K/UL (ref 4.6–13.2)

## 2020-07-20 PROCEDURE — 82962 GLUCOSE BLOOD TEST: CPT

## 2020-07-20 PROCEDURE — 85025 COMPLETE CBC W/AUTO DIFF WBC: CPT

## 2020-07-20 PROCEDURE — 74011250636 HC RX REV CODE- 250/636: Performed by: INTERNAL MEDICINE

## 2020-07-20 PROCEDURE — 74011000258 HC RX REV CODE- 258: Performed by: INTERNAL MEDICINE

## 2020-07-20 PROCEDURE — 84100 ASSAY OF PHOSPHORUS: CPT

## 2020-07-20 PROCEDURE — 74011250637 HC RX REV CODE- 250/637: Performed by: SURGERY

## 2020-07-20 PROCEDURE — 74011636637 HC RX REV CODE- 636/637: Performed by: INTERNAL MEDICINE

## 2020-07-20 PROCEDURE — 80048 BASIC METABOLIC PNL TOTAL CA: CPT

## 2020-07-20 PROCEDURE — 74011250637 HC RX REV CODE- 250/637: Performed by: INTERNAL MEDICINE

## 2020-07-20 PROCEDURE — 36415 COLL VENOUS BLD VENIPUNCTURE: CPT

## 2020-07-20 PROCEDURE — 83735 ASSAY OF MAGNESIUM: CPT

## 2020-07-20 PROCEDURE — 74011250636 HC RX REV CODE- 250/636: Performed by: SURGERY

## 2020-07-20 PROCEDURE — 65270000029 HC RM PRIVATE

## 2020-07-20 PROCEDURE — C9113 INJ PANTOPRAZOLE SODIUM, VIA: HCPCS | Performed by: INTERNAL MEDICINE

## 2020-07-20 RX ORDER — DOCUSATE SODIUM 100 MG/1
100 CAPSULE, LIQUID FILLED ORAL 2 TIMES DAILY
Status: DISCONTINUED | OUTPATIENT
Start: 2020-07-20 | End: 2020-07-21 | Stop reason: HOSPADM

## 2020-07-20 RX ADMIN — TAMSULOSIN HYDROCHLORIDE 0.4 MG: 0.4 CAPSULE ORAL at 08:43

## 2020-07-20 RX ADMIN — AMLODIPINE BESYLATE 10 MG: 10 TABLET ORAL at 08:43

## 2020-07-20 RX ADMIN — PIPERACILLIN AND TAZOBACTAM 3.38 G: 3; .375 INJECTION, POWDER, LYOPHILIZED, FOR SOLUTION INTRAVENOUS at 18:01

## 2020-07-20 RX ADMIN — PANTOPRAZOLE SODIUM 40 MG: 40 INJECTION, POWDER, FOR SOLUTION INTRAVENOUS at 14:06

## 2020-07-20 RX ADMIN — PIPERACILLIN AND TAZOBACTAM 3.38 G: 3; .375 INJECTION, POWDER, LYOPHILIZED, FOR SOLUTION INTRAVENOUS at 08:43

## 2020-07-20 RX ADMIN — PIPERACILLIN AND TAZOBACTAM 3.38 G: 3; .375 INJECTION, POWDER, LYOPHILIZED, FOR SOLUTION INTRAVENOUS at 00:20

## 2020-07-20 RX ADMIN — CLONIDINE HYDROCHLORIDE 0.1 MG: 0.1 TABLET ORAL at 08:43

## 2020-07-20 RX ADMIN — LATANOPROST 1 DROP: 50 SOLUTION OPHTHALMIC at 21:51

## 2020-07-20 RX ADMIN — HYDROMORPHONE HYDROCHLORIDE 1 MG: 1 INJECTION, SOLUTION INTRAMUSCULAR; INTRAVENOUS; SUBCUTANEOUS at 21:51

## 2020-07-20 RX ADMIN — HYDROMORPHONE HYDROCHLORIDE 1 MG: 1 INJECTION, SOLUTION INTRAMUSCULAR; INTRAVENOUS; SUBCUTANEOUS at 11:22

## 2020-07-20 RX ADMIN — HYDROMORPHONE HYDROCHLORIDE 1 MG: 1 INJECTION, SOLUTION INTRAMUSCULAR; INTRAVENOUS; SUBCUTANEOUS at 06:56

## 2020-07-20 RX ADMIN — DOCUSATE SODIUM 100 MG: 100 CAPSULE, LIQUID FILLED ORAL at 08:45

## 2020-07-20 RX ADMIN — DOCUSATE SODIUM 100 MG: 100 CAPSULE, LIQUID FILLED ORAL at 17:56

## 2020-07-20 RX ADMIN — INSULIN GLARGINE 8 UNITS: 100 INJECTION, SOLUTION SUBCUTANEOUS at 12:05

## 2020-07-20 RX ADMIN — HYDROMORPHONE HYDROCHLORIDE 1 MG: 1 INJECTION, SOLUTION INTRAMUSCULAR; INTRAVENOUS; SUBCUTANEOUS at 17:58

## 2020-07-20 RX ADMIN — HYDROMORPHONE HYDROCHLORIDE 1 MG: 1 INJECTION, SOLUTION INTRAMUSCULAR; INTRAVENOUS; SUBCUTANEOUS at 04:35

## 2020-07-20 RX ADMIN — DOCUSATE SODIUM 100 MG: 100 CAPSULE, LIQUID FILLED ORAL at 08:43

## 2020-07-20 RX ADMIN — METOPROLOL TARTRATE 50 MG: 50 TABLET, FILM COATED ORAL at 17:57

## 2020-07-20 RX ADMIN — CLONIDINE HYDROCHLORIDE 0.1 MG: 0.1 TABLET ORAL at 17:57

## 2020-07-20 RX ADMIN — METOPROLOL TARTRATE 50 MG: 50 TABLET, FILM COATED ORAL at 08:43

## 2020-07-20 NOTE — PROGRESS NOTES
Nutrition Education    · Verbally reviewed information with Patient  · Educated on Fiber restricted diet with gradual advancement; CHO choices and plate method  · Written educational materials provided. · Contact name and number provided. · Refer to Patient Education activity for more details.     Electronically signed by Dorothy Nair on 7/20/2020 at 1:58 PM

## 2020-07-20 NOTE — ROUTINE PROCESS
Bedside and Verbal shift change report given to Omar Villa RN, BSN (oncoming nurse) by Michelle Vergara RN, BSN (offgoing nurse). Report included the following information SBAR, Kardex, ED Summary, OR Summary, Procedure Summary, Intake/Output, MAR and Recent Results.      Michelle Vergara RN, BSN

## 2020-07-20 NOTE — PROGRESS NOTES
Patient seen and examined. He is doing well. Tolerating clear liquid diet. Passing flatus but no bowel movement yet. His vital signs are normal.  His WBC and creatinine are normal.  His abdomen is soft and non-tender and non-distended. His midline wound is clean. Plan:  Advance diet as tolerated. I placed an order for soft GI diet  Stool softener  I decreased his IV fluids from 70 cc/h to 25 cc/h  Ambulation  DVT prophylaxis  Await the results of the lymph node biopsy  Possible discharge home today or tomorrow. If he had a bowel movement today I think it is okay to discharge home from my standpoint, if not probably is better to wait till tomorrow to make sure is tolerating a regular diet.

## 2020-07-20 NOTE — PROGRESS NOTES
Problem: Diabetes Self-Management  Goal: *Disease process and treatment process  Description: Define diabetes and identify own type of diabetes; list 3 options for treating diabetes. Outcome: Progressing Towards Goal  Goal: *Incorporating nutritional management into lifestyle  Description: Describe effect of type, amount and timing of food on blood glucose; list 3 methods for planning meals. Outcome: Progressing Towards Goal  Goal: *Incorporating physical activity into lifestyle  Description: State effect of exercise on blood glucose levels. Outcome: Progressing Towards Goal  Goal: *Developing strategies to promote health/change behavior  Description: Define the ABC's of diabetes; identify appropriate screenings, schedule and personal plan for screenings. Outcome: Progressing Towards Goal  Goal: *Using medications safely  Description: State effect of diabetes medications on diabetes; name diabetes medication taking, action and side effects. Outcome: Progressing Towards Goal  Goal: *Monitoring blood glucose, interpreting and using results  Description: Identify recommended blood glucose targets  and personal targets. Outcome: Progressing Towards Goal  Goal: *Prevention, detection, treatment of acute complications  Description: List symptoms of hyper- and hypoglycemia; describe how to treat low blood sugar and actions for lowering  high blood glucose level. Outcome: Progressing Towards Goal  Goal: *Prevention, detection and treatment of chronic complications  Description: Define the natural course of diabetes and describe the relationship of blood glucose levels to long term complications of diabetes.   Outcome: Progressing Towards Goal  Goal: *Developing strategies to address psychosocial issues  Description: Describe feelings about living with diabetes; identify support needed and support network  Outcome: Progressing Towards Goal  Goal: *Insulin pump training  Outcome: Progressing Towards Goal  Goal: *Sick day guidelines  Outcome: Progressing Towards Goal  Goal: *Patient Specific Goal (EDIT GOAL, INSERT TEXT)  Outcome: Progressing Towards Goal     Problem: Patient Education: Go to Patient Education Activity  Goal: Patient/Family Education  Outcome: Progressing Towards Goal     Problem: Falls - Risk of  Goal: *Absence of Falls  Description: Document Edwin Rowland Fall Risk and appropriate interventions in the flowsheet. Outcome: Progressing Towards Goal  Note: Fall Risk Interventions:  Mobility Interventions: Assess mobility with egress test, Patient to call before getting OOB, Utilize walker, cane, or other assistive device         Medication Interventions: Teach patient to arise slowly, Patient to call before getting OOB    Elimination Interventions: Call light in reach, Urinal in reach, Patient to call for help with toileting needs              Problem: Patient Education: Go to Patient Education Activity  Goal: Patient/Family Education  Outcome: Progressing Towards Goal     Problem: Discharge Planning  Goal: *Discharge to safe environment  Outcome: Progressing Towards Goal     Problem: Pressure Injury - Risk of  Goal: *Prevention of pressure injury  Description: Document Rai Scale and appropriate interventions in the flowsheet.   Outcome: Progressing Towards Goal  Note: Pressure Injury Interventions:  Sensory Interventions: Assess changes in LOC, Keep linens dry and wrinkle-free, Maintain/enhance activity level, Minimize linen layers    Moisture Interventions: Absorbent underpads    Activity Interventions: Increase time out of bed, Pressure redistribution bed/mattress(bed type)    Mobility Interventions: HOB 30 degrees or less, Pressure redistribution bed/mattress (bed type)    Nutrition Interventions: Document food/fluid/supplement intake, Discuss nutritional consult with provider, Offer support with meals,snacks and hydration                     Problem: Patient Education: Go to Patient Education Activity  Goal: Patient/Family Education  Outcome: Progressing Towards Goal     Problem: Nutrition Deficit  Goal: *Optimize nutritional status  Outcome: Progressing Towards Goal     Problem: Pain  Goal: *Control of Pain  Outcome: Progressing Towards Goal     Problem: Patient Education: Go to Patient Education Activity  Goal: Patient/Family Education  Outcome: Progressing Towards Goal

## 2020-07-20 NOTE — PROGRESS NOTES
Internal Medicine Progress Note    Patient's Name: Whit Miranda Date: 7/10/2020  Length of Stay: 9      Assessment/Plan     Active Hospital Problems    Diagnosis Date Noted    Hyponatremia 07/11/2020    Hyperglycemia 07/11/2020    Stage 3 acute kidney injury ([facility] [address] 07/11/2020    Community acquired pneumonia of right middle lobe of lung 07/11/2020    Former smoker 07/11/2020    BPH (benign prostatic hyperplasia) 07/11/2020    Benign hypertension     Essential hypertension 06/21/2013    Type II diabetes mellitus ([facility] [address] 06/21/2013     - Appreciate surg  - Cont current soft GI diet  - Stool softener  - Cont low flow IVFs  - F/u lymph node biopsy  - Afebrile, normal white count  - Cont IV zosyn for now, likely transition to oral treva  - Cr WNL  - Cont acceptable home medications for chronic conditions   - DVT protocol    I have personally reviewed all pertinent labs and films that have officially resulted over the last 24 hours. I have personally checked for all pending labs that are awaiting final results.     Subjective     Pt s/e @ bedside  No major events overnight  Abd pain tolerable  Denies CP or SOB    Objective     Visit Vitals  /72 (BP 1 Location: Right arm, BP Patient Position: At rest)   Pulse 62   Temp 98.4 °F (36.9 °C)   Resp 18   Ht 5' 10\" (1.778 m)   Wt 81.7 kg (180 lb 3.2 oz)   SpO2 98%   BMI 25.86 kg/m²       Physical Exam:  General Appearance: NAD, conversant  Lungs: CTA with normal respiratory effort  CV: RRR, no m/r/g  Abdomen: soft, approp TTP, normal bowel sounds  Extremities: no cyanosis, no peripheral edema  Neuro: No focal deficits, motor/sensory intact    Lab/Data Reviewed:  BMP:   Lab Results   Component Value Date/Time     07/20/2020 04:15 AM    K 3.8 07/20/2020 04:15 AM     07/20/2020 04:15 AM    CO2 25 07/20/2020 04:15 AM    AGAP 5 07/20/2020 04:15 AM     (H) 07/20/2020 04:15 AM    BUN 10 07/20/2020 04:15 AM    CREA 0.76 07/20/2020 04:15 AM    GFRAA >60 07/20/2020 04:15 AM    GFRNA >60 07/20/2020 04:15 AM     CBC:   Lab Results   Component Value Date/Time    WBC 7.9 07/20/2020 04:15 AM    HGB 10.3 (L) 07/20/2020 04:15 AM    HCT 30.6 (L) 07/20/2020 04:15 AM     07/20/2020 04:15 AM       Imaging Reviewed:  No results found.     Medications Reviewed:  Current Facility-Administered Medications   Medication Dose Route Frequency    docusate sodium (COLACE) capsule 100 mg  100 mg Oral BID    insulin lispro (HUMALOG) injection   SubCUTAneous AC&HS    HYDROmorphone (DILAUDID) injection 1 mg  1 mg IntraVENous Q2H PRN    piperacillin-tazobactam (ZOSYN) 3.375 g in 0.9% sodium chloride (MBP/ADV) 100 mL MBP \"EXTENDED 4 HOUR INFUSION ###  3.375 g IntraVENous Q8H    benzocaine-menthoL (CEPACOL) lozenge 1 Lozenge  1 Lozenge Mucous Membrane Q2.5H PRN    insulin glargine (LANTUS) injection 8 Units  8 Units SubCUTAneous DAILY    dextrose 5% with KCl 20 mEq/L infusion   IntraVENous CONTINUOUS    pantoprazole (PROTONIX) injection 40 mg  40 mg IntraVENous Q24H    cloNIDine HCL (CATAPRES) tablet 0.1 mg  0.1 mg Oral BID    [Held by provider] NIFEdipine ER (PROCARDIA XL) tablet 90 mg  90 mg Oral DAILY    promethazine (PHENERGAN) injection 25 mg  25 mg IntraMUSCular Q6H PRN    hydrALAZINE (APRESOLINE) 20 mg/mL injection 10 mg  10 mg IntraVENous Q6H PRN    ondansetron (ZOFRAN) injection 4 mg  4 mg IntraVENous Q4H PRN    tamsulosin (FLOMAX) capsule 0.4 mg  0.4 mg Oral DAILY    latanoprost (XALATAN) 0.005 % ophthalmic solution 1 Drop  1 Drop Left Eye QHS    metoprolol tartrate (LOPRESSOR) tablet 50 mg  50 mg Oral BID    amLODIPine (NORVASC) tablet 10 mg  10 mg Oral DAILY    glucose chewable tablet 16 g  4 Tab Oral PRN    glucagon (GLUCAGEN) injection 1 mg  1 mg IntraMUSCular PRN    dextrose 10% infusion 125-250 mL  125-250 mL IntraVENous PRN    docusate sodium (COLACE) capsule 100 mg  100 mg Oral BID PRN    melatonin tablet 12 mg  12 mg Oral QHS PRN    albuterol-ipratropium (DUO-NEB) 2.5 MG-0.5 MG/3 ML  3 mL Nebulization Q4H PRN    acetaminophen (TYLENOL) tablet 650 mg  650 mg Oral Q6H PRN           Theresa Collins DO  Internal Medicine, Hospitalist  P[facility]: [facility]

## 2020-07-20 NOTE — PROGRESS NOTES
0800  Received pt on bed, encourage IS, pain under control at this time. 1000 up walking in the room, per pt burp and passing gas several times. 1300  Pain under control, no nausea. 1600  Up  Sitting on the recliner. 1800  Tolerated his diet no nausea, had small loose stool, medicated for pain.

## 2020-07-21 VITALS
DIASTOLIC BLOOD PRESSURE: 75 MMHG | OXYGEN SATURATION: 98 % | BODY MASS INDEX: 26.18 KG/M2 | SYSTOLIC BLOOD PRESSURE: 127 MMHG | RESPIRATION RATE: 18 BRPM | HEART RATE: 73 BPM | WEIGHT: 182.9 LBS | HEIGHT: 70 IN | TEMPERATURE: 98.1 F

## 2020-07-21 LAB — GLUCOSE BLD STRIP.AUTO-MCNC: 139 MG/DL (ref 70–110)

## 2020-07-21 PROCEDURE — 74011000258 HC RX REV CODE- 258: Performed by: INTERNAL MEDICINE

## 2020-07-21 PROCEDURE — 74011250636 HC RX REV CODE- 250/636: Performed by: SURGERY

## 2020-07-21 PROCEDURE — 74011250637 HC RX REV CODE- 250/637: Performed by: SURGERY

## 2020-07-21 PROCEDURE — 82962 GLUCOSE BLOOD TEST: CPT

## 2020-07-21 PROCEDURE — 74011250636 HC RX REV CODE- 250/636: Performed by: INTERNAL MEDICINE

## 2020-07-21 PROCEDURE — 74011636637 HC RX REV CODE- 636/637: Performed by: INTERNAL MEDICINE

## 2020-07-21 PROCEDURE — 74011250637 HC RX REV CODE- 250/637: Performed by: INTERNAL MEDICINE

## 2020-07-21 RX ORDER — METOPROLOL TARTRATE 50 MG/1
50 TABLET ORAL 2 TIMES DAILY
Qty: 60 TAB | Refills: 0 | Status: SHIPPED | OUTPATIENT
Start: 2020-07-21 | End: 2022-07-19

## 2020-07-21 RX ORDER — OXYCODONE AND ACETAMINOPHEN 5; 325 MG/1; MG/1
1 TABLET ORAL
Qty: 12 TAB | Refills: 0 | Status: SHIPPED | OUTPATIENT
Start: 2020-07-21 | End: 2020-07-24

## 2020-07-21 RX ORDER — AMOXICILLIN AND CLAVULANATE POTASSIUM 875; 125 MG/1; MG/1
1 TABLET, FILM COATED ORAL EVERY 12 HOURS
Qty: 10 TAB | Refills: 0 | Status: SHIPPED | OUTPATIENT
Start: 2020-07-21 | End: 2020-07-29

## 2020-07-21 RX ORDER — INSULIN GLARGINE 100 [IU]/ML
8 INJECTION, SOLUTION SUBCUTANEOUS DAILY
Qty: 1 VIAL | Refills: 0 | Status: SHIPPED
Start: 2020-07-21 | End: 2022-07-19

## 2020-07-21 RX ADMIN — METOPROLOL TARTRATE 50 MG: 50 TABLET, FILM COATED ORAL at 08:53

## 2020-07-21 RX ADMIN — TAMSULOSIN HYDROCHLORIDE 0.4 MG: 0.4 CAPSULE ORAL at 08:53

## 2020-07-21 RX ADMIN — INSULIN GLARGINE 8 UNITS: 100 INJECTION, SOLUTION SUBCUTANEOUS at 08:54

## 2020-07-21 RX ADMIN — DOCUSATE SODIUM 100 MG: 100 CAPSULE, LIQUID FILLED ORAL at 08:53

## 2020-07-21 RX ADMIN — HYDROMORPHONE HYDROCHLORIDE 1 MG: 1 INJECTION, SOLUTION INTRAMUSCULAR; INTRAVENOUS; SUBCUTANEOUS at 08:00

## 2020-07-21 RX ADMIN — CLONIDINE HYDROCHLORIDE 0.1 MG: 0.1 TABLET ORAL at 08:53

## 2020-07-21 RX ADMIN — AMLODIPINE BESYLATE 10 MG: 10 TABLET ORAL at 08:53

## 2020-07-21 RX ADMIN — PIPERACILLIN AND TAZOBACTAM 3.38 G: 3; .375 INJECTION, POWDER, LYOPHILIZED, FOR SOLUTION INTRAVENOUS at 07:47

## 2020-07-21 RX ADMIN — PIPERACILLIN AND TAZOBACTAM 3.38 G: 3; .375 INJECTION, POWDER, LYOPHILIZED, FOR SOLUTION INTRAVENOUS at 01:00

## 2020-07-21 NOTE — ROUTINE PROCESS
Bedside and Verbal shift change report given to Joseph Tatum (oncoming nurse) by Mackenzie Ward RN, BSN (offgoing nurse). Report included the following information SBAR, Kardex, ED Summary, OR Summary, Procedure Summary, Intake/Output, MAR and Recent Results.      Mackenzie Ward, DIMPLE, BSN

## 2020-07-21 NOTE — PROGRESS NOTES
Problem: Diabetes Self-Management  Goal: *Disease process and treatment process  Description: Define diabetes and identify own type of diabetes; list 3 options for treating diabetes. Outcome: Progressing Towards Goal  Goal: *Incorporating nutritional management into lifestyle  Description: Describe effect of type, amount and timing of food on blood glucose; list 3 methods for planning meals. Outcome: Progressing Towards Goal  Goal: *Incorporating physical activity into lifestyle  Description: State effect of exercise on blood glucose levels. Outcome: Progressing Towards Goal  Goal: *Developing strategies to promote health/change behavior  Description: Define the ABC's of diabetes; identify appropriate screenings, schedule and personal plan for screenings. Outcome: Progressing Towards Goal  Goal: *Using medications safely  Description: State effect of diabetes medications on diabetes; name diabetes medication taking, action and side effects. Outcome: Progressing Towards Goal  Goal: *Monitoring blood glucose, interpreting and using results  Description: Identify recommended blood glucose targets  and personal targets. Outcome: Progressing Towards Goal  Goal: *Prevention, detection, treatment of acute complications  Description: List symptoms of hyper- and hypoglycemia; describe how to treat low blood sugar and actions for lowering  high blood glucose level. Outcome: Progressing Towards Goal  Goal: *Prevention, detection and treatment of chronic complications  Description: Define the natural course of diabetes and describe the relationship of blood glucose levels to long term complications of diabetes.   Outcome: Progressing Towards Goal  Goal: *Developing strategies to address psychosocial issues  Description: Describe feelings about living with diabetes; identify support needed and support network  Outcome: Progressing Towards Goal  Goal: *Insulin pump training  Outcome: Progressing Towards Goal  Goal: *Sick day guidelines  Outcome: Progressing Towards Goal  Goal: *Patient Specific Goal (EDIT GOAL, INSERT TEXT)  Outcome: Progressing Towards Goal     Problem: Patient Education: Go to Patient Education Activity  Goal: Patient/Family Education  Outcome: Progressing Towards Goal     Problem: Falls - Risk of  Goal: *Absence of Falls  Description: Document Jace Dowling Fall Risk and appropriate interventions in the flowsheet. Outcome: Progressing Towards Goal  Note: Fall Risk Interventions:  Mobility Interventions: Patient to call before getting OOB         Medication Interventions: Teach patient to arise slowly    Elimination Interventions: Call light in reach, Urinal in reach              Problem: Patient Education: Go to Patient Education Activity  Goal: Patient/Family Education  Outcome: Progressing Towards Goal     Problem: Discharge Planning  Goal: *Discharge to safe environment  Outcome: Progressing Towards Goal     Problem: Pressure Injury - Risk of  Goal: *Prevention of pressure injury  Description: Document Rai Scale and appropriate interventions in the flowsheet.   Outcome: Progressing Towards Goal  Note: Pressure Injury Interventions:  Sensory Interventions: Keep linens dry and wrinkle-free    Moisture Interventions: Absorbent underpads    Activity Interventions: Pressure redistribution bed/mattress(bed type)    Mobility Interventions: Pressure redistribution bed/mattress (bed type)    Nutrition Interventions: Document food/fluid/supplement intake, Offer support with meals,snacks and hydration                     Problem: Patient Education: Go to Patient Education Activity  Goal: Patient/Family Education  Outcome: Progressing Towards Goal     Problem: Nutrition Deficit  Goal: *Optimize nutritional status  Outcome: Progressing Towards Goal     Problem: Pain  Goal: *Control of Pain  Outcome: Progressing Towards Goal     Problem: Patient Education: Go to Patient Education Activity  Goal: Patient/Family Education  Outcome: Progressing Towards Goal

## 2020-07-21 NOTE — PROGRESS NOTES
Patient seen and examined. He is doing great. Tolerating regular diet. Passing flatus and had couple bowel movements. His vital signs are normal.  His abdomen is soft and non-tender and non-distended. His midline wound is clean.     Plan:  Discharge home from surgical standpoint  Follow up with me in couple weeks. My discharge instructions are written  Await the results of the lymph node biopsy  Appreciate medicine management.

## 2020-07-21 NOTE — DISCHARGE INSTRUCTIONS
Discharge Instructions Following Surgery    Patient: Bhavik Melgoza MRN: [id number]  SSN: [id number]    YOB: 1955  Age: 77 y.o. Sex: male      Activity  · As tolerated, walking encourage, stairs are okay. · Avoid strenuous activities - no lifting anything heavier than 15 pounds till seen in the clinic. · You may shower at home    Diet  · Regular diet    Pain  · Take pain medication as directed by your doctor. · Call your doctor if pain is NOT relieved by medication. Call your doctor if  · Excessive bleeding that does not stop after holding mild pressure over the area. · Temperature of 101 degrees F or above. · Redness,excessive swelling or bruising, and/or green or yellow, smelly discharge from incision. · If nausea and vomiting continues. Appointment date/time Follow-Up Phone Calls    · Call the office at [phone] to make your follow-up appointment in 2 weeks after the surgery (if not already set up) . Dr. Keerthi Carter cell phone number is [phone]. Please call me if you have any concerns or questions. DISCHARGE SUMMARY from Nurse    PATIENT INSTRUCTIONS:    After general anesthesia or intravenous sedation, for 24 hours or while taking prescription Narcotics:  · Limit your activities  · Do not drive and operate hazardous machinery  · Do not make important personal or business decisions  · Do  not drink alcoholic beverages  · If you have not urinated within 8 hours after discharge, please contact your surgeon on call.     Report the following to your surgeon:  · Excessive pain, swelling, redness or odor of or around the surgical area  · Temperature over 100.5  · Nausea and vomiting lasting longer than 4 hours or if unable to take medications  · Any signs of decreased circulation or nerve impairment to extremity: change in color, persistent  numbness, tingling, coldness or increase pain  · Any questions    What to do at Home:  Recommended activity: as physician advised    If you experience any of the following symptoms listed under Warning Signs of a Heart Attack, or Signs and Symptoms of a Stroke, please follow up with your primary care physician and/or call 911. .    *  Please give a list of your current medications to your Primary Care Provider. *  Please update this list whenever your medications are discontinued, doses are      changed, or new medications (including over-the-counter products) are added. *  Please carry medication information at all times in case of emergency situations. These are general instructions for a healthy lifestyle:    No smoking/ No tobacco products/ Avoid exposure to second hand smoke  Surgeon General's Warning:  Quitting smoking now greatly reduces serious risk to your health. Obesity, smoking, and sedentary lifestyle greatly increases your risk for illness    A healthy diet, regular physical exercise & weight monitoring are important for maintaining a healthy lifestyle    You may be retaining fluid if you have a history of heart failure or if you experience any of the following symptoms:  Weight gain of 3 pounds or more overnight or 5 pounds in a week, increased swelling in our hands or feet or shortness of breath while lying flat in bed. Please call your doctor as soon as you notice any of these symptoms; do not wait until your next office visit. Patient armband removed and shredded. The discharge information has been reviewed with the patient. The patient verbalized understanding. Discharge medications reviewed with the patient and appropriate educational materials and side effects teaching were provided.   ____________________  _______________________________________________________________________________________________________________

## 2020-07-21 NOTE — PROGRESS NOTES
Discharge/Transition Planning  Problem: Discharge Planning  Goal: *Discharge to safe environment  Outcome: resolved/metl    Pt dc'd home, no services ordered.      Care Management Interventions  PCP Verified by CM: Yes(Randal Watson)  Last Visit to PCP: 07/03/20  Mode of Transport at Discharge: ALS(Medicaid cab or LYFT)  Transition of Care Consult (CM Consult): Discharge Planning  Current Support Network: Lives with Spouse  Confirm Follow Up Transport: Self  The Patient and/or Patient Representative was Provided with a Choice of Provider and Agrees with the Discharge Plan?: No  Freedom of Choice List was Provided with Basic Dialogue that Supports the Patient's Individualized Plan of Care/Goals, Treatment Preferences and Shares the Quality Data Associated with the Providers?: No  Discharge Location  Discharge Placement: Home
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
## 2020-07-22 ENCOUNTER — TELEPHONE (OUTPATIENT)
Dept: CASE MANAGEMENT | Age: 65
End: 2020-07-22

## 2020-07-22 NOTE — TELEPHONE ENCOUNTER
Patient contacted regarding recent discharge and COVID-19 risk   Care Coordinator contacted the patient by telephone to perform post discharge assessment. Verified name and  with patient as identifiers. Patient has following risk factors of: diabetes. Care Coordinator reviewed discharge instructions, medical action plan and red flags related to discharge diagnosis. Reviewed and educated them on any new and changed medications related to discharge diagnosis. Advised obtaining a 90-day supply of all daily and as-needed medications. Education provided regarding infection prevention, and signs and symptoms of COVID-19 and when to seek medical attention with patient who verbalized understanding. Discussed exposure protocols and quarantine from [address] you at higher risk for severe illness  and given an opportunity for questions and concerns. The patient agrees to contact the COVID-19 hotline [phone] or PCP office for questions related to their healthcare. Care Coordinator provided contact information for future reference. From CDC: Are you at higher risk for severe illness?  Wash your hands often.  Avoid close contact (6 feet, which is about two arm lengths) with people who are sick.  Put distance between yourself and other people if COVID-19 is spreading in your community.  Clean and disinfect frequently touched surfaces.  Avoid all cruise travel and non-essential air travel.  Call your healthcare professional if you have concerns about COVID-19 and your underlying condition or if you are sick. For more information on steps you can take to protect yourself, see CDC's How to Protect Yourself      Patient/family/caregiver given information for [employer] Loop and agrees to enroll no  Patient's preferred e-mail:    Patient's preferred phone number:   Based on Loop alert triggers, patient will be contacted by nurse care manager for worsening symptoms.     Plan for follow-up call in 5-7 days based on severity of symptoms and risk factors.

## 2020-07-27 ENCOUNTER — APPOINTMENT (OUTPATIENT)
Dept: CT IMAGING | Age: 65
DRG: 247 | End: 2020-07-27
Attending: EMERGENCY MEDICINE
Payer: MEDICAID

## 2020-07-27 ENCOUNTER — APPOINTMENT (OUTPATIENT)
Dept: GENERAL RADIOLOGY | Age: 65
DRG: 247 | End: 2020-07-27
Attending: EMERGENCY MEDICINE
Payer: MEDICAID

## 2020-07-27 ENCOUNTER — HOSPITAL ENCOUNTER (INPATIENT)
Age: 65
LOS: 2 days | Discharge: HOME | DRG: 247 | End: 2020-07-29
Attending: EMERGENCY MEDICINE | Admitting: HOSPITALIST
Payer: MEDICAID

## 2020-07-27 DIAGNOSIS — K91.30 SMALL BOWEL OBSTRUCTION DUE TO POSTOPERATIVE ADHESIONS: Primary | ICD-10-CM

## 2020-07-27 DIAGNOSIS — D64.9 ANEMIA, UNSPECIFIED TYPE: ICD-10-CM

## 2020-07-27 DIAGNOSIS — K56.7 ILEUS (HCC): ICD-10-CM

## 2020-07-27 DIAGNOSIS — E83.51 HYPOCALCEMIA: ICD-10-CM

## 2020-07-27 DIAGNOSIS — K56.609 SBO (SMALL BOWEL OBSTRUCTION) (HCC): ICD-10-CM

## 2020-07-27 DIAGNOSIS — N28.9 RENAL INSUFFICIENCY: ICD-10-CM

## 2020-07-27 PROBLEM — I10 HTN (HYPERTENSION): Status: ACTIVE | Noted: 2020-07-27

## 2020-07-27 PROBLEM — E11.9 DIABETES (HCC): Status: ACTIVE | Noted: 2020-07-27

## 2020-07-27 PROBLEM — N17.9 AKI (ACUTE KIDNEY INJURY) (HCC): Status: ACTIVE | Noted: 2020-07-27

## 2020-07-27 LAB
ALBUMIN SERPL-MCNC: 2.5 G/DL (ref 3.4–5)
ALBUMIN/GLOB SERPL: 0.5 {RATIO} (ref 0.8–1.7)
ALP SERPL-CCNC: 98 U/L (ref 45–117)
ALT SERPL-CCNC: 34 U/L (ref 16–61)
ANION GAP SERPL CALC-SCNC: 6 MMOL/L (ref 3–18)
AST SERPL-CCNC: 27 U/L (ref 10–38)
BASOPHILS # BLD: 0 K/UL (ref 0–0.1)
BASOPHILS NFR BLD: 0 % (ref 0–2)
BILIRUB SERPL-MCNC: 1.4 MG/DL (ref 0.2–1)
BNP SERPL-MCNC: 223 PG/ML (ref 0–900)
BUN SERPL-MCNC: 23 MG/DL (ref 7–18)
BUN/CREAT SERPL: 13 (ref 12–20)
CALCIUM SERPL-MCNC: 8 MG/DL (ref 8.5–10.1)
CHLORIDE SERPL-SCNC: 106 MMOL/L (ref 100–111)
CO2 SERPL-SCNC: 25 MMOL/L (ref 21–32)
CREAT SERPL-MCNC: 1.79 MG/DL (ref 0.6–1.3)
DIFFERENTIAL METHOD BLD: ABNORMAL
EOSINOPHIL # BLD: 0.1 K/UL (ref 0–0.4)
EOSINOPHIL NFR BLD: 2 % (ref 0–5)
ERYTHROCYTE [DISTWIDTH] IN BLOOD BY AUTOMATED COUNT: 13 % (ref 11.6–14.5)
EST. AVERAGE GLUCOSE BLD GHB EST-MCNC: 157 MG/DL
GLOBULIN SER CALC-MCNC: 4.7 G/DL (ref 2–4)
GLUCOSE BLD STRIP.AUTO-MCNC: 153 MG/DL (ref 70–110)
GLUCOSE BLD STRIP.AUTO-MCNC: 175 MG/DL (ref 70–110)
GLUCOSE SERPL-MCNC: 114 MG/DL (ref 74–99)
HBA1C MFR BLD: 7.1 % (ref 4.2–5.6)
HCT VFR BLD AUTO: 31.3 % (ref 36–48)
HGB BLD-MCNC: 10.9 G/DL (ref 13–16)
INR PPP: 1.1 (ref 0.8–1.2)
LYMPHOCYTES # BLD: 1.4 K/UL (ref 0.9–3.6)
LYMPHOCYTES NFR BLD: 23 % (ref 21–52)
MAGNESIUM SERPL-MCNC: 1.2 MG/DL (ref 1.6–2.6)
MCH RBC QN AUTO: 32.4 PG (ref 24–34)
MCHC RBC AUTO-ENTMCNC: 34.8 G/DL (ref 31–37)
MCV RBC AUTO: 93.2 FL (ref 74–97)
MONOCYTES # BLD: 1 K/UL (ref 0.05–1.2)
MONOCYTES NFR BLD: 15 % (ref 3–10)
NEUTS SEG # BLD: 3.7 K/UL (ref 1.8–8)
NEUTS SEG NFR BLD: 60 % (ref 40–73)
PHOSPHATE SERPL-MCNC: 4 MG/DL (ref 2.5–4.9)
PLATELET # BLD AUTO: 319 K/UL (ref 135–420)
PMV BLD AUTO: 10.3 FL (ref 9.2–11.8)
POTASSIUM SERPL-SCNC: 4 MMOL/L (ref 3.5–5.5)
PROT SERPL-MCNC: 7.2 G/DL (ref 6.4–8.2)
PROTHROMBIN TIME: 14.4 SEC (ref 11.5–15.2)
RBC # BLD AUTO: 3.36 M/UL (ref 4.7–5.5)
SODIUM SERPL-SCNC: 137 MMOL/L (ref 136–145)
TROPONIN I SERPL-MCNC: <0.02 NG/ML (ref 0–0.04)
WBC # BLD AUTO: 6.2 K/UL (ref 4.6–13.2)

## 2020-07-27 PROCEDURE — 74011636637 HC RX REV CODE- 636/637: Performed by: HOSPITALIST

## 2020-07-27 PROCEDURE — 99284 EMERGENCY DEPT VISIT MOD MDM: CPT

## 2020-07-27 PROCEDURE — 82962 GLUCOSE BLOOD TEST: CPT

## 2020-07-27 PROCEDURE — 74018 RADEX ABDOMEN 1 VIEW: CPT

## 2020-07-27 PROCEDURE — 85025 COMPLETE CBC W/AUTO DIFF WBC: CPT

## 2020-07-27 PROCEDURE — 74011000258 HC RX REV CODE- 258: Performed by: PHYSICIAN ASSISTANT

## 2020-07-27 PROCEDURE — 74177 CT ABD & PELVIS W/CONTRAST: CPT

## 2020-07-27 PROCEDURE — 74011250636 HC RX REV CODE- 250/636: Performed by: PHYSICIAN ASSISTANT

## 2020-07-27 PROCEDURE — 84484 ASSAY OF TROPONIN QUANT: CPT

## 2020-07-27 PROCEDURE — 74011636320 HC RX REV CODE- 636/320: Performed by: EMERGENCY MEDICINE

## 2020-07-27 PROCEDURE — 83036 HEMOGLOBIN GLYCOSYLATED A1C: CPT

## 2020-07-27 PROCEDURE — 74011250636 HC RX REV CODE- 250/636: Performed by: EMERGENCY MEDICINE

## 2020-07-27 PROCEDURE — 0D9670Z DRAINAGE OF STOMACH WITH DRAINAGE DEVICE, VIA NATURAL OR ARTIFICIAL OPENING: ICD-10-PCS | Performed by: STUDENT IN AN ORGANIZED HEALTH CARE EDUCATION/TRAINING PROGRAM

## 2020-07-27 PROCEDURE — 80053 COMPREHEN METABOLIC PANEL: CPT

## 2020-07-27 PROCEDURE — 84100 ASSAY OF PHOSPHORUS: CPT

## 2020-07-27 PROCEDURE — 65270000029 HC RM PRIVATE

## 2020-07-27 PROCEDURE — 83735 ASSAY OF MAGNESIUM: CPT

## 2020-07-27 PROCEDURE — 96360 HYDRATION IV INFUSION INIT: CPT

## 2020-07-27 PROCEDURE — 85610 PROTHROMBIN TIME: CPT

## 2020-07-27 PROCEDURE — 83880 ASSAY OF NATRIURETIC PEPTIDE: CPT

## 2020-07-27 PROCEDURE — 74011250636 HC RX REV CODE- 250/636: Performed by: HOSPITALIST

## 2020-07-27 RX ORDER — POLYETHYLENE GLYCOL 3350 17 G/17G
17 POWDER, FOR SOLUTION ORAL DAILY PRN
Status: DISCONTINUED | OUTPATIENT
Start: 2020-07-27 | End: 2020-07-29 | Stop reason: HOSPADM

## 2020-07-27 RX ORDER — ONDANSETRON 2 MG/ML
4 INJECTION INTRAMUSCULAR; INTRAVENOUS
Status: DISCONTINUED | OUTPATIENT
Start: 2020-07-27 | End: 2020-07-29 | Stop reason: HOSPADM

## 2020-07-27 RX ORDER — MAGNESIUM SULFATE 100 %
4 CRYSTALS MISCELLANEOUS AS NEEDED
Status: DISCONTINUED | OUTPATIENT
Start: 2020-07-27 | End: 2020-07-29 | Stop reason: HOSPADM

## 2020-07-27 RX ORDER — DEXTROSE MONOHYDRATE AND SODIUM CHLORIDE 5; .9 G/100ML; G/100ML
75 INJECTION, SOLUTION INTRAVENOUS CONTINUOUS
Status: DISCONTINUED | OUTPATIENT
Start: 2020-07-27 | End: 2020-07-28

## 2020-07-27 RX ORDER — ACETAMINOPHEN 650 MG/1
650 SUPPOSITORY RECTAL
Status: DISCONTINUED | OUTPATIENT
Start: 2020-07-27 | End: 2020-07-29 | Stop reason: HOSPADM

## 2020-07-27 RX ORDER — DEXTROSE, SODIUM CHLORIDE, AND POTASSIUM CHLORIDE 5; .45; .15 G/100ML; G/100ML; G/100ML
125 INJECTION INTRAVENOUS CONTINUOUS
Status: DISCONTINUED | OUTPATIENT
Start: 2020-07-27 | End: 2020-07-27

## 2020-07-27 RX ORDER — ACETAMINOPHEN 325 MG/1
650 TABLET ORAL
Status: DISCONTINUED | OUTPATIENT
Start: 2020-07-27 | End: 2020-07-29 | Stop reason: HOSPADM

## 2020-07-27 RX ORDER — HYDRALAZINE HYDROCHLORIDE 20 MG/ML
10 INJECTION INTRAMUSCULAR; INTRAVENOUS
Status: DISCONTINUED | OUTPATIENT
Start: 2020-07-27 | End: 2020-07-29

## 2020-07-27 RX ORDER — SODIUM CHLORIDE 0.9 % (FLUSH) 0.9 %
5-40 SYRINGE (ML) INJECTION EVERY 8 HOURS
Status: DISCONTINUED | OUTPATIENT
Start: 2020-07-27 | End: 2020-07-29 | Stop reason: HOSPADM

## 2020-07-27 RX ORDER — NALOXONE HYDROCHLORIDE 0.4 MG/ML
0.4 INJECTION, SOLUTION INTRAMUSCULAR; INTRAVENOUS; SUBCUTANEOUS AS NEEDED
Status: DISCONTINUED | OUTPATIENT
Start: 2020-07-27 | End: 2020-07-29 | Stop reason: HOSPADM

## 2020-07-27 RX ORDER — SODIUM CHLORIDE 0.9 % (FLUSH) 0.9 %
5-40 SYRINGE (ML) INJECTION AS NEEDED
Status: DISCONTINUED | OUTPATIENT
Start: 2020-07-27 | End: 2020-07-29 | Stop reason: HOSPADM

## 2020-07-27 RX ORDER — MORPHINE SULFATE 2 MG/ML
1 INJECTION, SOLUTION INTRAMUSCULAR; INTRAVENOUS
Status: DISCONTINUED | OUTPATIENT
Start: 2020-07-27 | End: 2020-07-29 | Stop reason: HOSPADM

## 2020-07-27 RX ORDER — INSULIN LISPRO 100 [IU]/ML
INJECTION, SOLUTION INTRAVENOUS; SUBCUTANEOUS
Status: DISCONTINUED | OUTPATIENT
Start: 2020-07-27 | End: 2020-07-29 | Stop reason: HOSPADM

## 2020-07-27 RX ADMIN — IOPAMIDOL 70 ML: 612 INJECTION, SOLUTION INTRAVENOUS at 08:01

## 2020-07-27 RX ADMIN — SODIUM CHLORIDE 1000 ML: 900 INJECTION, SOLUTION INTRAVENOUS at 07:23

## 2020-07-27 RX ADMIN — ONDANSETRON 4 MG: 2 INJECTION INTRAMUSCULAR; INTRAVENOUS at 17:32

## 2020-07-27 RX ADMIN — MORPHINE SULFATE 1 MG: 2 INJECTION, SOLUTION INTRAMUSCULAR; INTRAVENOUS at 17:32

## 2020-07-27 RX ADMIN — Medication 10 ML: at 17:33

## 2020-07-27 RX ADMIN — INSULIN LISPRO 2 UNITS: 100 INJECTION, SOLUTION INTRAVENOUS; SUBCUTANEOUS at 23:14

## 2020-07-27 RX ADMIN — DEXTROSE MONOHYDRATE AND SODIUM CHLORIDE 125 ML/HR: 5; .9 INJECTION, SOLUTION INTRAVENOUS at 17:41

## 2020-07-27 RX ADMIN — Medication 10 ML: at 22:00

## 2020-07-27 RX ADMIN — DEXTROSE MONOHYDRATE, SODIUM CHLORIDE, AND POTASSIUM CHLORIDE 125 ML/HR: 50; 4.5; 1.49 INJECTION, SOLUTION INTRAVENOUS at 12:37

## 2020-07-27 NOTE — PROGRESS NOTES
completed the initial Spiritual Assessment of the patient in bed 7 of the emergency room, and offered Pastoral Care support once again as patient was just here a week or so ago and was seen by me then. Patient is now back with a small bowel obstruction. . Patient does not have any Jain/cultural needs that will affect patients preferences in health care. Chaplains will continue to follow and will provide pastoral care on an as needed/requested basis.     Alanna Kawasaki Spiritual Care Department  [phone]

## 2020-07-27 NOTE — PROGRESS NOTES
1348  Pt arrived in room,aox4, abd wound present  pta cdi, healed, ngt in place, call bell in reach, LBM 2days ago per pt , at 7/25    1736 canister changed, 1500 ml output per ngt, yellow green output, pain med given and zofran given      1830 NGT working well, no complaints of pain, call bell in reach      2010 Bedside and Verbal shift change report given to Nataly Mares  (oncoming nurse) by Breanne Dimas RN  (offgoing nurse). Report included the following information SBAR, Kardex, Intake/Output and MAR.

## 2020-07-27 NOTE — ED NOTES
TRANSFER - ED to INPATIENT REPORT:    Verbal report given to Ila Small RN (name) on [address]  being transferred to 21  (unit) for routine progression of care       Report consisted of patients Situation, Background, Assessment and   Recommendations(SBAR). SBAR report made available to receiving floor on this patient being transferred to 2400  for routine progression of care       Admitting diagnosis SBO (small bowel obstruction) ([facility] [K56.609]  HTN (hypertension) [I10]  Diabetes ([facility] [address] [E11.9]    Information from the following report(s) SBAR was made available to receiving floor. Lines:   Peripheral IV 07/27/20 Left Antecubital (Active)   Site Assessment Clean, dry, & intact 07/27/20 0651   Phlebitis Assessment 0 07/27/20 0651   Infiltration Assessment 0 07/27/20 0651   Dressing Status Clean, dry, & intact 07/27/20 0651   Hub Color/Line Status Green 07/27/20 0651        HCG Status for ALL Females under 55 y/o: NO     Medication list confirmed with patient    Opportunity for questions and clarification was provided.       Patient is oriented to time, place, person and situation  Patient is  continent and ambulatory without assist     Valuables transported with patient     Patient transported with:   Tech    MAP (Monitor): 97 =Monitored (most recent)  Vitals w/ MEWS Score (last day)     Date/Time MEWS Score Pulse Resp Temp BP Level of Consciousness SpO2    07/27/20 13:00:55  1  100  18  98 °F (36.7 °C)  148/80  Alert  100 %    07/27/20 1200          148/83    99 %    07/27/20 1145              98 %    07/27/20 1130          142/78    99 %    07/27/20 1115              100 %    07/27/20 1100          (!) 148/91    100 %    07/27/20 1045              99 %    07/27/20 1030          141/80    99 %    07/27/20 1015              99 %    07/27/20 1000          127/79    100 %    07/27/20 0945              99 %    07/27/20 0930          144/80    99 % 07/27/20 0900          146/76    100 %    07/27/20 0830          137/70    100 %    07/27/20 0815              100 %    07/27/20 0800          149/77        07/27/20 0730          124/69    100 %    07/27/20 0715              100 %    07/27/20 0700          117/72    100 %    07/27/20 0629  1  92  19  98.3 °F (36.8 °C)  120/74  Alert  100 %              Septic Patients:     Lactic Acid  No results found for: LACPOC (Most recent on top)  Repeat drawn: NO Time: N/A     ALL LACTIC ACIDS GREATER THAN 2 MUST BE REPEATED POC WITHIN 4 HOURS OR PRIOR TO ADMISSION               Total Fluid Bolus initiated and documented on MAR: NO    All ordered antibiotics initiated within first 3 hours of TIME ZERO?   NO

## 2020-07-27 NOTE — H&P
Internal Medicine History and Physical          Subjective     HPI: Marlyn Corado is a 77 y.o. male with a PMHx of HTN, CAD, DM who presented to the ED with c/o abdominal pain and constipation x2 days. He was recently hospitalized at [facility] and had ex lap with extensive ADALID and reduction of small bowel volvulus. He was discharged 1 week ago. Today in the ED, creatinine elevated at 1.79, Mg 1.2, WBC wnl. CT consistent with SBO with transition point in RLQ. NGT placed. GS consulted. Patient will be admitted for further evaluation and treatment.      PMHx:  Past Medical History:   Diagnosis Date    Arthritis     Benign hypertension     Coronary artery disease     Diabetes mellitus ([facility] [address]     Enlarged heart     Erectile dysfunction     Frequency of micturition     Hypogonadism in male     Impotence     Incomplete bladder emptying     Musculoskeletal pain     Nocturia     Urgency of micturition     Wrist joint pain        PSurgHx:  Past Surgical History:   Procedure Laterality Date    HX CARPAL TUNNEL RELEASE      HX HERNIA REPAIR         SocialHx:  Social History     Socioeconomic History    Marital status:      Spouse name: Not on file    Number of children: Not on file    Years of education: Not on file    Highest education level: Not on file   Occupational History    Not on file   Social Needs    Financial resource strain: Not on file    Food insecurity     Worry: Not on file     Inability: Not on file    Transportation needs     Medical: Not on file     Non-medical: Not on file   Tobacco Use    Smoking status: Former Smoker     Packs/day: 0.50     Years: 5.00     Pack years: 2.50     Types: Cigarettes     Last attempt to quit: 1976     Years since quittin.6    Smokeless tobacco: Never Used   Substance and Sexual Activity    Alcohol use: Yes     Comment: occassionally    Drug use: No    Sexual activity: Not Currently     Partners: Female   Lifestyle    Physical activity     Days per week: Not on file     Minutes per session: Not on file    Stress: Not on file   Relationships    Social connections     Talks on phone: Not on file     Gets together: Not on file     Attends Scientology service: Not on file     Active member of club or organization: Not on file     Attends meetings of clubs or organizations: Not on file     Relationship status: Not on file    Intimate partner violence     Fear of current or ex partner: Not on file     Emotionally abused: Not on file     Physically abused: Not on file     Forced sexual activity: Not on file   Other Topics Concern    Not on file   Social History Narrative    Not on file       FamilyHx:  Family History   Problem Relation Age of Onset    Diabetes Mother     Hypertension Mother     Heart Failure Mother     High Cholesterol Mother     Stroke Mother     Other Mother         vision problems    Diabetes Father     Hypertension Father     Diabetes Sister     HIV/AIDS Sister     Hypertension Sister     HIV/AIDS Sister     No Known Problems Brother     HIV/AIDS Sister     Diabetes Maternal Uncle     Hypertension Maternal Uncle        Home Medications:  Prior to Admission Medications   Prescriptions Last Dose Informant Patient Reported? Taking? Diabetic Supplies, [address] (INJECT-EASE AUTOMATIC INJECTOR) misc   No No   Sig: FOR USE WITH INTRACAVERNOSAL INJECTIONS.   amLODIPine (NORVASC) 10 mg tablet   Yes No   Sig: Take  by mouth daily. amoxicillin-clavulanate (Augmentin) 875-125 mg per tablet   No No   Sig: Take 1 Tab by mouth every twelve (12) hours for 5 days. cloNIDine HCl (CATAPRES) 0.1 mg tablet   Yes No   Sig: Take  by mouth two (2) times a day. insulin glargine (LANTUS) 100 unit/mL injection   No No   Si Units by SubCUTAneous route daily. losartan-hydrochlorothiazide (HYZAAR) 100-25 mg per tablet   Yes No   Sig: Take 1 Tab by Mouth Once a Day.    metFORMIN (GLUCOPHAGE) 1,000 mg tablet   Yes No   Sig: Take 1,000 mg by mouth two (2) times daily (with meals). metoprolol tartrate (LOPRESSOR) 50 mg tablet   No No   Sig: Take 1 Tab by mouth two (2) times a day. sitaGLIPtin (JANUVIA) 100 mg tablet   Yes No   Sig: Take 100 mg by mouth daily. tadalafiL (CIALIS) 5 mg tablet   No No   Sig: Take 1 Tab by mouth daily as needed for Erectile Dysfunction. tamsulosin (FLOMAX) 0.4 mg capsule   Yes No   Si.4 mg.   travoprost (Travatan Z) 0.004 % ophthalmic solution   Yes No   Si gtt QHS OS      Facility-Administered Medications: None       Allergies:   Allergies   Allergen Reactions    Aspirin Anaphylaxis     Fatal    Lisinopril Cough        Review of Systems:  CONST: Fever, weight loss, fatigue or chills  HEENT: Recent changes in vision, vertigo, epistaxis, dysphagia and hoarseness  CV: Chest pain, palpitations, HTN, edema and varicosities  RESP: Cough, shortness of breath, wheezing, hemoptysis, snoring and reactive airway disease  GI: Nausea, vomiting, abdominal pain, change in bowel habits, hematochezia, melena, and GERD   : Hematuria, dysuria, frequency, urgency, nocturia and stress urinary incontinence   MS: Weakness, joint pain and arthritis  ENDO: Diabetes, thyroid disease, polyuria, polydipsia, polyphagia, poor wound healing, heat intolerance, cold intolerance  LYMPH/HEME: Anemia, bruising and history of blood transfusions  INTEG: Dermatitis, abnormal moles  NEURO: Dizziness, headache, fainting, seizures and stroke  PSYCH: Anxiety and depression      Objective      Visit Vitals  /80   Pulse 100   Temp 98 °F (36.7 °C)   Resp 18   Wt 82.6 kg (182 lb)   SpO2 100%   BMI 26.11 kg/m²       Physical Exam:  General Appearance: NAD, conversant  HENT: normocephalic/atraumatic, moist mucus membranes  Lungs: CTA with normal respiratory effort  Cardiovascular: RRR, no m/r/g  Abdomen: soft, diffuse TTP, no guarding, +bowel sounds  Extremities: no cyanosis, no peripheral edema  Neuro: moves all extremities, no focal deficits  Psych: appropriate affect, alert and oriented to person, place and time    Laboratory Studies:  BMP:   Lab Results   Component Value Date/Time     07/27/2020 06:47 AM    K 4.0 07/27/2020 06:47 AM     07/27/2020 06:47 AM    CO2 25 07/27/2020 06:47 AM    AGAP 6 07/27/2020 06:47 AM     (H) 07/27/2020 06:47 AM    BUN 23 (H) 07/27/2020 06:47 AM    CREA 1.79 (H) 07/27/2020 06:47 AM    GFRAA 46 (L) 07/27/2020 06:47 AM    GFRNA 38 (L) 07/27/2020 06:47 AM     CBC:   Lab Results   Component Value Date/Time    WBC 6.2 07/27/2020 06:47 AM    HGB 10.9 (L) 07/27/2020 06:47 AM    HCT 31.3 (L) 07/27/2020 06:47 AM     07/27/2020 06:47 AM       Imaging Reviewed:  Mary De Anda Abd (kub)    Result Date: 7/27/2020  Abdomen, 1 radiograph COMPARISON: CT: 7/27/2020. Abdominal radiograph: 7/18/2020 INDICATION: NG tube placement. FINDINGS: Supine view of the abdomen obtained. LINES/DEVICES: Single enteric tube courses beneath the diaphragm, tip and side port project in the left upper quadrant, presumably within the stomach. LOWER THORAX: The heart is normal size. No large consolidation or effusion in the visualized lung bases. BOWEL: Dilated loops of small bowel measure up to 6.1 cm consistent with small bowel obstruction. SOFT TISSUES: No appreciable organomegaly or suspicious calcification. Multiple surgical staples project over the midline lower abdomen and pelvis. BONES: No acute osseous abnormality. Subtle dextrocurvature of the lumbar spine with associated degenerative changes. Impression: 1. NG tube appears appropriately positioned in the stomach. 2.  Small bowel obstruction, better characterized recent prior CT abdomen/pelvis. Ct Abd Pelv W Cont    Result Date: 7/27/2020  EXAM: CT of the abdomen and pelvis INDICATION: Abdominal pain and distention. Patient status post exploratory laparotomy and reduction of small bowel volvulus 7/17/2020.  COMPARISON: Several prior studies, most recently CT scan July 15, 2020. TECHNIQUE: Axial CT imaging of the abdomen and pelvis was performed with intravenous contrast. Multiplanar reformats were generated. One or more dose reduction techniques were used on this CT: automated exposure control, adjustment of the mAs and/or kVp according to patient size, and iterative reconstruction techniques. The specific techniques used on this CT exam have been documented in the patient's electronic medical record. Digital Imaging and Communications in Medicine (DICOM) format image data are available to nonaffiliated external healthcare facilities or entities on a secure, media free, reciprocally searchable basis with patient authorization for at least a 12-month period after this study. _______________ FINDINGS: LOWER CHEST: There are mild bibasilar changes of atelectasis again noted along with pleural-based calcifications within the posterior aspect of the right hemithorax. Previously noted right middle lobe pneumonia appears nearly resolved, with small areas of peribronchial alveolar consolidation. No alveolar consolidation. Cardiac size is mildly enlarged without evidence of pericardial effusion. LIVER, BILIARY: Hepatic parenchymal enhancement is uniform. No biliary dilation. Gallbladder is unremarkable. PANCREAS: Normal. SPLEEN: Normal. ADRENALS: Normal. KIDNEYS/URETERS/BLADDER: Symmetric renal enhancement. No hydronephrosis. No urolithiasis. Urinary bladder normal in CT appearance. PELVIC ORGANS: Trace amount of pelvic ascites. Pelvic organs proper appear within normal limits. VASCULATURE: There is mild diffuse aortobiiliac atherosclerotic vascular calcification without evidence of aneurysmal dilatation. LYMPH NODES: Scattered subcentimeter mesenteric and retroperitoneal lymph nodes are again noted without discretely enlarged lymph nodes by size criteria.  GASTROINTESTINAL TRACT: Diffuse stomach distention as well as small bowel dilatation with point transition noted in the right interpolar portion of the abdomen, with completely decompressed terminal ileum demonstrated. Large bowel of normal caliber, containing oral contrast from previously noted examination. The dilated loops of small bowel enhance normally. There is no pneumatosis or mesenteric venous gas. Small quantity of interloop fluid is noted. No free intraperitoneal gas. Normal appendix. BONES: No acute or aggressive osseous abnormalities identified. Trace anterolisthesis of L3 on L4 noted related to advanced same level facet joint osteoarthritis. OTHER: Cutaneous staples at midline. _______________     IMPRESSION: 1.  CT findings in keeping with small bowel obstruction with point of transition present in the right mid to lower portion of the abdomen. Completely decompressed terminal ileum noted distal to the region of caliber change.   > Small quantity pelvic ascites and interloop fluid.   > No pneumatosis or mesenteric gas. No free intraperitoneal gas. 2. Basilar changes of atelectasis with improved appearance to previously noted right middle lobe pneumonia. Assessment/Plan     Principal Problem:    SBO (small bowel obstruction) ([facility] [address] (7/27/2020)    Active Problems:    Diabetes ([address] (7/27/2020)      HTN (hypertension) (7/27/2020)      PRADEEP (acute kidney injury) ([facility] [address] (7/27/2020)      SBO  - appreciate GS  - NPO  - monitor NGT output  - conservative use of narcotics  - can switch to toradol when kidneys improve  - small bowel series tomorrow    PRADEEP  - IV fluids  - monitor    DM  - ssi  - monitor q6    HTN  - hold home meds  - PRN hydralazine    - Cont acceptable home medications for chronic conditions   - DVT protocol    I have personally reviewed all pertinent labs, films and EKGs that have officially resulted. I reviewed available electronic documentation outlining the initial presentation as well as the emergency room physician's encounter.     MODESTA Garrett[facility]  Hospitalist Division  Office:  [phone]  Pager: [phone]

## 2020-07-27 NOTE — PROGRESS NOTES
TRANSFER - IN REPORT:    Verbal report received from [address] (name) on [address] [place]  being received from ED (unit) for routine progression of care      Report consisted of patients Situation, Background, Assessment and   Recommendations(SBAR). Information from the following report(s) SBAR, Kardex, Intake/Output and MAR was reviewed with the receiving nurse. Opportunity for questions and clarification was provided. Assessment completed upon patients arrival to unit and care assumed.

## 2020-07-27 NOTE — ED PROVIDER NOTES
HPI Patient presents with a chief complaint of abdominal distention and constipation. Patient recently underwent exploratory laparotomy by Dr. Katelin Carvajal who performed  reduction of small bowel volvulus, lysis of mesenteric adhesions, and mesenteric lymph node biopsy on 7/17/2020. He was discharged from the hospital on 7/22/2020 and states that he has not had a bowel movement since he was discharged. He reports increasing abdominal pain and distention. Patient is also complaining of edema in his bilateral lower extremities. Patient states that he was told he had developed problems with his kidneys during his last admission.     Past Medical History:   Diagnosis Date    Arthritis     Benign hypertension     Coronary artery disease     Diabetes mellitus ([facility] [address]     Enlarged heart     Erectile dysfunction     Frequency of micturition     Hypogonadism in male     Impotence     Incomplete bladder emptying     Musculoskeletal pain     Nocturia     Urgency of micturition     Wrist joint pain        Past Surgical History:   Procedure Laterality Date    HX CARPAL TUNNEL RELEASE      HX HERNIA REPAIR           Family History:   Problem Relation Age of Onset    Diabetes Mother     Hypertension Mother     Heart Failure Mother     High Cholesterol Mother     Stroke Mother     Other Mother         vision problems    Diabetes Father     Hypertension Father     Diabetes Sister     HIV/AIDS Sister     Hypertension Sister     HIV/AIDS Sister     No Known Problems Brother     HIV/AIDS Sister     Diabetes Maternal Uncle     Hypertension Maternal Uncle        Social History     Socioeconomic History    Marital status:      Spouse name: Not on file    Number of children: Not on file    Years of education: Not on file    Highest education level: Not on file   Occupational History    Not on file   Social Needs    Financial resource strain: Not on file    Food insecurity     Worry: Not on file     Inability: Not on file    Transportation needs     Medical: Not on file     Non-medical: Not on file   Tobacco Use    Smoking status: Former Smoker     Packs/day: 0.50     Years: 5.00     Pack years: 2.50     Types: Cigarettes     Last attempt to quit: 1976     Years since quittin.6    Smokeless tobacco: Never Used   Substance and Sexual Activity    Alcohol use: Yes     Comment: occassionally    Drug use: No    Sexual activity: Not Currently     Partners: Female   Lifestyle    Physical activity     Days per week: Not on file     Minutes per session: Not on file    Stress: Not on file   Relationships    Social connections     Talks on phone: Not on file     Gets together: Not on file     Attends Faith service: Not on file     Active member of club or organization: Not on file     Attends meetings of clubs or organizations: Not on file     Relationship status: Not on file    Intimate partner violence     Fear of current or ex partner: Not on file     Emotionally abused: Not on file     Physically abused: Not on file     Forced sexual activity: Not on file   Other Topics Concern    Not on file   Social History Narrative    Not on file         ALLERGIES: Aspirin and Lisinopril    Review of Systems   Constitutional: Negative for chills, fatigue and fever. HENT: Negative for congestion, ear pain, rhinorrhea, sore throat and trouble swallowing. Eyes: Negative for photophobia, pain and itching. Respiratory: Negative for cough, choking and shortness of breath. Cardiovascular: Positive for leg swelling. Negative for chest pain and palpitations. Gastrointestinal: Positive for abdominal distention, abdominal pain, constipation and nausea. Negative for vomiting. Endocrine: Negative for polydipsia, polyphagia and polyuria. Genitourinary: Negative for discharge, dysuria, flank pain and penile pain. Musculoskeletal: Negative for arthralgias, myalgias and neck pain.    Skin: Negative for color change, pallor and rash. Allergic/Immunologic: Negative for food allergies and immunocompromised state. Neurological: Negative for dizziness, syncope, numbness and headaches. Psychiatric/Behavioral: Negative for confusion, hallucinations and suicidal ideas. The patient is not nervous/anxious. Vitals:    07/27/20 0629   BP: 120/74   Pulse: 92   Resp: 19   Temp: 98.3 °F (36.8 °C)   SpO2: 100%   Weight: 82.6 kg (182 lb)            Physical Exam  Vitals signs and nursing note reviewed. Constitutional:       General: He is not in acute distress. Appearance: He is well-developed. He is not diaphoretic. HENT:      Head: Normocephalic and atraumatic. Right Ear: External ear normal.      Left Ear: External ear normal.      Nose: Nose normal.      Mouth/Throat:      Pharynx: No oropharyngeal exudate. Eyes:      General: No scleral icterus. Right eye: No discharge. Left eye: No discharge. Conjunctiva/sclera: Conjunctivae normal.      Pupils: Pupils are equal, round, and reactive to light. Neck:      Musculoskeletal: Normal range of motion and neck supple. Thyroid: No thyromegaly. Vascular: No JVD. Trachea: No tracheal deviation. Cardiovascular:      Rate and Rhythm: Normal rate and regular rhythm. Heart sounds: Normal heart sounds. No murmur. No friction rub. No gallop. Pulmonary:      Effort: Pulmonary effort is normal. No respiratory distress. Breath sounds: Normal breath sounds. No stridor. No wheezing or rales. Chest:      Chest wall: No tenderness. Abdominal:      General: Bowel sounds are normal. There is distension. Palpations: Abdomen is soft. There is no mass. Tenderness: There is generalized abdominal tenderness (Mild ). There is no guarding or rebound. Comments: Midline surgical scar with staples in place   Genitourinary:     Penis: Normal.    Musculoskeletal: Normal range of motion.          General: No tenderness. Lymphadenopathy:      Cervical: No cervical adenopathy. Skin:     General: Skin is warm and dry. Capillary Refill: Capillary refill takes less than 2 seconds. Coloration: Skin is not pale. Findings: No erythema or rash. Neurological:      General: No focal deficit present. Mental Status: He is alert and oriented to person, place, and time. Cranial Nerves: No cranial nerve deficit. Motor: No abnormal muscle tone. Coordination: Coordination normal.      Deep Tendon Reflexes: Reflexes are normal and symmetric. Psychiatric:         Mood and Affect: Mood is not anxious or depressed. Behavior: Behavior normal.         Thought Content: Thought content normal.         Judgment: Judgment normal.          MDM  Number of Diagnoses or Management Options  Diagnosis management comments: Differential diagnosis includes. Bowel obstruction, constipation, acute kidney injury.        Amount and/or Complexity of Data Reviewed  Clinical lab tests: ordered and reviewed  Tests in the radiology section of CPT®: ordered and reviewed  Tests in the medicine section of CPT®: ordered and reviewed  Review and summarize past medical records: yes    Risk of Complications, Morbidity, and/or Mortality  Presenting problems: high  Diagnostic procedures: high  Management options: high    Patient Progress  Patient progress: stable         Procedures      Recent Results (from the past 12 hour(s))   METABOLIC PANEL, COMPREHENSIVE    Collection Time: 07/27/20  6:47 AM   Result Value Ref Range    Sodium 137 136 - 145 mmol/L    Potassium 4.0 3.5 - 5.5 mmol/L    Chloride 106 100 - 111 mmol/L    CO2 25 21 - 32 mmol/L    Anion gap 6 3.0 - 18 mmol/L    Glucose 114 (H) 74 - 99 mg/dL    BUN 23 (H) 7.0 - 18 MG/DL    Creatinine 1.79 (H) 0.6 - 1.3 MG/DL    BUN/Creatinine ratio 13 12 - 20      GFR est AA 46 (L) >60 ml/min/1.73m2    GFR est non-AA 38 (L) >60 ml/min/1.73m2    Calcium 8.0 (L) 8.5 - 10.1 MG/DL    Bilirubin, total 1.4 (H) 0.2 - 1.0 MG/DL    ALT (SGPT) 34 16 - 61 U/L    AST (SGOT) 27 10 - 38 U/L    Alk. phosphatase 98 45 - 117 U/L    Protein, total 7.2 6.4 - 8.2 g/dL    Albumin 2.5 (L) 3.4 - 5.0 g/dL    Globulin 4.7 (H) 2.0 - 4.0 g/dL    A-G Ratio 0.5 (L) 0.8 - 1.7     TROPONIN I    Collection Time: 07/27/20  6:47 AM   Result Value Ref Range    Troponin-I, QT <0.02 0.0 - 0.045 NG/ML   CBC WITH AUTOMATED DIFF    Collection Time: 07/27/20  6:47 AM   Result Value Ref Range    WBC 6.2 4.6 - 13.2 K/uL    RBC 3.36 (L) 4.70 - 5.50 M/uL    HGB 10.9 (L) 13.0 - 16.0 g/dL    HCT 31.3 (L) 36.0 - 48.0 %    MCV 93.2 74.0 - 97.0 FL    MCH 32.4 24.0 - 34.0 PG    MCHC 34.8 31.0 - 37.0 g/dL    RDW 13.0 11.6 - 14.5 %    PLATELET 223 379 - 566 K/uL    MPV 10.3 9.2 - 11.8 FL    NEUTROPHILS 60 40 - 73 %    LYMPHOCYTES 23 21 - 52 %    MONOCYTES 15 (H) 3 - 10 %    EOSINOPHILS 2 0 - 5 %    BASOPHILS 0 0 - 2 %    ABS. NEUTROPHILS 3.7 1.8 - 8.0 K/UL    ABS. LYMPHOCYTES 1.4 0.9 - 3.6 K/UL    ABS. MONOCYTES 1.0 0.05 - 1.2 K/UL    ABS. EOSINOPHILS 0.1 0.0 - 0.4 K/UL    ABS. BASOPHILS 0.0 0.0 - 0.1 K/UL    DF AUTOMATED     MAGNESIUM    Collection Time: 07/27/20  6:47 AM   Result Value Ref Range    Magnesium 1.2 (L) 1.6 - 2.6 mg/dL   PHOSPHORUS    Collection Time: 07/27/20  6:47 AM   Result Value Ref Range    Phosphorus 4.0 2.5 - 4.9 MG/DL   NT-PRO BNP    Collection Time: 07/27/20  6:47 AM   Result Value Ref Range    NT pro- 0 - 900 PG/ML   PROTHROMBIN TIME + INR    Collection Time: 07/27/20  6:47 AM   Result Value Ref Range    Prothrombin time 14.4 11.5 - 15.2 sec    INR 1.1 0.8 - 1.2           CT ABD PELV W CONT   Final Result   IMPRESSION:      1.  CT findings in keeping with small bowel obstruction with point of transition   present in the right mid to lower portion of the abdomen.  Completely   decompressed terminal ileum noted distal to the region of caliber change.      > Small quantity pelvic ascites and interloop fluid.      > No pneumatosis or mesenteric gas. No free intraperitoneal gas. 2. Basilar changes of atelectasis with improved appearance to previously noted   right middle lobe pneumonia. 9:10 AM   Wilfrido Gomez DO discussed care with Dr. Skinny Browning Surgeon. Standard discussion; including history of patients chief complaint, available diagnostic results, and treatment course. He agrees with plans for NG tube and admission to the Hospitalist Service. 9:23 AM   Wilfrido Gomez DO discussed care with Dr. Serena Tate. . Standard discussion; including history of patients chief complaint, available diagnostic results, and treatment course. He agrees with plan for admission and will see the patient in consultation. Diagnosis:   1. Small bowel obstruction due to postoperative adhesions    2. Renal insufficiency    3. Hypocalcemia    4. Anemia, unspecified type          Disposition: Discharge home    Follow-up Information    None         Patient's Medications   Start Taking    No medications on file   Continue Taking    AMLODIPINE (NORVASC) 10 MG TABLET    Take  by mouth daily. CLONIDINE HCL (CATAPRES) 0.1 MG TABLET    Take  by mouth two (2) times a day. DIABETIC SUPPLIES, [employer] (INJECT-EASE AUTOMATIC INJECTOR) [facility] [place]    FOR USE WITH INTRACAVERNOSAL INJECTIONS. INSULIN GLARGINE (LANTUS) 100 UNIT/ML INJECTION    8 Units by SubCUTAneous route daily. LOSARTAN-HYDROCHLOROTHIAZIDE (HYZAAR) 100-25 MG PER TABLET    Take 1 Tab by Mouth Once a Day. METFORMIN (GLUCOPHAGE) 1,000 MG TABLET    Take 1,000 mg by mouth two (2) times daily (with meals). METOPROLOL TARTRATE (LOPRESSOR) 50 MG TABLET    Take 1 Tab by mouth two (2) times a day. SITAGLIPTIN (JANUVIA) 100 MG TABLET    Take 100 mg by mouth daily. TADALAFIL (CIALIS) 5 MG TABLET    Take 1 Tab by mouth daily as needed for Erectile Dysfunction.     TAMSULOSIN (FLOMAX) 0.4 MG CAPSULE    0.4 mg.    TRAVOPROST (TRAVATAN Z) 0.004 % OPHTHALMIC SOLUTION    1 gtt QHS OS   These Medications have changed    No medications on file   Stop Taking    No medications on file

## 2020-07-27 NOTE — ED TRIAGE NOTES
Pt sates he had surgery Friday for \"lazy gut\". He states that he does not think any was removed. He is complaining of pain and swelling.

## 2020-07-27 NOTE — CONSULTS
General Surgery Consult      Alison Duke  Admit date: 2020    MRN: [id number]     : 1955     Age: 77 y.o. Attending Physician: Duc Barksdale MD, FACS      Subjective:     Bakari Keith is a 77 y.o. male who is very well-known to me. Ten days ago I have performed an exploratory laparotomy with extensive lysis of adhesion for what seems to be a small bowel malrotation and internal hernias. His intra-abdominal findings were very strange and rare and the patient did not have any previous laparotomies in the past but he had an open right inguinal hernia which does not explain his adhesions and his small bowel volvulus. The adhesions looks like [place]'s band but they were not connected to the duodenum. After the surgery the patient did well and he was passing flatus and having normal bowel movement and he was discharged home on the  of this month. The patient stated that he has been doing well and he was passing flatus and having normal bowel movement and the last bowel movement he had was 2 days ago, though I looked at the ER note and he stated that he did not have a bowel movement since discharge which is 7 days ago. However again the patient stated that he is not only having bowel movement but passing flatus as well and he was eating normally until 2 days ago when he ate some type of nuts and then he stated he felt distended and bloated. The patient is currently complaining of abdominal pain with distention as well as nausea and vomiting. His vital signs are normal with no fever or tachycardia and his blood pressure is normal with no evidence of hypotension. His WBC is normal at 6000 but his creatinine is slightly elevated at 1.8. An NG tube was placed in the emergency room and when I examined the patient he had less than 100 cc in the canister.   He will be admitted to the hospitalist service and I was consulted because of the CT scan finding of recurrent small bowel obstruction.     Patient Active Problem List    Diagnosis Date Noted    Diabetes ([facility] 2020    SBO (small bowel obstruction) ([facility] 2020    HTN (hypertension) 2020    Hyponatremia 2020    Hyperglycemia 2020    Stage 3 acute kidney injury ([facility] 2020    Community acquired pneumonia of right middle lobe of lung 2020    Former smoker 2020    BPH (benign prostatic hyperplasia) 2020    Frequency of micturition     Incomplete bladder emptying     Urgency of micturition     Hypogonadism in male 2017    Nocturia 2017    Urge incontinence 2017    Effusion of knee 2016    Erectile dysfunction 2016    Enlarged heart     Coronary artery disease     Musculoskeletal pain     Wrist joint pain     Arthritis     Benign hypertension     Impotence     Malignant hypertension 2013    Chest pain 2013    Syncope 2013    Essential hypertension 2013    Type II diabetes mellitus ([facility] [facility] 2013     Past Medical History:   Diagnosis Date    Arthritis     Benign hypertension     Coronary artery disease     Diabetes mellitus ([facility]     Enlarged heart     Erectile dysfunction     Frequency of micturition     Hypogonadism in male     Impotence     Incomplete bladder emptying     Musculoskeletal pain     Nocturia     Urgency of micturition     Wrist joint pain       Past Surgical History:   Procedure Laterality Date    HX CARPAL TUNNEL RELEASE      HX HERNIA REPAIR        Social History     Tobacco Use    Smoking status: Former Smoker     Packs/day: 0.50     Years: 5.00     Pack years: 2.50     Types: Cigarettes     Last attempt to quit: 1976     Years since quittin.6    Smokeless tobacco: Never Used   Substance Use Topics    Alcohol use: Yes     Comment: occassionally      Social History     Tobacco Use   Smoking Status Former Smoker    Packs/day: 0.50    Years: 5.00    Pack years: 2.50    Types: Cigarettes    Last attempt to quit: 1976    Years since quittin.7   Smokeless Tobacco Never Used     Family History   Problem Relation Age of Onset    Diabetes Mother     Hypertension Mother     Heart Failure Mother     High Cholesterol Mother     Stroke Mother     Other Mother         vision problems    Diabetes Father     Hypertension Father     Diabetes Sister     HIV/AIDS Sister     Hypertension Sister     HIV/AIDS Sister     No Known Problems Brother     HIV/AIDS Sister     Diabetes Maternal Uncle     Hypertension Maternal Uncle       No current facility-administered medications for this encounter. Current Outpatient Medications   Medication Sig    insulin glargine (LANTUS) 100 unit/mL injection 8 Units by SubCUTAneous route daily.  metoprolol tartrate (LOPRESSOR) 50 mg tablet Take 1 Tab by mouth two (2) times a day.  travoprost (Travatan Z) 0.004 % ophthalmic solution 1 gtt QHS OS    tadalafiL (CIALIS) 5 mg tablet Take 1 Tab by mouth daily as needed for Erectile Dysfunction.  [facility] Supp[facility], [facility] (INJECT-EASE AUTOMATIC INJECTOR[place] [facility] [place] INTRACAVERNOSAL INJECTIONS.  cloNIDine HCl (CATAPRES) 0.1 mg tablet Take  by mouth two (2) times a day.  tamsulosin (FLOMAX) 0.4 mg capsule 0.4 mg.    losartan-hydrochlorothiazide (HYZAAR) 100-25 mg per tablet Take 1 Tab by Mouth Once a Day.  amLODIPine (NORVASC) 10 mg tablet Take  by mouth daily.  sitaGLIPtin (JANUVIA) 100 mg tablet Take 100 mg by mouth daily.  metFORMIN (GLUCOPHAGE) 1,000 mg tablet Take 1,000 mg by mouth two (2) times daily (with meals).       Allergies   Allergen Reactions    Aspirin Anaphylaxis     Fatal    Lisinopril Cough        Review of Systems:  Constitutional: negative  Eyes: negative  Ears, Nose, Mouth, Throat, and Face: negative  Respiratory: negative  Cardiovascular: negative  Gastrointestinal: positive for nausea, vomiting and abdominal pain  Genitourinary:negative  Integument/Breast: negative  Hematologic/Lymphatic: negative  Musculoskeletal:negative  Neurological: negative  Behavioral/Psychiatric: negative  Endocrine: negative  Allergic/Immunologic: negative      Objective:     Visit Vitals  /80   Pulse 92   Temp 98.3 °F (36.8 °C)   Resp 19   Wt 82.6 kg (182 lb)   SpO2 99%   BMI 26.11 kg/m²       Physical Exam:      General:  in no apparent distress, alert, oriented times 3, afebrile, anicteric and cooperative   Eyes:  conjunctivae and sclerae normal, pupils equal, round, reactive to light   Throat & Neck: no erythema or exudates noted and neck supple and symmetrical; no palpable masses   Lungs:   clear to auscultation bilaterally   Heart:  Regular rate and rhythm   Abdomen:   rounded and distended, soft, nontender, nondistended, no masses or organomegaly. His midline wound is healing well  with staples in place.     Extremities: extremities normal, atraumatic, no cyanosis or edema   Skin: Normal.         Imaging and Lab Review:     CBC:   Lab Results   Component Value Date/Time    WBC 6.2 07/27/2020 06:47 AM    RBC 3.36 (L) 07/27/2020 06:47 AM    HGB 10.9 (L) 07/27/2020 06:47 AM    HCT 31.3 (L) 07/27/2020 06:47 AM    PLATELET 748 69/00/3427 06:47 AM     BMP:   Lab Results   Component Value Date/Time    Glucose 114 (H) 07/27/2020 06:47 AM    Sodium 137 07/27/2020 06:47 AM    Potassium 4.0 07/27/2020 06:47 AM    Chloride 106 07/27/2020 06:47 AM    CO2 25 07/27/2020 06:47 AM    BUN 23 (H) 07/27/2020 06:47 AM    Creatinine 1.79 (H) 07/27/2020 06:47 AM    Calcium 8.0 (L) 07/27/2020 06:47 AM     CMP:  Lab Results   Component Value Date/Time    Glucose 114 (H) 07/27/2020 06:47 AM    Sodium 137 07/27/2020 06:47 AM    Potassium 4.0 07/27/2020 06:47 AM    Chloride 106 07/27/2020 06:47 AM    CO2 25 07/27/2020 06:47 AM    BUN 23 (H) 07/27/2020 06:47 AM    Creatinine 1.79 (H) 07/27/2020 06:47 AM    Calcium 8.0 (L) 07/27/2020 06:47 AM    Anion gap 6 07/27/2020 06:47 AM    BUN/Creatinine ratio 13 07/27/2020 06:47 AM    Alk. phosphatase 98 07/27/2020 06:47 AM    Protein, total 7.2 07/27/2020 06:47 AM    Albumin 2.5 (L) 07/27/2020 06:47 AM    Globulin 4.7 (H) 07/27/2020 06:47 AM    A-G Ratio 0.5 (L) 07/27/2020 06:47 AM       Recent Results (from the past 24 hour(s))   METABOLIC PANEL, COMPREHENSIVE    Collection Time: 07/27/20  6:47 AM   Result Value Ref Range    Sodium 137 136 - 145 mmol/L    Potassium 4.0 3.5 - 5.5 mmol/L    Chloride 106 100 - 111 mmol/L    CO2 25 21 - 32 mmol/L    Anion gap 6 3.0 - 18 mmol/L    Glucose 114 (H) 74 - 99 mg/dL    BUN 23 (H) 7.0 - 18 MG/DL    Creatinine 1.79 (H) 0.6 - 1.3 MG/DL    BUN/Creatinine ratio 13 12 - 20      GFR est AA 46 (L) >60 ml/min/1.73m2    GFR est non-AA 38 (L) >60 ml/min/1.73m2    Calcium 8.0 (L) 8.5 - 10.1 MG/DL    Bilirubin, total 1.4 (H) 0.2 - 1.0 MG/DL    ALT (SGPT) 34 16 - 61 U/L    AST (SGOT) 27 10 - 38 U/L    Alk. phosphatase 98 45 - 117 U/L    Protein, total 7.2 6.4 - 8.2 g/dL    Albumin 2.5 (L) 3.4 - 5.0 g/dL    Globulin 4.7 (H) 2.0 - 4.0 g/dL    A-G Ratio 0.5 (L) 0.8 - 1.7     TROPONIN I    Collection Time: 07/27/20  6:47 AM   Result Value Ref Range    Troponin-I, QT <0.02 0.0 - 0.045 NG/ML   CBC WITH AUTOMATED DIFF    Collection Time: 07/27/20  6:47 AM   Result Value Ref Range    WBC 6.2 4.6 - 13.2 K/uL    RBC 3.36 (L) 4.70 - 5.50 M/uL    HGB 10.9 (L) 13.0 - 16.0 g/dL    HCT 31.3 (L) 36.0 - 48.0 %    MCV 93.2 74.0 - 97.0 FL    MCH 32.4 24.0 - 34.0 PG    MCHC 34.8 31.0 - 37.0 g/dL    RDW 13.0 11.6 - 14.5 %    PLATELET 414 546 - 251 K/uL    MPV 10.3 9.2 - 11.8 FL    NEUTROPHILS 60 40 - 73 %    LYMPHOCYTES 23 21 - 52 %    MONOCYTES 15 (H) 3 - 10 %    EOSINOPHILS 2 0 - 5 %    BASOPHILS 0 0 - 2 %    ABS. NEUTROPHILS 3.7 1.8 - 8.0 K/UL    ABS. LYMPHOCYTES 1.4 0.9 - 3.6 K/UL    ABS. MONOCYTES 1.0 0.05 - 1.2 K/UL    ABS. EOSINOPHILS 0.1 0.0 - 0.4 K/UL    ABS.  BASOPHILS 0.0 0.0 - 0.1 K/UL    DF AUTOMATED MAGNESIUM    Collection Time: 07/27/20  6:47 AM   Result Value Ref Range    Magnesium 1.2 (L) 1.6 - 2.6 mg/dL   PHOSPHORUS    Collection Time: 07/27/20  6:47 AM   Result Value Ref Range    Phosphorus 4.0 2.5 - 4.9 MG/DL   NT-PRO BNP    Collection Time: 07/27/20  6:47 AM   Result Value Ref Range    NT pro- 0 - 900 PG/ML   PROTHROMBIN TIME + INR    Collection Time: 07/27/20  6:47 AM   Result Value Ref Range    Prothrombin time 14.4 11.5 - 15.2 sec    INR 1.1 0.8 - 1.2         images and reports reviewed    Assessment:   Alison Ash is a 77 y.o. male who is very well-known to me. I have performed an exploratory laparotomy and extensive lysis of adhesions 10 days ago. The patient has an intraoperative finding that are rarely seen and I have discussed that with him. He had significant adhesions between the loop of the small bowel stem cells as well as the abdominal wall and the small bowels causing kinking. They look like [place]'s bands except that there were not connected to the duodenum. The the colon was normal.  After the surgery the patient did well and he was discharged home on the 21st.  He was tolerating regular diet and having normal bowel movement however now he has a picture of recurrence of his bowel obstruction. My first impression was this is probably an ileus however his CT scan shows a transition point between a dilated proximal bowel and the decompressed distal ileum which make me concerned that there is still some element of obstruction. Also his stomach is clearly distended on the CT scan and he has nausea and vomiting. When he presented the first time he had severe acute kidney injury and now he has a mild kidney injury most likely secondary to dehydration and I believe is going to worsen slightly because of the CT scan that was performed.        Plan:     I appreciate medicine admission and management  IV fluids  N.p.o.  Ambulation  DVT prophylaxis  NG tube  Daily labs including CBC and CMP  The patient's intraoperative findings are very unusual to me so I am going to order a small bowel series tomorrow to make sure that there is clear obstruction. If there is still a transition point unfortunately I believe the patient will need another surgical intervention. I also reviewed his pathology because he had diffuse lymphadenopathy in the first surgery and it came back as reactive with no evidence of malignancy because it was suspicion of a non-Hodgkin lymphoma. I will follow closely.     Please call me if you have any questions (cell phone: [phone])     Signed By: Sandra Dubose MD     July 27, 2020

## 2020-07-27 NOTE — ED NOTES
Nurse still has not called. This RN attempted to call her. [place] states nurse is on the phone with someone else and will call me in a minute.

## 2020-07-28 LAB
ANION GAP SERPL CALC-SCNC: 7 MMOL/L (ref 3–18)
BASOPHILS # BLD: 0 K/UL (ref 0–0.1)
BASOPHILS NFR BLD: 0 % (ref 0–2)
BUN SERPL-MCNC: 12 MG/DL (ref 7–18)
BUN/CREAT SERPL: 14 (ref 12–20)
CALCIUM SERPL-MCNC: 7.6 MG/DL (ref 8.5–10.1)
CHLORIDE SERPL-SCNC: 112 MMOL/L (ref 100–111)
CO2 SERPL-SCNC: 23 MMOL/L (ref 21–32)
CREAT SERPL-MCNC: 0.87 MG/DL (ref 0.6–1.3)
DIFFERENTIAL METHOD BLD: ABNORMAL
EOSINOPHIL # BLD: 0.1 K/UL (ref 0–0.4)
EOSINOPHIL NFR BLD: 1 % (ref 0–5)
ERYTHROCYTE [DISTWIDTH] IN BLOOD BY AUTOMATED COUNT: 13 % (ref 11.6–14.5)
GLUCOSE BLD STRIP.AUTO-MCNC: 104 MG/DL (ref 70–110)
GLUCOSE BLD STRIP.AUTO-MCNC: 124 MG/DL (ref 70–110)
GLUCOSE BLD STRIP.AUTO-MCNC: 178 MG/DL (ref 70–110)
GLUCOSE BLD STRIP.AUTO-MCNC: 61 MG/DL (ref 70–110)
GLUCOSE BLD STRIP.AUTO-MCNC: 73 MG/DL (ref 70–110)
GLUCOSE SERPL-MCNC: 127 MG/DL (ref 74–99)
HCT VFR BLD AUTO: 31.2 % (ref 36–48)
HGB BLD-MCNC: 10.6 G/DL (ref 13–16)
LYMPHOCYTES # BLD: 0.8 K/UL (ref 0.9–3.6)
LYMPHOCYTES NFR BLD: 14 % (ref 21–52)
MCH RBC QN AUTO: 31.7 PG (ref 24–34)
MCHC RBC AUTO-ENTMCNC: 34 G/DL (ref 31–37)
MCV RBC AUTO: 93.4 FL (ref 74–97)
MONOCYTES # BLD: 1 K/UL (ref 0.05–1.2)
MONOCYTES NFR BLD: 17 % (ref 3–10)
NEUTS SEG # BLD: 4 K/UL (ref 1.8–8)
NEUTS SEG NFR BLD: 68 % (ref 40–73)
PLATELET # BLD AUTO: 264 K/UL (ref 135–420)
PMV BLD AUTO: 10.4 FL (ref 9.2–11.8)
POTASSIUM SERPL-SCNC: 3.5 MMOL/L (ref 3.5–5.5)
RBC # BLD AUTO: 3.34 M/UL (ref 4.7–5.5)
SODIUM SERPL-SCNC: 142 MMOL/L (ref 136–145)
WBC # BLD AUTO: 5.9 K/UL (ref 4.6–13.2)

## 2020-07-28 PROCEDURE — 65270000029 HC RM PRIVATE

## 2020-07-28 PROCEDURE — 74011250636 HC RX REV CODE- 250/636: Performed by: PHYSICIAN ASSISTANT

## 2020-07-28 PROCEDURE — 82962 GLUCOSE BLOOD TEST: CPT

## 2020-07-28 PROCEDURE — 80048 BASIC METABOLIC PNL TOTAL CA: CPT

## 2020-07-28 PROCEDURE — 36415 COLL VENOUS BLD VENIPUNCTURE: CPT

## 2020-07-28 PROCEDURE — 74011636637 HC RX REV CODE- 636/637: Performed by: HOSPITALIST

## 2020-07-28 PROCEDURE — 85025 COMPLETE CBC W/AUTO DIFF WBC: CPT

## 2020-07-28 PROCEDURE — 74011250637 HC RX REV CODE- 250/637: Performed by: PHYSICIAN ASSISTANT

## 2020-07-28 RX ORDER — METOPROLOL TARTRATE 50 MG/1
50 TABLET ORAL 2 TIMES DAILY
Status: DISCONTINUED | OUTPATIENT
Start: 2020-07-28 | End: 2020-07-29 | Stop reason: HOSPADM

## 2020-07-28 RX ORDER — AMLODIPINE BESYLATE 10 MG/1
10 TABLET ORAL DAILY
Status: DISCONTINUED | OUTPATIENT
Start: 2020-07-29 | End: 2020-07-29 | Stop reason: HOSPADM

## 2020-07-28 RX ORDER — CLONIDINE HYDROCHLORIDE 0.1 MG/1
0.3 TABLET ORAL 2 TIMES DAILY
Status: DISCONTINUED | OUTPATIENT
Start: 2020-07-28 | End: 2020-07-29 | Stop reason: HOSPADM

## 2020-07-28 RX ADMIN — Medication 10 ML: at 17:18

## 2020-07-28 RX ADMIN — MORPHINE SULFATE 1 MG: 2 INJECTION, SOLUTION INTRAMUSCULAR; INTRAVENOUS at 02:36

## 2020-07-28 RX ADMIN — Medication 10 ML: at 05:24

## 2020-07-28 RX ADMIN — HYDRALAZINE HYDROCHLORIDE 10 MG: 20 INJECTION INTRAMUSCULAR; INTRAVENOUS at 12:35

## 2020-07-28 RX ADMIN — METOPROLOL TARTRATE 50 MG: 50 TABLET, FILM COATED ORAL at 17:17

## 2020-07-28 RX ADMIN — Medication 10 ML: at 22:10

## 2020-07-28 RX ADMIN — CLONIDINE HYDROCHLORIDE 0.3 MG: 0.1 TABLET ORAL at 17:17

## 2020-07-28 RX ADMIN — INSULIN LISPRO 2 UNITS: 100 INJECTION, SOLUTION INTRAVENOUS; SUBCUTANEOUS at 12:35

## 2020-07-28 RX ADMIN — MORPHINE SULFATE 1 MG: 2 INJECTION, SOLUTION INTRAMUSCULAR; INTRAVENOUS at 12:35

## 2020-07-28 RX ADMIN — MORPHINE SULFATE 1 MG: 2 INJECTION, SOLUTION INTRAMUSCULAR; INTRAVENOUS at 17:57

## 2020-07-28 RX ADMIN — HYDRALAZINE HYDROCHLORIDE 10 MG: 20 INJECTION INTRAMUSCULAR; INTRAVENOUS at 20:56

## 2020-07-28 NOTE — PROGRESS NOTES
Problem: Diabetes Self-Management  Goal: *Disease process and treatment process  Description: Define diabetes and identify own type of diabetes; list 3 options for treating diabetes. Outcome: Progressing Towards Goal  Goal: *Incorporating nutritional management into lifestyle  Description: Describe effect of type, amount and timing of food on blood glucose; list 3 methods for planning meals. Outcome: Progressing Towards Goal  Goal: *Incorporating physical activity into lifestyle  Description: State effect of exercise on blood glucose levels. Outcome: Progressing Towards Goal  Goal: *Developing strategies to promote health/change behavior  Description: Define the ABC's of diabetes; identify appropriate screenings, schedule and personal plan for screenings. Outcome: Progressing Towards Goal  Goal: *Using medications safely  Description: State effect of diabetes medications on diabetes; name diabetes medication taking, action and side effects. Outcome: Progressing Towards Goal  Goal: *Monitoring blood glucose, interpreting and using results  Description: Identify recommended blood glucose targets  and personal targets. Outcome: Progressing Towards Goal  Goal: *Prevention, detection, treatment of acute complications  Description: List symptoms of hyper- and hypoglycemia; describe how to treat low blood sugar and actions for lowering  high blood glucose level. Outcome: Progressing Towards Goal  Goal: *Prevention, detection and treatment of chronic complications  Description: Define the natural course of diabetes and describe the relationship of blood glucose levels to long term complications of diabetes.   Outcome: Progressing Towards Goal  Goal: *Developing strategies to address psychosocial issues  Description: Describe feelings about living with diabetes; identify support needed and support network  Outcome: Progressing Towards Goal  Goal: *Insulin pump training  Outcome: Progressing Towards Goal  Goal: *Sick day guidelines  Outcome: Progressing Towards Goal  Goal: *Patient Specific Goal (EDIT GOAL, INSERT TEXT)  Outcome: Progressing Towards Goal     Problem: Patient Education: Go to Patient Education Activity  Goal: Patient/Family Education  Outcome: Progressing Towards Goal     Problem: Falls - Risk of  Goal: *Absence of Falls  Description: Document Rubi Tang Fall Risk and appropriate interventions in the flowsheet.   Outcome: Progressing Towards Goal  Note: Fall Risk Interventions:            Medication Interventions: Patient to call before getting OOB                   Problem: Patient Education: Go to Patient Education Activity  Goal: Patient/Family Education  Outcome: Progressing Towards Goal     Problem: Pain  Goal: *Control of Pain  Outcome: Progressing Towards Goal  Goal: *PALLIATIVE CARE:  Alleviation of Pain  Outcome: Progressing Towards Goal     Problem: Patient Education: Go to Patient Education Activity  Goal: Patient/Family Education  Outcome: Progressing Towards Goal

## 2020-07-28 NOTE — PROGRESS NOTES
General Surgery Consult      Alison Lee Meter  Admit date: 2020    MRN: [id number]     : 1955     Age: 77 y.o. Attending Physician: Apolonio Leventhal, MD, FACS      Subjective:     Radha Roach is a 77 y.o. male who is very well-known to me and is being admitted for evaluation of possible small bowel obstruction versus ileus. Overnight the patient has been feeling great. He had 3 bowel movement which are very large and I saw 1 of them in the toilet. He also stated that his NG tube was pulled out by mistake but he feels way less distended and he feels hungry. His vital signs are normal with no fever and his heart rate is between 90 and 100. He is hypertensive. His WBC is normal and also his creatinine is back to normal today.      Patient Active Problem List    Diagnosis Date Noted    Diabetes ([address] 2020    SBO (small bowel obstruction) ([address] 2020    HTN (hypertension) 2020    PRADEEP (acute kidney injury) ([address] 2020    Hyponatremia 2020    Hyperglycemia 2020    Stage 3 acute kidney injury ([address] 2020    Community acquired pneumonia of right middle lobe of lung 2020    Former smoker 2020    BPH (benign prostatic hyperplasia) 2020    Frequency of micturition     Incomplete bladder emptying     Urgency of micturition     Hypogonadism in male 2017    Nocturia 2017    Urge incontinence 2017    Effusion of knee 2016    Erectile dysfunction 2016    Enlarged heart     Coronary artery disease     Musculoskeletal pain     Wrist joint pain     Arthritis     Benign hypertension     Impotence     Malignant hypertension 2013    Chest pain 2013    Syncope 2013    Essential hypertension 2013    Type II diabetes mellitus ([address] 2013     Past Medical History:   Diagnosis Date    Arthritis     Benign hypertension     Coronary artery disease     Diabetes mellitus ([address]     Enlarged heart     Erectile dysfunction     Frequency of micturition     Hypogonadism in male     Impotence     Incomplete bladder emptying     Musculoskeletal pain     Nocturia     Urgency of micturition     Wrist joint pain       Past Surgical History:   Procedure Laterality Date    HX CARPAL TUNNEL RELEASE      HX HERNIA REPAIR        Social History     Tobacco Use    Smoking status: Former Smoker     Packs/day: 0.50     Years: 5.00     Pack years: 2.50     Types: Cigarettes     Last attempt to quit: 1976     Years since quittin.6    Smokeless tobacco: Never Used   Substance Use Topics    Alcohol use: Yes     Comment: occassionally      Social History     Tobacco Use   Smoking Status Former Smoker    Packs/day: 0.50    Years: 5.00    Pack years: 2.50    Types: Cigarettes    Last attempt to quit: 1976    Years since quittin.7   Smokeless Tobacco Never Used     Family History   Problem Relation Age of Onset    Diabetes Mother     Hypertension Mother     Heart Failure Mother     High Cholesterol Mother     Stroke Mother     Other Mother         vision problems    Diabetes Father     Hypertension Father     Diabetes Sister     HIV/AIDS Sister     Hypertension Sister     HIV/AIDS Sister     No Known Problems Brother     HIV/AIDS Sister     Diabetes Maternal Uncle     Hypertension Maternal Uncle       Current Facility-Administered Medications   Medication Dose Route Frequency    sodium chloride (NS) flush 5-40 mL  5-40 mL IntraVENous Q8H    sodium chloride (NS) flush 5-40 mL  5-40 mL IntraVENous PRN    acetaminophen (TYLENOL) tablet 650 mg  650 mg Oral Q6H PRN    Or    acetaminophen (TYLENOL) suppository 650 mg  650 mg Rectal Q6H PRN    polyethylene glycol (MIRALAX) packet 17 g  17 g Oral DAILY PRN    ondansetron (ZOFRAN) injection 4 mg  4 mg IntraVENous Q6H PRN    insulin lispro (HUMALOG) injection   SubCUTAneous AC&HS    glucose chewable tablet 16 g  4 Tab Oral PRN    glucagon (GLUCAGEN) injection 1 mg  1 mg IntraMUSCular PRN    dextrose 10 % infusion 125-250 mL  125-250 mL IntraVENous PRN    morphine injection 1 mg  1 mg IntraVENous Q6H PRN    dextrose 5% and 0.9% NaCl infusion  75 mL/hr IntraVENous CONTINUOUS    naloxone (NARCAN) injection 0.4 mg  0.4 mg IntraVENous PRN    hydrALAZINE (APRESOLINE) 20 mg/mL injection 10 mg  10 mg IntraVENous Q6H PRN      Allergies   Allergen Reactions    Aspirin Anaphylaxis     Fatal    Lisinopril Cough        Review of Systems:  Pertinent items are noted in the History of Present Illness. Objective:     Visit Vitals  /83 (BP 1 Location: Left arm, BP Patient Position: At rest)   Pulse 91   Temp 98.8 °F (37.1 °C)   Resp 18   Wt 82.6 kg (182 lb)   SpO2 98%   BMI 26.11 kg/m²       Physical Exam:      General:  in no apparent distress, alert, oriented times 3, afebrile, normal vitals and cooperative                   Abdomen:   flat, soft, nontender, nondistended, no masses or organomegaly. Midline wound is clean.                  Imaging and Lab Review:     CBC:   Lab Results   Component Value Date/Time    WBC 5.9 07/28/2020 04:05 AM    RBC 3.34 (L) 07/28/2020 04:05 AM    HGB 10.6 (L) 07/28/2020 04:05 AM    HCT 31.2 (L) 07/28/2020 04:05 AM    PLATELET 484 52/55/8924 04:05 AM     BMP:   Lab Results   Component Value Date/Time    Glucose 127 (H) 07/28/2020 04:05 AM    Sodium 142 07/28/2020 04:05 AM    Potassium 3.5 07/28/2020 04:05 AM    Chloride 112 (H) 07/28/2020 04:05 AM    CO2 23 07/28/2020 04:05 AM    BUN 12 07/28/2020 04:05 AM    Creatinine 0.87 07/28/2020 04:05 AM    Calcium 7.6 (L) 07/28/2020 04:05 AM     CMP:  Lab Results   Component Value Date/Time    Glucose 127 (H) 07/28/2020 04:05 AM    Sodium 142 07/28/2020 04:05 AM    Potassium 3.5 07/28/2020 04:05 AM    Chloride 112 (H) 07/28/2020 04:05 AM    CO2 23 07/28/2020 04:05 AM    BUN 12 07/28/2020 04:05 AM    Creatinine 0.87 07/28/2020 04:05 AM    Calcium 7.6 (L) 07/28/2020 04:05 AM    Anion gap 7 07/28/2020 04:05 AM    BUN/Creatinine ratio 14 07/28/2020 04:05 AM    Alk. phosphatase 98 07/27/2020 06:47 AM    Protein, total 7.2 07/27/2020 06:47 AM    Albumin 2.5 (L) 07/27/2020 06:47 AM    Globulin 4.7 (H) 07/27/2020 06:47 AM    A-G Ratio 0.5 (L) 07/27/2020 06:47 AM       Recent Results (from the past 24 hour(s))   GLUCOSE, POC    Collection Time: 07/27/20  5:04 PM   Result Value Ref Range    Glucose (POC) 175 (H) 70 - 110 mg/dL   GLUCOSE, POC    Collection Time: 07/27/20  9:49 PM   Result Value Ref Range    Glucose (POC) 153 (H) 70 - 110 mg/dL   CBC WITH AUTOMATED DIFF    Collection Time: 07/28/20  4:05 AM   Result Value Ref Range    WBC 5.9 4.6 - 13.2 K/uL    RBC 3.34 (L) 4.70 - 5.50 M/uL    HGB 10.6 (L) 13.0 - 16.0 g/dL    HCT 31.2 (L) 36.0 - 48.0 %    MCV 93.4 74.0 - 97.0 FL    MCH 31.7 24.0 - 34.0 PG    MCHC 34.0 31.0 - 37.0 g/dL    RDW 13.0 11.6 - 14.5 %    PLATELET 817 491 - 481 K/uL    MPV 10.4 9.2 - 11.8 FL    NEUTROPHILS 68 40 - 73 %    LYMPHOCYTES 14 (L) 21 - 52 %    MONOCYTES 17 (H) 3 - 10 %    EOSINOPHILS 1 0 - 5 %    BASOPHILS 0 0 - 2 %    ABS. NEUTROPHILS 4.0 1.8 - 8.0 K/UL    ABS. LYMPHOCYTES 0.8 (L) 0.9 - 3.6 K/UL    ABS. MONOCYTES 1.0 0.05 - 1.2 K/UL    ABS. EOSINOPHILS 0.1 0.0 - 0.4 K/UL    ABS.  BASOPHILS 0.0 0.0 - 0.1 K/UL    DF AUTOMATED     METABOLIC PANEL, BASIC    Collection Time: 07/28/20  4:05 AM   Result Value Ref Range    Sodium 142 136 - 145 mmol/L    Potassium 3.5 3.5 - 5.5 mmol/L    Chloride 112 (H) 100 - 111 mmol/L    CO2 23 21 - 32 mmol/L    Anion gap 7 3.0 - 18 mmol/L    Glucose 127 (H) 74 - 99 mg/dL    BUN 12 7.0 - 18 MG/DL    Creatinine 0.87 0.6 - 1.3 MG/DL    BUN/Creatinine ratio 14 12 - 20      GFR est AA >60 >60 ml/min/1.73m2    GFR est non-AA >60 >60 ml/min/1.73m2    Calcium 7.6 (L) 8.5 - 10.1 MG/DL   GLUCOSE, POC    Collection Time: 07/28/20  7:20 AM   Result Value Ref Range    Glucose (POC) 124 (H) 70 - 110 mg/dL       images and reports reviewed    Assessment:   Alison Balderas is a 77 y.o. male who is very well-known to me who was admitted yesterday for nausea and abdominal distention and a picture of small bowel obstruction on CT scan. Luckily the patient is doing great and his small bowel obstruction versus ileus has resolved completely and he had multiple bowel movement and he is passing flatus. Also on exam his abdomen is way less distended and it is back to normal.  Also the patient vital signs are normal and his creatinine is back to normal as well. He looks good and he feels hungry. Based on these findings most likely the patient had an ileus and not a small bowel obstruction though an obstruction secondary to adhesion is still a possibility and this has resolved medically.      Plan:     Very appreciative of medicine admission and management  I placed an order for clear liquid diet and decrease IV fluids from 125 cc/h to 75 cc/h  Ambulation  Advance diet as tolerated  We will follow closely    Please call me if you have any questions (cell phone: [phone])     Signed By: Randa Fuller MD     July 28, 2020

## 2020-07-28 NOTE — PROGRESS NOTES
.2315 Bedside and Verbal shift change report given to RC RN  (oncoming nurse) by [facility] RN  (offgoing nurse). Report included the following information SBAR, Kardex, Intake/Output and MAR   .   1630 CNA reported pt blood sugar < 70, juice given per protocol    1700 rechecked  Blood sugar now 73 , dinner trays in    1720 Pain med given    1830 11 staples removed per order, tolerated well, pain resolved, bed locked, low, call bell in reach      1945 Bedside and Verbal shift change report [facility]  ([facility] nurse) by Gabino Willoughby RN  (offgoing nurse). Report included the following information SBAR, Kardex, Intake/Output and MAR.

## 2020-07-28 NOTE — PROGRESS NOTES
1900 Bedside, Verbal and Written shift change report given to 27 Angi Larsen (oncoming nurse) by Reuel Buerger RN (offgoing nurse). Report included the following information SBAR, Kardex, Intake/Output, MAR and Recent Results. 0000 Shift reassessment, pt condition unchanged, will continue to monitor. Patient had two brown loose stools. 0400  Shift reassessment, pt sleeping between care, will continue to monitor. 8752 Patient pulled out NGT.      0700 Bedside, Verbal and Written shift change report given to [address] [place] [address] (oncoming nurse) by Madyson Dahl RN (offgoing nurse). Report included the following information SBAR, Kardex, Intake/Output, MAR and Recent Results. Skin assessment completed. [id number] Dr. Giselle Kim in to see patient. New orders received to cancel small bowel series and not to re-insert NGT. Small bowel series discontinued.

## 2020-07-28 NOTE — PROGRESS NOTES
Comprehensive Nutrition Assessment    Type and Reason for Visit: Initial    Nutrition Recommendations/Plan:  1. Continue current diet: clear liquid advancing as tolerated to CC 2000 kcal diet   2. Monitor labs, weight and PO intake/tolerance        Nutrition Assessment: Admitted for SBO. Familiar with patient from recent admission; s/p Exploratory laparotomy, reduction of small bowel volvulus, lysis of mesenteric adhesions, and mesenteric lymph node biopsy on (7/17). General surgery following: NGT was placed to LIS but d/c this morning and diet advanced to clear liquids. Pt seen in room after breakfast; observed 40% of tray consumed and tolerating well. Encouraged to continue to slowly increase PO intake as tolerated with sips of liquid. Malnutrition Assessment:  Malnutrition Status:  No malnutrition      Nutrition History and Allergies: No beef or pork. Per documented weight Hx total weight loss ~20 lb or 10% since Oct 2019. Estimated Daily Nutrient Needs:  Energy (kcal):  2577-3349  Protein (g):  83-99       Fluid (ml/day):  1 mL/kcal    Nutrition Related Findings:  NGT d/c this morning and BM x 3 overnight. Denies any N/V and less ABD distention. Wounds:    Surgical wound       Current Nutrition Therapies:   DIET CLEAR LIQUID    Anthropometric Measures:  · Height:  5' 10\" (177.8 cm)  · Current Body Wt:  82.6 kg (182 lb 1.6 oz)   · Admission Body Wt:  182 lb 1.6 oz    · Usual Body Wt:  192 lb(until Dec 2019)     · Ideal Body Wt:  166:  109.7 %   · BMI Categories:  Overweight (BMI 25.0-29. 9)       Nutrition Diagnosis:   · Inadequate oral intake related to altered GI function as evidenced by NPO or clear liquid status due to medical condition    Nutrition Interventions:   Food and/or Nutrient Delivery: Modify current diet  Nutrition Education and Counseling: No recommendations at this time  Coordination of Nutrition Care: Continued inpatient monitoring    Goals:  PO nutrition intake will meet >75% of patient estimated nutritional needs within the next 7 days. Nutrition Monitoring and Evaluation:   Food/Nutrient Intake Outcomes: Diet advancement/tolerance, Food and nutrient intake, IVF intake  Physical Signs/Symptoms Outcomes: Biochemical data, GI status, Weight    Discharge Planning:     Too soon to determine     Damaso Bower, [facility]  Pager: [phone]

## 2020-07-28 NOTE — PROGRESS NOTES
Problem: Discharge Planning  Goal: *Discharge to safe environment  Outcome: Progressing Towards Goal  Plan is home   Reason for Readmission:    SBO vs Ilieus          RUR Score/Risk Level:   20%      PCP: First and Last name:  Dean Benites   Name of Practice:    Are you a current patient: Yes/No: yes   Approximate date of last visit: July 3rd   Can you participate in a virtual visit with your PCP: yes    Is a Care Conference indicated:  No ,P t went home on regular diet- there are no open issues with him. Did you attend your follow up appointment (s): If not, why not:  Appointment is not until July 30th. Resources/supports as identified by patient/family:          Top Challenges facing patient (as identified by patient/family and CM): Finances/Medication cost?  patient has [employer]  Transportation      His wife will drive him  Support system or lack thereof?     wife  Living arrangements? Lives with wife      Self-care/ADLs/Cognition? He is indendent with ADL. Te Angry He has a nice cane next to his bed that he decorated himself. Current Advanced Directive/Advance Care Plan: no and does not want to do one now. Plan for utilizing home health:   none             Transition of Care Plan:    Based on readmission, the patient's previous Plan of Care   has been evaluated and/or modified. The current Transition of Care Plan is:         Chart reviewed and pt verified demographics. He was dc'd from hospital last week but has now had abdominal distention, pain and no BM's recently. He was given ng tube for decompression/ suction and when it came out , it does not need to be replaced. Patient  Had 3 bm's yesterday with resolution of distention per note. He was advancing in his diet today- may be dc'd tomorrow. Plan is home, his wife to drive.       Patient has designated __wife______________________ to participate in his/her discharge plan and to receive any needed information. Name: Chris Emanuel  Address:  Phone [phone]    Care Management Interventions  PCP Verified by CM: Yes  Palliative Care Criteria Met (RRAT>21 & CHF Dx)?: No  Mode of Transport at Discharge: Other (see comment)(wife to drive)  Transition of Care Consult (CM Consult): Discharge Planning  Current Support Network: Lives with Spouse  Confirm Follow Up Transport: Family  The Plan for Transition of Care is Related to the Following Treatment Goals : Resolution of acute symptoms  Discharge Location  Discharge Placement: Home     Malinda Bah Courser, BSN  [facility]  [phone], Pager [phone]  [email or web address]

## 2020-07-28 NOTE — PROGRESS NOTES
Internal Medicine Progress Note    Patient's Name: Alison Valentin Date: 7/27/2020  Length of Stay: 1      Assessment/Plan     Principal Problem:    SBO (small bowel obstruction) ([facility] (7/27/2020)    Active Problems:    Diabetes ([facility] (7/27/2020)      HTN (hypertension) (7/27/2020)      PRADEEP (acute kidney injury) ([facility] (7/27/2020)      SBO  - appreciate GS  - advance diet  - bowel function returned  - conservative use of narcotics  - ambulate     PRADEEP  - d/c IV fluids  - resolved     DM  - ssi  - monitor q6     HTN  - resume home meds  - PRN hydralazine    - Cont acceptable home medications for chronic conditions   - DVT protocol    I have personally reviewed all pertinent labs and films that have officially resulted over the last 24 hours. I have personally checked for all pending labs that are awaiting final results. Anticipated discharge: tomorrow    HPI     Alison Ceron is a 77 y.o. male with a PMHx of HTN, CAD, DM who presented to the ED with c/o abdominal pain and constipation x2 days. He was recently hospitalized at [facility] and had ex lap with extensive ADALID and reduction of small bowel volvulus. He was discharged 1 week ago. Today in the ED, creatinine elevated at 1.79, Mg 1.2, WBC wnl. CT consistent with SBO with transition point in RLQ. NGT placed. GS consulted. Patient will be admitted for further evaluation and treatment. Hospital Course     Bowel function returned and pain resolved overnight. Diet advanced. Subjective     Pt s/e @ bedside. No major events overnight. Pt offers no complaints this AM. Denies CP or SOB. Denies abd pain, nvd.     Objective     Visit Vitals  BP (!) 185/95   Pulse 100   Temp 98.1 °F (36.7 °C)   Resp 20   Ht 5' 10\" (1.778 m)   Wt 82.6 kg (182 lb)   SpO2 98%   BMI 26.11 kg/m²       Physical Exam:  General Appearance: NAD, conversant  HENT: normocephalic/atraumatic, moist mucus membranes  Neck: No JVD, supple  Lungs: CTA with normal respiratory effort  CV: RRR, no m/r/g  Abdomen: soft, non-tender, normal bowel sounds  Extremities: no cyanosis, no peripheral edema  Neuro: No focal deficits, motor/sensory intact  Skin: Normal color, intact      Intake and Output:  Current Shift:  07/28 0701 - 07/28 1900  In: 480 [P.O.:480]  Out: 275 [Urine:275]  Last three shifts:  07/26 1901 - 07/28 0700  In: 1600 [I.V.:1600]  Out: 2500 [Urine:850]    Lab/Data Reviewed:  BMP:   Lab Results   Component Value Date/Time     07/28/2020 04:05 AM    K 3.5 07/28/2020 04:05 AM     (H) 07/28/2020 04:05 AM    CO2 23 07/28/2020 04:05 AM    AGAP 7 07/28/2020 04:05 AM     (H) 07/28/2020 04:05 AM    BUN 12 07/28/2020 04:05 AM    CREA 0.87 07/28/2020 04:05 AM    GFRAA >60 07/28/2020 04:05 AM    GFRNA >60 07/28/2020 04:05 AM     CBC:   Lab Results   Component Value Date/Time    WBC 5.9 07/28/2020 04:05 AM    HGB 10.6 (L) 07/28/2020 04:05 AM    HCT 31.2 (L) 07/28/2020 04:05 AM     07/28/2020 04:05 AM       Imaging Reviewed:  No results found.     Medications Reviewed:  Current Facility-Administered Medications   Medication Dose Route Frequency    [START ON 7/29/2020] amLODIPine (NORVASC) tablet 10 mg  10 mg Oral DAILY    cloNIDine HCL (CATAPRES) tablet 0.1 mg  0.1 mg Oral BID    metoprolol tartrate (LOPRESSOR) tablet 50 mg  50 mg Oral BID    sodium chloride (NS) flush 5-40 mL  5-40 mL IntraVENous Q8H    sodium chloride (NS) flush 5-40 mL  5-40 mL IntraVENous PRN    acetaminophen (TYLENOL) tablet 650 mg  650 mg Oral Q6H PRN    Or    acetaminophen (TYLENOL) suppository 650 mg  650 mg Rectal Q6H PRN    polyethylene glycol (MIRALAX) packet 17 g  17 g Oral DAILY PRN    ondansetron (ZOFRAN) injection 4 mg  4 mg IntraVENous Q6H PRN    insulin lispro (HUMALOG) injection   SubCUTAneous AC&HS    glucose chewable tablet 16 g  4 Tab Oral PRN    glucagon (GLUCAGEN) injection 1 mg  1 mg IntraMUSCular PRN    dextrose 10 % infusion 125-250 mL  125-250 mL IntraVENous PRN    morphine injection 1 mg  1 mg IntraVENous Q6H PRN    naloxone (NARCAN) injection 0.4 mg  0.4 mg IntraVENous PRN    hydrALAZINE (APRESOLINE) 20 mg/mL injection 10 mg  10 mg IntraVENous Q6H PRN         MODESTA [facility]  Office:  [phone]  Pager: [phone]

## 2020-07-29 VITALS
WEIGHT: 182 LBS | TEMPERATURE: 97.6 F | OXYGEN SATURATION: 100 % | BODY MASS INDEX: 26.05 KG/M2 | RESPIRATION RATE: 18 BRPM | SYSTOLIC BLOOD PRESSURE: 110 MMHG | DIASTOLIC BLOOD PRESSURE: 69 MMHG | HEIGHT: 70 IN | HEART RATE: 65 BPM

## 2020-07-29 LAB
GLUCOSE BLD STRIP.AUTO-MCNC: 151 MG/DL (ref 70–110)
GLUCOSE BLD STRIP.AUTO-MCNC: 99 MG/DL (ref 70–110)

## 2020-07-29 PROCEDURE — 82962 GLUCOSE BLOOD TEST: CPT

## 2020-07-29 PROCEDURE — 74011250637 HC RX REV CODE- 250/637: Performed by: PHYSICIAN ASSISTANT

## 2020-07-29 PROCEDURE — 74011250636 HC RX REV CODE- 250/636: Performed by: HOSPITALIST

## 2020-07-29 PROCEDURE — 74011250637 HC RX REV CODE- 250/637: Performed by: HOSPITALIST

## 2020-07-29 RX ORDER — HYDRALAZINE HYDROCHLORIDE 20 MG/ML
10 INJECTION INTRAMUSCULAR; INTRAVENOUS ONCE
Status: COMPLETED | OUTPATIENT
Start: 2020-07-29 | End: 2020-07-29

## 2020-07-29 RX ORDER — HYDROCODONE BITARTRATE AND ACETAMINOPHEN 5; 325 MG/1; MG/1
1 TABLET ORAL
Status: DISCONTINUED | OUTPATIENT
Start: 2020-07-29 | End: 2020-07-29 | Stop reason: HOSPADM

## 2020-07-29 RX ORDER — HYDRALAZINE HYDROCHLORIDE 50 MG/1
50 TABLET, FILM COATED ORAL 3 TIMES DAILY
Status: DISCONTINUED | OUTPATIENT
Start: 2020-07-29 | End: 2020-07-29 | Stop reason: HOSPADM

## 2020-07-29 RX ORDER — HYDRALAZINE HYDROCHLORIDE 20 MG/ML
10 INJECTION INTRAMUSCULAR; INTRAVENOUS
Status: DISCONTINUED | OUTPATIENT
Start: 2020-07-29 | End: 2020-07-29 | Stop reason: HOSPADM

## 2020-07-29 RX ORDER — CHLORTHALIDONE 25 MG/1
50 TABLET ORAL DAILY
Status: DISCONTINUED | OUTPATIENT
Start: 2020-07-29 | End: 2020-07-29 | Stop reason: HOSPADM

## 2020-07-29 RX ORDER — HYDRALAZINE HYDROCHLORIDE 50 MG/1
50 TABLET, FILM COATED ORAL 3 TIMES DAILY
Status: DISCONTINUED | OUTPATIENT
Start: 2020-07-29 | End: 2020-07-29

## 2020-07-29 RX ADMIN — HYDRALAZINE HYDROCHLORIDE 10 MG: 20 INJECTION INTRAMUSCULAR; INTRAVENOUS at 00:44

## 2020-07-29 RX ADMIN — AMLODIPINE BESYLATE 10 MG: 10 TABLET ORAL at 08:46

## 2020-07-29 RX ADMIN — Medication 10 ML: at 06:00

## 2020-07-29 RX ADMIN — CLONIDINE HYDROCHLORIDE 0.3 MG: 0.1 TABLET ORAL at 08:46

## 2020-07-29 RX ADMIN — METOPROLOL TARTRATE 50 MG: 50 TABLET, FILM COATED ORAL at 08:46

## 2020-07-29 RX ADMIN — HYDROCODONE BITARTRATE AND ACETAMINOPHEN 1 TABLET: 5; 325 TABLET ORAL at 01:22

## 2020-07-29 RX ADMIN — HYDRALAZINE HYDROCHLORIDE 50 MG: 50 TABLET, FILM COATED ORAL at 04:37

## 2020-07-29 RX ADMIN — CHLORTHALIDONE 50 MG: 25 TABLET ORAL at 04:37

## 2020-07-29 NOTE — ROUTINE PROCESS
1945 Bedside and Verbal shift change report given to Lui Castillo, RN (oncoming nurse) by Jewels Chen RN (offgoing nurse). Report included the following information SBAR, Kardex, Intake/Output and MAR  
 
2056 PRN hydralazine administered for SBP>180. 
 
0005 Charge nurse notified of SBP>180. See note. 0044 One time dose Hydralazine 10mg administered per MD order. 0359 SBP>180. Charge nurse and MD notified. MD placed new order for hydralazine and chlorthalidone. 6101 Hydralazine PO and chlorthalidone given. 4511 Bedside and Verbal shift change report given to Ethel Adams (oncoming nurse) by Lui Castillo RN (offgoing nurse). Report included the following information SBAR, Kardex, Intake/Output and MAR.

## 2020-07-29 NOTE — PROGRESS NOTES
Problem: Diabetes Self-Management  Goal: *Disease process and treatment process  Description: Define diabetes and identify own type of diabetes; list 3 options for treating diabetes. 7/29/2020 0339 by Flaco Varma  Outcome: Progressing Towards Goal  7/29/2020 0339 by Flaco Varma  Outcome: Progressing Towards Goal  Goal: *Incorporating nutritional management into lifestyle  Description: Describe effect of type, amount and timing of food on blood glucose; list 3 methods for planning meals. 7/29/2020 0339 by Flaco Varma  Outcome: Progressing Towards Goal  7/29/2020 0339 by Flaco Varma  Outcome: Progressing Towards Goal  Goal: *Incorporating physical activity into lifestyle  Description: State effect of exercise on blood glucose levels. 7/29/2020 0339 by Flaco Varma  Outcome: Progressing Towards Goal  7/29/2020 0339 by Flaco Varma  Outcome: Progressing Towards Goal  Goal: *Developing strategies to promote health/change behavior  Description: Define the ABC's of diabetes; identify appropriate screenings, schedule and personal plan for screenings. 7/29/2020 0339 by Flaco Varma  Outcome: Progressing Towards Goal  7/29/2020 0339 by Flaco Varma  Outcome: Progressing Towards Goal  Goal: *Using medications safely  Description: State effect of diabetes medications on diabetes; name diabetes medication taking, action and side effects. 7/29/2020 0339 by Flaco Varma  Outcome: Progressing Towards Goal  7/29/2020 0339 by Flcao Varma  Outcome: Progressing Towards Goal  Goal: *Monitoring blood glucose, interpreting and using results  Description: Identify recommended blood glucose targets  and personal targets.   7/29/2020 0339 by Flaco Varma  Outcome: Progressing Towards Goal  7/29/2020 0339 by Flaco Varma  Outcome: Progressing Towards Goal  Goal: *Prevention, detection, treatment of acute complications  Description: List symptoms of hyper- and hypoglycemia; describe how to treat low blood sugar and actions for lowering  high blood glucose level.  7/29/2020 0339 by Enrique Javed  Outcome: Progressing Towards Goal  7/29/2020 0339 by Enrique Javed  Outcome: Progressing Towards Goal  Goal: *Prevention, detection and treatment of chronic complications  Description: Define the natural course of diabetes and describe the relationship of blood glucose levels to long term complications of diabetes. 7/29/2020 0339 by Enrique Javed  Outcome: Progressing Towards Goal  7/29/2020 0339 by Enrique Javed  Outcome: Progressing Towards Goal  Goal: *Developing strategies to address psychosocial issues  Description: Describe feelings about living with diabetes; identify support needed and support network  7/29/2020 0339 by Enrique Javed  Outcome: Progressing Towards Goal  7/29/2020 0339 by Enrique Javed  Outcome: Progressing Towards Goal  Goal: *Insulin pump training  7/29/2020 0339 by Enrique Javed  Outcome: Progressing Towards Goal  7/29/2020 0339 by Enrique Javed  Outcome: Progressing Towards Goal  Goal: *Sick day guidelines  7/29/2020 0339 by Enrique Javed  Outcome: Progressing Towards Goal  7/29/2020 0339 by Enrique Javed  Outcome: Progressing Towards Goal  Goal: *Patient Specific Goal (EDIT GOAL, INSERT TEXT)  7/29/2020 0339 by Enrique Javed  Outcome: Progressing Towards Goal  7/29/2020 0339 by Enrique Javed  Outcome: Progressing Towards Goal     Problem: Patient Education: Go to Patient Education Activity  Goal: Patient/Family Education  7/29/2020 0339 by Enrique Javed  Outcome: Progressing Towards Goal  7/29/2020 0339 by Enrique Javed  Outcome: Progressing Towards Goal     Problem: Falls - Risk of  Goal: *Absence of Falls  Description: Document [place] Fall Risk and appropriate interventions in the flowsheet.   7/29/2020 0339 by Enrique Javed  Outcome: Progressing Towards Goal  Note: Fall Risk Interventions:            Medication Interventions: Patient to call before getting OOB         History of Falls Interventions: Evaluate medications/consider consulting pharmacy      7/29/2020 9066 by Nicho Styles Ly  Outcome: Progressing Towards Goal  Note: Fall Risk Interventions:            Medication Interventions: Patient to call before getting OOB         History of Falls Interventions: Evaluate medications/consider consulting pharmacy         Problem: Patient Education: Go to Patient Education Activity  Goal: Patient/Family Education  7/29/2020 0339 by Flaco Varma  Outcome: Progressing Towards Goal  7/29/2020 0339 by Flaco Varma  Outcome: Progressing Towards Goal     Problem: Pain  Goal: *Control of Pain  7/29/2020 0339 by Flaco Varma  Outcome: Progressing Towards Goal  7/29/2020 0339 by Flaco Varma  Outcome: Progressing Towards Goal  Goal: *PALLIATIVE CARE:  Alleviation of Pain  7/29/2020 0339 by Flaco Varma  Outcome: Progressing Towards Goal  7/29/2020 0339 by Flaco Varma  Outcome: Progressing Towards Goal     Problem: Patient Education: Go to Patient Education Activity  Goal: Patient/Family Education  7/29/2020 0339 by Flaco Varma  Outcome: Progressing Towards Goal  7/29/2020 0339 by Flaco Varma  Outcome: Progressing Towards Goal     Problem: Discharge Planning  Goal: *Discharge to safe environment  7/29/2020 0339 by Flaco Varma  Outcome: Progressing Towards Goal  7/29/2020 0339 by Flaco Varma  Outcome: Progressing Towards Goal     Problem: Nutrition Deficit  Goal: *Optimize nutritional status  7/29/2020 0339 by Flaco Varma  Outcome: Progressing Towards Goal  7/29/2020 0339 by Flaco Varma  Outcome: Progressing Towards Goal

## 2020-07-29 NOTE — PROGRESS NOTES
Problem: Discharge Planning  Goal: *Discharge to safe environment  Outcome: Progressing Towards Goal  Plan is home    Pt dc'd home,no services    Care Management Interventions  PCP Verified by CM: Yes  Palliative Care Criteria Met (RRAT>21 & CHF Dx)?: No  Mode of Transport at Discharge:  Other (see comment)(wife to drive)  Transition of Care Consult (CM Consult): Discharge Planning  Current Support Network: Lives with Spouse  Confirm Follow Up Transport: Family  The Plan for Transition of Care is Related to the Following Treatment Goals : Resolution of acute symptoms  Discharge Location  Discharge Placement: Home
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
## 2020-07-29 NOTE — PROGRESS NOTES
General Surgery Consult      Alison Mejía  Admit date: 2020    MRN: [id number]     : 1955     Age: 77 y.o. Attending Physician: Sophronia Bosworth, MD, FACS      Subjective:     Antonio Biggs is a 77 y.o. male who has been readmitted for abdominal pain and nausea and vomiting and possible picture of ileus versus bowel obstruction status post a laparotomy. He has been doing great over the last 24 hours and he has been passing flatus and having multiple bowel movement. He was placed on clear liquid diet yesterday morning and he has been tolerating it very well. He continues to pass flatus and have bowel movement and he feels hungry. His vital signs are normal with no fever or tachycardia but he has hypertension. There is no labs from today but yesterday his creatinine has normalized and he is making good urine output.      Patient Active Problem List    Diagnosis Date Noted    Diabetes ([address] 2020    SBO (small bowel obstruction) ([address] 2020    HTN (hypertension) 2020    PRADEEP (acute kidney injury) ([address] 2020    Hyponatremia 2020    Hyperglycemia 2020    Stage 3 acute kidney injury ([address] 2020    Community acquired pneumonia of right middle lobe of lung 2020    Former smoker 2020    BPH (benign prostatic hyperplasia) 2020    Frequency of micturition     Incomplete bladder emptying     Urgency of micturition     Hypogonadism in male 2017    Nocturia 2017    Urge incontinence 2017    Effusion of knee 2016    Erectile dysfunction 2016    Enlarged heart     Coronary artery disease     Musculoskeletal pain     Wrist joint pain     Arthritis     Benign hypertension     Impotence     Malignant hypertension 2013    Chest pain 2013    Syncope 2013    Essential hypertension 2013    Type II diabetes mellitus ([address] 2013     Past Medical History:   Diagnosis Date    Arthritis     Benign hypertension     Coronary artery disease     [facility]     Enlarged heart     Erectile dysfunction     Frequency of micturition     Hypogonadism in male     Impotence     Incomplete bladder emptying     Musculoskeletal pain     Nocturia     Urgency of micturition     Wrist joint pain       Past Surgical History:   Procedure Laterality Date    HX CARPAL TUNNEL RELEASE      HX HERNIA REPAIR        Social History     Tobacco Use    Smoking status: Former Smoker     Packs/day: 0.50     Years: 5.00     Pack years: 2.50     Types: Cigarettes     Last attempt to quit: 1976     Years since quittin.6    Smokeless tobacco: Never Used   Substance Use Topics    Alcohol use: Yes     Comment: occassionally      Social History     Tobacco Use   Smoking Status Former Smoker    Packs/day: 0.50    Years: 5.00    Pack years: 2.50    Types: Cigarettes    Last attempt to quit: 1976    Years since quittin.7   Smokeless Tobacco Never Used     Family History   Problem Relation Age of Onset    Diabetes Mother     Hypertension Mother     Heart Failure Mother     High Cholesterol Mother     Stroke Mother     Other Mother         vision problems    Diabetes Father     Hypertension Father     Diabetes Sister     HIV/AIDS Sister     Hypertension Sister     HIV/AIDS Sister     No Known Problems Brother     HIV/AIDS Sister     Diabetes Maternal Uncle     Hypertension Maternal Uncle       Current Facility-Administered Medications   Medication Dose Route Frequency    HYDROcodone-acetaminophen (NORCO) 5-325 mg per tablet 1 Tab  1 Tab Oral Q4H PRN    hydrALAZINE (APRESOLINE) tablet 50 mg  50 mg Oral TID    hydrALAZINE (APRESOLINE) 20 mg/mL injection 10 mg  10 mg IntraVENous Q4H PRN    chlorthalidone (HYGROTEN) tablet 50 mg  50 mg Oral DAILY    amLODIPine (NORVASC) tablet 10 mg  10 mg Oral DAILY    cloNIDine HCL (CATAPRES) tablet 0.3 mg  0.3 mg Oral BID  metoprolol tartrate (LOPRESSOR) tablet 50 mg  50 mg Oral BID    sodium chloride (NS) flush 5-40 mL  5-40 mL IntraVENous Q8H    sodium chloride (NS) flush 5-40 mL  5-40 mL IntraVENous PRN    acetaminophen (TYLENOL) tablet 650 mg  650 mg Oral Q6H PRN    Or    acetaminophen (TYLENOL) suppository 650 mg  650 mg Rectal Q6H PRN    polyethylene glycol (MIRALAX) packet 17 g  17 g Oral DAILY PRN    ondansetron (ZOFRAN) injection 4 mg  4 mg IntraVENous Q6H PRN    insulin lispro (HUMALOG) injection   SubCUTAneous AC&HS    glucose chewable tablet 16 g  4 Tab Oral PRN    glucagon (GLUCAGEN) injection 1 mg  1 mg IntraMUSCular PRN    dextrose 10 % infusion 125-250 mL  125-250 mL IntraVENous PRN    morphine injection 1 mg  1 mg IntraVENous Q6H PRN    naloxone (NARCAN) injection 0.4 mg  0.4 mg IntraVENous PRN      Allergies   Allergen Reactions    Aspirin Anaphylaxis     Fatal    Lisinopril Cough        Review of Systems:  Pertinent items are noted in the History of Present Illness. Objective:     Visit Vitals  /84 (BP 1 Location: Left arm, BP Patient Position: At rest)   Pulse 61   Temp 98.1 °F (36.7 °C)   Resp 18   Ht 5' 10\" (1.778 m)   Wt 82.6 kg (182 lb)   SpO2 99%   BMI 26.11 kg/m²       Physical Exam:      General:  in no apparent distress, alert, oriented times 3, afebrile and cooperative   Eyes:  conjunctivae and sclerae normal, pupils equal, round, reactive to light   Throat & Neck: no erythema or exudates noted and neck supple and symmetrical; no palpable masses   Lungs:   clear to auscultation bilaterally   Heart:  Regular rate and rhythm   Abdomen:   flat, soft, nontender, nondistended, no masses or organomegaly. Midline incision is healing well and the staples were removed.     Extremities: extremities normal, atraumatic, no cyanosis or edema   Skin: Normal.         Imaging and Lab Review:     CBC:   Lab Results   Component Value Date/Time    WBC 5.9 07/28/2020 04:05 AM    RBC 3.34 (L) 07/28/2020 04:05 AM    HGB 10.6 (L) 07/28/2020 04:05 AM    HCT 31.2 (L) 07/28/2020 04:05 AM    PLATELET 343 31/84/6805 04:05 AM     BMP:   Lab Results   Component Value Date/Time    Glucose 127 (H) 07/28/2020 04:05 AM    Sodium 142 07/28/2020 04:05 AM    Potassium 3.5 07/28/2020 04:05 AM    Chloride 112 (H) 07/28/2020 04:05 AM    CO2 23 07/28/2020 04:05 AM    BUN 12 07/28/2020 04:05 AM    Creatinine 0.87 07/28/2020 04:05 AM    Calcium 7.6 (L) 07/28/2020 04:05 AM     CMP:  Lab Results   Component Value Date/Time    Glucose 127 (H) 07/28/2020 04:05 AM    Sodium 142 07/28/2020 04:05 AM    Potassium 3.5 07/28/2020 04:05 AM    Chloride 112 (H) 07/28/2020 04:05 AM    CO2 23 07/28/2020 04:05 AM    BUN 12 07/28/2020 04:05 AM    Creatinine 0.87 07/28/2020 04:05 AM    Calcium 7.6 (L) 07/28/2020 04:05 AM    Anion gap 7 07/28/2020 04:05 AM    BUN/Creatinine ratio 14 07/28/2020 04:05 AM    Alk. phosphatase 98 07/27/2020 06:47 AM    Protein, total 7.2 07/27/2020 06:47 AM    Albumin 2.5 (L) 07/27/2020 06:47 AM    Globulin 4.7 (H) 07/27/2020 06:47 AM    A-G Ratio 0.5 (L) 07/27/2020 06:47 AM       Recent Results (from the past 24 hour(s))   GLUCOSE, POC    Collection Time: 07/28/20  7:20 AM   Result Value Ref Range    Glucose (POC) 124 (H) 70 - 110 mg/dL   GLUCOSE, POC    Collection Time: 07/28/20 11:17 AM   Result Value Ref Range    Glucose (POC) 178 (H) 70 - 110 mg/dL   GLUCOSE, POC    Collection Time: 07/28/20  4:31 PM   Result Value Ref Range    Glucose (POC) 61 (L) 70 - 110 mg/dL   GLUCOSE, POC    Collection Time: 07/28/20  4:46 PM   Result Value Ref Range    Glucose (POC) 73 70 - 110 mg/dL   GLUCOSE, POC    Collection Time: 07/28/20 10:07 PM   Result Value Ref Range    Glucose (POC) 104 70 - 110 mg/dL       images and reports reviewed    Assessment:   Alison Mandel is a 77 y.o. male is presenting with abdominal pain and a picture of either ileus or small bowel obstruction that has resolved completely.   Patient is tolerating clear liquid diet and he is passing flatus and having normal bowel movement. We also took his staples out yesterday. She also would like me to write him a letter because he has to report to the retirement on the weekend which I will ask my office to write it for him. He is supposed to see me tomorrow in the office but we will cancel this appointment and I will see him in couple weeks from now.       Plan:     I appreciate the medicine admission and management  Soft regular diet which I placed the order  Discharge planning if it is okay with the medicine team  Better management of his blood pressure with follow-up with his PCP  Follow-up with me in 2 weeks instead of the scheduled appointment tomorrow  We will ask my office to write him a letter to excuse him from reporting to the present on the week and because of his major surgery that he underwent   I wrote my discharge instructions    Please call me if you have any questions (cell phone: [phone])     Signed By: Lenell Moritz, MD     July 29, 2020

## 2020-07-29 NOTE — ROUTINE PROCESS
Noted that patient is on a full liquid diet. Spoke with physician. Verbal order for norco 5/325 mg one tab every four hours as needed for pain.  
 
Nu Gayle, RN, BSN

## 2020-07-29 NOTE — DISCHARGE INSTRUCTIONS
DISCHARGE SUMMARY from Nurse    PATIENT INSTRUCTIONS:    Patient {ARMBANDS:97629}    After general anesthesia or intravenous sedation, for 24 hours or while taking prescription Narcotics:  · Limit your activities  · Do not drive and operate hazardous machinery  · Do not make important personal or business decisions  · Do  not drink alcoholic beverages  · If you have not urinated within 8 hours after discharge, please contact your surgeon on call. Report the following to your surgeon:  · Excessive pain, swelling, redness or odor of or around the surgical area  · Temperature over 100.5  · Nausea and vomiting lasting longer than 4 hours or if unable to take medications  · Any signs of decreased circulation or nerve impairment to extremity: change in color, persistent  numbness, tingling, coldness or increase pain  · Any questions    What to do at Home:  Recommended activity: {discharge activity:03219}, as tolerated    If you experience any of the following symptoms nausea and vomiting notify doctor      *  Please give a list of your current medications to your Primary Care Provider. *  Please update this list whenever your medications are discontinued, doses are      changed, or new medications (including over-the-counter products) are added. *  Please carry medication information at all times in case of emergency situations. These are general instructions for a healthy lifestyle:    No smoking/ No tobacco products/ Avoid exposure to second hand smoke  Surgeon General's Warning:  Quitting smoking now greatly reduces serious risk to your health.     Obesity, smoking, and sedentary lifestyle greatly increases your risk for illness    A healthy diet, regular physical exercise & weight monitoring are important for maintaining a healthy lifestyle    You may be retaining fluid if you have a history of heart failure or if you experience any of the following symptoms:  Weight gain of 3 pounds or more overnight or 5 pounds in a week, increased swelling in our hands or feet or shortness of breath while lying flat in bed. Please call your doctor as soon as you notice any of these symptoms; do not wait until your next office visit. The discharge information has been reviewed with the {PATIENT PARENT GUARDIAN:69597}. The {PATIENT PARENT GUARDIAN:74242} verbalized understanding. Discharge medications reviewed with the {Dishcarge meds reviewed GIRL:69055} and appropriate educational materials and side effects teaching were provided. ___________________________________________________________________________________________________________________________________Discharge Instructions Following Surgery    Patient: Roosevelt Jackson MRN: [id number]  SSN: [id number]    YOB: 1955  Age: 77 y.o. Sex: male      Activity  · As tolerated, walking encourage, stairs are okay. · You may shower at home    Diet  · Soft diet for 2 weeks    Call your doctor if  · Excessive bleeding that does not stop after holding mild pressure over the area. · Temperature of 101 degrees F or above. · Redness,excessive swelling or bruising, and/or green or yellow, smelly discharge from incision. · If nausea and vomiting continues. Appointment date/time Follow-Up Phone Calls    · Call the office at [phone] to make your follow-up appointment in 2 weeks after the surgery (if not already set up) . Dr. Drew Hdez cell phone number is [phone]. Please call me if you have any concerns or questions.

## 2020-07-30 ENCOUNTER — PATIENT OUTREACH (OUTPATIENT)
Dept: CASE MANAGEMENT | Age: 65
End: 2020-07-30

## 2020-07-30 NOTE — PROGRESS NOTES
Patient contacted regarding recent discharge and COVID-19 risk. COVID-19 related testing which was not done at this time. Please review lab orders/results for additional verification. Care Transition Nurse/ Ambulatory Care Manager contacted the patient by telephone to perform post discharge assessment. Verified name and  with patient as identifiers. Patient has following risk factors of: diabetes. CTN/ACM reviewed discharge instructions, medical action plan and red flags related to discharge diagnosis. Reviewed and educated them on any new and changed medications related to discharge diagnosis. Advance Care Planning:   Does patient have an Advance Directive: not on file     Education provided regarding infection prevention, and signs and symptoms of COVID-19 and when to seek medical attention with patient who verbalized understanding. Discussed exposure protocols and quarantine from [address] you at higher risk for severe illness  and given an opportunity for questions and concerns. The patient agrees to contact the COVID-19 hotline [phone] or PCP office for questions related to their healthcare. CTN/ACM provided contact information for future reference. From CDC: Are you at higher risk for severe illness?  Wash your hands often.  Avoid close contact (6 feet, which is about two arm lengths) with people who are sick.  Put distance between yourself and other people if COVID-19 is spreading in your community.  Clean and disinfect frequently touched surfaces.  Avoid all cruise travel and non-essential air travel.  Call your healthcare professional if you have concerns about COVID-19 and your underlying condition or if you are sick.     For more information on steps you can take to protect yourself, see CDC's How to Protect Yourself      Patient/family/caregiver given information for Ronan White and agrees to enroll no     Patient reports:   - He is \" doing good\"     Plan for follow-up call in 7-14 days based on severity of symptoms and risk factors.

## 2020-08-27 ENCOUNTER — OFFICE VISIT (OUTPATIENT)
Dept: SURGERY | Age: 65
End: 2020-08-27

## 2020-08-27 VITALS
BODY MASS INDEX: 25.34 KG/M2 | DIASTOLIC BLOOD PRESSURE: 64 MMHG | TEMPERATURE: 98.3 F | OXYGEN SATURATION: 99 % | WEIGHT: 177 LBS | SYSTOLIC BLOOD PRESSURE: 106 MMHG | HEIGHT: 70 IN | HEART RATE: 78 BPM | RESPIRATION RATE: 18 BRPM

## 2020-08-27 DIAGNOSIS — Z09 POSTOPERATIVE EXAMINATION: Primary | ICD-10-CM

## 2020-08-27 NOTE — PROGRESS NOTES
Patient seen and examined. He is doing great. Tolerating regular diet and having normal bowel movement. His abdomen is soft and nontender and his midline wound is healing well.   Follow-up as needed

## 2020-09-21 ENCOUNTER — HOSPITAL ENCOUNTER (OUTPATIENT)
Dept: VASCULAR SURGERY | Age: 65
Discharge: HOME | End: 2020-09-21
Attending: PODIATRIST
Payer: MEDICAID

## 2020-09-21 DIAGNOSIS — L97.522 ULCER OF LEFT FOOT, WITH FAT LAYER EXPOSED (HCC): ICD-10-CM

## 2020-09-21 LAB
LEFT ABI: 1.63
LEFT ANTERIOR TIBIAL: 311 MMHG
LEFT ARM BP: 187 MMHG
LEFT ATA BP LEVEL: NORMAL
LEFT CALF PRESSURE: 317 MMHG
LEFT POSTERIOR TIBIAL: 311 MMHG
LEFT TBI: 0.46
LEFT TOE PRESSURE: 88 MMHG
RIGHT ABI: 1.63
RIGHT ANTERIOR TIBIAL: 312 MMHG
RIGHT ARM BP: 191 MMHG
RIGHT ATA BP LEVEL: NORMAL
RIGHT CALF PRESSURE: 303 MMHG
RIGHT POSTERIOR TIBIAL: 310 MMHG
RIGHT TBI: 0.41
RIGHT TOE PRESSURE: 79 MMHG

## 2020-09-21 PROCEDURE — 93923 UPR/LXTR ART STDY 3+ LVLS: CPT

## 2021-08-03 PROBLEM — I10 BENIGN HYPERTENSION: Status: RESOLVED | Noted: 2021-08-03 | Resolved: 2021-08-03

## 2021-08-03 PROBLEM — N17.9 AKI (ACUTE KIDNEY INJURY) (HCC): Status: RESOLVED | Noted: 2020-07-27 | Resolved: 2021-08-03

## 2021-08-03 PROBLEM — I10 HTN (HYPERTENSION): Status: RESOLVED | Noted: 2020-07-27 | Resolved: 2021-08-03

## 2021-08-04 NOTE — ROUTINE PROCESS
Bedside and Verbal shift change report given to Bijal York RN (oncoming nurse) by Michelle Vergara RN, BSN (offgoing nurse). Report included the following information SBAR, Kardex, ED Summary, OR Summary, Procedure Summary, Intake/Output, MAR, Recent Results and Cardiac Rhythm NSR.      Michelle Vergara RN, BSN .

## 2021-09-28 ENCOUNTER — HOSPITAL ENCOUNTER (OUTPATIENT)
Dept: LAB | Age: 66
Discharge: HOME | End: 2021-09-28
Payer: MEDICAID

## 2021-09-28 LAB
ALBUMIN SERPL-MCNC: 3.1 G/DL (ref 3.4–5)
ALBUMIN/GLOB SERPL: 0.7 {RATIO} (ref 0.8–1.7)
ALP SERPL-CCNC: 117 U/L (ref 45–117)
ALT SERPL-CCNC: 76 U/L (ref 16–61)
ANION GAP SERPL CALC-SCNC: 7 MMOL/L (ref 3–18)
AST SERPL-CCNC: 86 U/L (ref 10–38)
BASOPHILS # BLD: 0 K/UL (ref 0–0.1)
BASOPHILS NFR BLD: 0 % (ref 0–2)
BILIRUB SERPL-MCNC: 0.4 MG/DL (ref 0.2–1)
BUN SERPL-MCNC: 34 MG/DL (ref 7–18)
BUN/CREAT SERPL: 22 (ref 12–20)
CALCIUM SERPL-MCNC: 8.2 MG/DL (ref 8.5–10.1)
CHLORIDE SERPL-SCNC: 103 MMOL/L (ref 100–111)
CHOLEST SERPL-MCNC: 157 MG/DL
CO2 SERPL-SCNC: 24 MMOL/L (ref 21–32)
CREAT SERPL-MCNC: 1.54 MG/DL (ref 0.6–1.3)
DIFFERENTIAL METHOD BLD: ABNORMAL
EOSINOPHIL # BLD: 0.2 K/UL (ref 0–0.4)
EOSINOPHIL NFR BLD: 3 % (ref 0–5)
ERYTHROCYTE [DISTWIDTH] IN BLOOD BY AUTOMATED COUNT: 14.3 % (ref 11.6–14.5)
EST. AVERAGE GLUCOSE BLD GHB EST-MCNC: 183 MG/DL
GLOBULIN SER CALC-MCNC: 4.5 G/DL (ref 2–4)
GLUCOSE SERPL-MCNC: 422 MG/DL (ref 74–99)
HBA1C MFR BLD: 8 % (ref 4.2–5.6)
HCT VFR BLD AUTO: 36.9 % (ref 36–48)
HDLC SERPL-MCNC: 64 MG/DL (ref 40–60)
HDLC SERPL: 2.5 {RATIO} (ref 0–5)
HGB BLD-MCNC: 12.6 G/DL (ref 13–16)
LDLC SERPL CALC-MCNC: 26.4 MG/DL (ref 0–100)
LIPID PROFILE,FLP: ABNORMAL
LYMPHOCYTES # BLD: 1.4 K/UL (ref 0.9–3.6)
LYMPHOCYTES NFR BLD: 24 % (ref 21–52)
MCH RBC QN AUTO: 29.5 PG (ref 24–34)
MCHC RBC AUTO-ENTMCNC: 34.1 G/DL (ref 31–37)
MCV RBC AUTO: 86.4 FL (ref 78–100)
MONOCYTES # BLD: 0.8 K/UL (ref 0.05–1.2)
MONOCYTES NFR BLD: 14 % (ref 3–10)
NEUTS SEG # BLD: 3.3 K/UL (ref 1.8–8)
NEUTS SEG NFR BLD: 58 % (ref 40–73)
PLATELET # BLD AUTO: 144 K/UL (ref 135–420)
PMV BLD AUTO: 12.7 FL (ref 9.2–11.8)
POTASSIUM SERPL-SCNC: 4.1 MMOL/L (ref 3.5–5.5)
PROT SERPL-MCNC: 7.6 G/DL (ref 6.4–8.2)
RBC # BLD AUTO: 4.27 M/UL (ref 4.35–5.65)
SODIUM SERPL-SCNC: 134 MMOL/L (ref 136–145)
TRIGL SERPL-MCNC: 333 MG/DL (ref ?–150)
VLDLC SERPL CALC-MCNC: 66.6 MG/DL
WBC # BLD AUTO: 5.7 K/UL (ref 4.6–13.2)

## 2021-09-28 PROCEDURE — 80053 COMPREHEN METABOLIC PANEL: CPT

## 2021-09-28 PROCEDURE — 83036 HEMOGLOBIN GLYCOSYLATED A1C: CPT

## 2021-09-28 PROCEDURE — 80061 LIPID PANEL: CPT

## 2021-09-28 PROCEDURE — 80307 DRUG TEST PRSMV CHEM ANLYZR: CPT

## 2021-09-28 PROCEDURE — 85025 COMPLETE CBC W/AUTO DIFF WBC: CPT

## 2021-09-28 PROCEDURE — 36415 COLL VENOUS BLD VENIPUNCTURE: CPT

## 2022-03-18 PROBLEM — Z87.891 FORMER SMOKER: Status: ACTIVE | Noted: 2020-07-11

## 2022-03-18 PROBLEM — E11.9 DIABETES (HCC): Status: ACTIVE | Noted: 2020-07-27

## 2022-03-18 PROBLEM — R73.9 HYPERGLYCEMIA: Status: ACTIVE | Noted: 2020-07-11

## 2022-03-18 PROBLEM — E29.1 HYPOGONADISM IN MALE: Status: ACTIVE | Noted: 2017-03-01

## 2022-03-19 PROBLEM — R35.1 NOCTURIA: Status: ACTIVE | Noted: 2017-03-01

## 2022-03-19 PROBLEM — K56.609 SBO (SMALL BOWEL OBSTRUCTION) (HCC): Status: ACTIVE | Noted: 2020-07-27

## 2022-03-19 PROBLEM — J18.9 COMMUNITY ACQUIRED PNEUMONIA OF RIGHT MIDDLE LOBE OF LUNG: Status: ACTIVE | Noted: 2020-07-11

## 2022-03-19 PROBLEM — N39.41 URGE INCONTINENCE: Status: ACTIVE | Noted: 2017-03-01

## 2022-03-19 PROBLEM — N17.9 STAGE 3 ACUTE KIDNEY INJURY (HCC): Status: ACTIVE | Noted: 2020-07-11

## 2022-03-19 PROBLEM — N40.0 BPH (BENIGN PROSTATIC HYPERPLASIA): Status: ACTIVE | Noted: 2020-07-11

## 2022-03-19 PROBLEM — E87.1 HYPONATREMIA: Status: ACTIVE | Noted: 2020-07-11

## 2022-04-26 ENCOUNTER — HOSPITAL ENCOUNTER (OUTPATIENT)
Dept: LAB | Age: 67
Discharge: HOME | End: 2022-04-26
Payer: MEDICAID

## 2022-04-26 LAB — HBA1C MFR BLD: 8.5 % (ref 4.2–5.6)

## 2022-04-26 PROCEDURE — 83036 HEMOGLOBIN GLYCOSYLATED A1C: CPT

## 2022-04-26 PROCEDURE — 36415 COLL VENOUS BLD VENIPUNCTURE: CPT

## 2022-05-06 ENCOUNTER — HOSPITAL ENCOUNTER (OUTPATIENT)
Dept: LAB | Age: 67
Discharge: HOME | End: 2022-05-06
Payer: COMMERCIAL

## 2022-05-06 PROCEDURE — 36415 COLL VENOUS BLD VENIPUNCTURE: CPT

## 2022-05-06 PROCEDURE — 80307 DRUG TEST PRSMV CHEM ANLYZR: CPT

## 2023-01-10 NOTE — PATIENT INSTRUCTIONS
Erectile Dysfunction: Care Instructions Your Care Instructions A man has erectile dysfunction (ED) when he routinely can't get or keep an erection that allows satisfactory sex. He may not be able to have an erection at any time. Or he may not be able to have one that is firm enough or lasts long enough to complete intercourse. ED is not the same as having trouble getting an erection now and then. That's common. It happens to most men at some time. ED can be caused by problems with the blood vessels, nerves, or hormones. It can be caused by diabetes, heart disease, and injuries. Nerve disorders, such as multiple sclerosis or Parkinson's disease, can also cause it. ED can also be caused by medicines, alcohol, and tobacco. Or it may be caused by depression, stress, grief, or relationship problems. Follow-up care is a key part of your treatment and safety. Be sure to make and go to all appointments, and call your doctor if you are having problems. It's also a good idea to know your test results and keep a list of the medicines you take. How can you care for yourself at home? 
 Lifestyle 
  · Limit alcohol. Have no more than 2 drinks a day.  
  · Do not smoke. Smoking makes it harder for the blood vessels in the penis to relax and let blood flow in. If you need help quitting, talk to your doctor about stop-smoking programs and medicines. These can increase your chances of quitting for good.  
  · Do not use cocaine, heroin, or other illegal drugs.  
  · Try to reduce stress.  
  · Give yourself time to adjust to change. Changes in your job, family, relationships, home life, and other areas can cause stress. And stress can cause erection problems.  
 Work with your partner 
  · Don't assume that you know what your partner likes when it comes to sex. You may be wrong. Talk about what each of you does and does not enjoy.  
  · Make time outside of the bedroom to talk about your sex life.  If you Was able to connect with patient so that she could offer constructive feedback regarding her recent visit. Expressed appreciation for her perspective and assured her that we will use her feedback to make process improvements.   She states she is recovering avoid sex because you are afraid of having erection problems, your partner may worry that you are no longer interested.  
  · If you and your partner have trouble talking about sex, see a therapist who can help you talk about it. Reading books with your partner about sexual health may also help.  
  · Relax. Take time for more foreplay. Worrying about your erections may only make things worse. Medicines 
  · Tell your doctor about all the medicines that you take. ? Some medicines can cause erection problems. ? Some medicines can have dangerous interactions with medicines that are prescribed for ED, including over-the-counter medicines and herbal products.  
  · Be safe with medicines. Take your medicines exactly as prescribed. Call your doctor if you think you are having a problem with your medicine.  
  · Talk to your doctor about trying a medicine to help you keep an erection. This could be a medicine such as Viagra, Levitra, or Cialis. If you have a heart problem, ask your doctor if these are safe for you. Do not take these medicines if you take nitroglycerin or other nitrate medicine. When should you call for help? Call your doctor now or seek immediate medical care if: 
  · You took a medicine for erectile dysfunction and you have an erection that lasts longer than 3 hours.  
 Watch closely for changes in your health, and be sure to contact your doctor if you have any problems. Where can you learn more? Go to http://carlos-brionna.info/. Enter [id number] [phone] in the search box to learn more about \"Erectile Dysfunction: Care Instructions. \" Current as of: September 26, 2018 Content Version: 11.9 © 7069-5785 [employer]. Care instructions adapted under license by [employer] (which disclaims liability or warranty for this information).  If you have questions about a medical condition or this instruction, always ask your healthcare professional. [employer] disclaims any warranty or liability for your use of this information. done

## 2023-01-26 NOTE — ROUTINE PROCESS
Blood pressure 183/96. Notified physician that hydralazine was administered at 2056. Verbal order to give another 10 mg of hydralazine.  
 
Joseph Bob, RN, BSN 
 Quality 130: Documentation Of Current Medications In The Medical Record: Current Medications Documented Detail Level: Detailed

## 2023-10-05 ENCOUNTER — OFFICE VISIT (OUTPATIENT)
Age: 68
End: 2023-10-05
Payer: MEDICARE

## 2023-10-05 VITALS — BODY MASS INDEX: 27.63 KG/M2 | WEIGHT: 193 LBS | HEIGHT: 70 IN

## 2023-10-05 DIAGNOSIS — M25.562 CHRONIC PAIN OF LEFT KNEE: Primary | ICD-10-CM

## 2023-10-05 DIAGNOSIS — G89.29 CHRONIC PAIN OF LEFT KNEE: Primary | ICD-10-CM

## 2023-10-05 DIAGNOSIS — M17.12 PRIMARY OSTEOARTHRITIS OF LEFT KNEE: ICD-10-CM

## 2023-10-05 PROCEDURE — 73560 X-RAY EXAM OF KNEE 1 OR 2: CPT | Performed by: PHYSICIAN ASSISTANT

## 2023-10-05 PROCEDURE — 99203 OFFICE O/P NEW LOW 30 MIN: CPT | Performed by: PHYSICIAN ASSISTANT

## 2023-10-05 PROCEDURE — 1123F ACP DISCUSS/DSCN MKR DOCD: CPT | Performed by: PHYSICIAN ASSISTANT
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
## 2024-03-15 ENCOUNTER — OFFICE VISIT (OUTPATIENT)
Age: 69
End: 2024-03-15

## 2024-03-15 VITALS — WEIGHT: 186 LBS | HEIGHT: 70 IN | BODY MASS INDEX: 26.63 KG/M2

## 2024-03-15 DIAGNOSIS — M21.962 ACQUIRED DEFORMITY OF LEFT KNEE: ICD-10-CM

## 2024-03-15 DIAGNOSIS — E11.22 TYPE 2 DIABETES MELLITUS WITH STAGE 3A CHRONIC KIDNEY DISEASE, WITH LONG-TERM CURRENT USE OF INSULIN (HCC): ICD-10-CM

## 2024-03-15 DIAGNOSIS — M17.12 PRIMARY OSTEOARTHRITIS OF LEFT KNEE: Primary | ICD-10-CM

## 2024-03-15 DIAGNOSIS — N17.9 STAGE 3 ACUTE KIDNEY INJURY (HCC): ICD-10-CM

## 2024-03-15 DIAGNOSIS — I10 MALIGNANT HYPERTENSION: ICD-10-CM

## 2024-03-15 DIAGNOSIS — Z79.4 TYPE 2 DIABETES MELLITUS WITH STAGE 3A CHRONIC KIDNEY DISEASE, WITH LONG-TERM CURRENT USE OF INSULIN (HCC): ICD-10-CM

## 2024-03-15 DIAGNOSIS — Z01.818 PRE-OP TESTING: ICD-10-CM

## 2024-03-15 DIAGNOSIS — N18.31 TYPE 2 DIABETES MELLITUS WITH STAGE 3A CHRONIC KIDNEY DISEASE, WITH LONG-TERM CURRENT USE OF INSULIN (HCC): ICD-10-CM

## 2024-03-15 DIAGNOSIS — M17.11 PRIMARY OSTEOARTHRITIS OF RIGHT KNEE: ICD-10-CM
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
## 2024-10-18 ENCOUNTER — OFFICE VISIT (OUTPATIENT)
Age: 69
End: 2024-10-18
Payer: MEDICARE

## 2024-10-18 VITALS
SYSTOLIC BLOOD PRESSURE: 136 MMHG | DIASTOLIC BLOOD PRESSURE: 70 MMHG | TEMPERATURE: 97.3 F | OXYGEN SATURATION: 99 % | HEIGHT: 70 IN | WEIGHT: 183.8 LBS | HEART RATE: 79 BPM | BODY MASS INDEX: 26.31 KG/M2

## 2024-10-18 DIAGNOSIS — M17.0 PRIMARY OSTEOARTHRITIS OF BOTH KNEES: ICD-10-CM

## 2024-10-18 DIAGNOSIS — I73.9 CLAUDICATION (HCC): ICD-10-CM

## 2024-10-18 DIAGNOSIS — R06.02 EXERTIONAL SHORTNESS OF BREATH: ICD-10-CM

## 2024-10-18 DIAGNOSIS — R94.31 ABNORMAL ECG: ICD-10-CM

## 2024-10-18 DIAGNOSIS — R01.1 SYSTOLIC MURMUR: ICD-10-CM

## 2024-10-18 DIAGNOSIS — I51.89 DIASTOLIC DYSFUNCTION: ICD-10-CM

## 2024-10-18 DIAGNOSIS — I10 PRIMARY HYPERTENSION: ICD-10-CM

## 2024-10-18 DIAGNOSIS — E11.9 TYPE 2 DIABETES MELLITUS WITHOUT COMPLICATION, WITHOUT LONG-TERM CURRENT USE OF INSULIN (HCC): ICD-10-CM

## 2024-10-18 DIAGNOSIS — R60.0 BILATERAL LEG EDEMA: ICD-10-CM

## 2024-10-18 DIAGNOSIS — I27.20 PULMONARY HYPERTENSION (HCC): ICD-10-CM

## 2024-10-18 DIAGNOSIS — R55 SYNCOPE AND COLLAPSE: Primary | ICD-10-CM

## 2024-10-18 PROCEDURE — 93000 ELECTROCARDIOGRAM COMPLETE: CPT | Performed by: INTERNAL MEDICINE

## 2024-10-18 PROCEDURE — 3078F DIAST BP <80 MM HG: CPT | Performed by: INTERNAL MEDICINE

## 2024-10-18 PROCEDURE — 99215 OFFICE O/P EST HI 40 MIN: CPT | Performed by: INTERNAL MEDICINE

## 2024-10-18 PROCEDURE — 1123F ACP DISCUSS/DSCN MKR DOCD: CPT | Performed by: INTERNAL MEDICINE

## 2024-10-18 PROCEDURE — 3075F SYST BP GE 130 - 139MM HG: CPT | Performed by: INTERNAL MEDICINE

## 2024-10-18 RX ORDER — LOSARTAN POTASSIUM 100 MG/1
TABLET ORAL
COMMUNITY

## 2024-10-18 RX ORDER — GLIPIZIDE 10 MG/1
TABLET ORAL
COMMUNITY

## 2024-10-18 RX ORDER — PRENATAL VIT 91/IRON/FOLIC/DHA 28-975-200
COMBINATION PACKAGE (EA) ORAL
COMMUNITY

## 2024-10-18 RX ORDER — TRAZODONE HYDROCHLORIDE 100 MG/1
TABLET ORAL
COMMUNITY

## 2024-10-18 RX ORDER — TOBRAMYCIN 3 MG/ML
SOLUTION/ DROPS OPHTHALMIC
COMMUNITY

## 2024-10-18 RX ORDER — LISINOPRIL 20 MG/1
TABLET ORAL
COMMUNITY

## 2024-10-18 RX ORDER — PSEUDOEPHEDRINE HCL 30 MG
TABLET ORAL
COMMUNITY
Start: 2024-03-05

## 2024-10-18 RX ORDER — LISINOPRIL 30 MG/1
TABLET ORAL
COMMUNITY

## 2024-10-18 RX ORDER — CHLORHEXIDINE GLUCONATE ORAL RINSE 1.2 MG/ML
SOLUTION DENTAL
COMMUNITY

## 2024-10-18 RX ORDER — PENICILLIN V POTASSIUM 500 MG/1
TABLET, FILM COATED ORAL
COMMUNITY

## 2024-10-18 RX ORDER — LOSARTAN POTASSIUM 50 MG/1
TABLET ORAL
COMMUNITY

## 2024-10-18 RX ORDER — DEXAMETHASONE 2 MG/1
TABLET ORAL
COMMUNITY

## 2024-10-18 RX ORDER — PIOGLITAZONEHYDROCHLORIDE 30 MG/1
TABLET ORAL
COMMUNITY

## 2024-10-18 RX ORDER — LOSARTAN POTASSIUM AND HYDROCHLOROTHIAZIDE 25; 100 MG/1; MG/1
TABLET ORAL
COMMUNITY

## 2024-10-18 RX ORDER — ATORVASTATIN CALCIUM 40 MG/1
1 TABLET, FILM COATED ORAL
COMMUNITY
Start: 2024-04-09

## 2024-10-18 RX ORDER — KETOROLAC TROMETHAMINE 5 MG/ML
SOLUTION OPHTHALMIC
COMMUNITY

## 2024-10-18 RX ORDER — LORATADINE 10 MG/1
TABLET ORAL
COMMUNITY
Start: 2024-08-26

## 2024-10-18 RX ORDER — DOXYCYCLINE 100 MG/1
CAPSULE ORAL
COMMUNITY

## 2024-10-18 RX ORDER — PREDNISOLONE ACETATE 10 MG/ML
SUSPENSION/ DROPS OPHTHALMIC
COMMUNITY

## 2024-10-18 RX ORDER — LIDOCAINE 50 MG/G
PATCH TOPICAL
COMMUNITY
Start: 2024-08-26

## 2024-10-18 RX ORDER — METOCLOPRAMIDE 10 MG/1
TABLET ORAL
COMMUNITY

## 2024-10-18 RX ORDER — POLYETHYLENE GLYCOL 3350, SODIUM CHLORIDE, SODIUM BICARBONATE, POTASSIUM CHLORIDE 420; 11.2; 5.72; 1.48 G/4L; G/4L; G/4L; G/4L
POWDER, FOR SOLUTION ORAL
COMMUNITY

## 2024-10-18 RX ORDER — OXYCODONE HYDROCHLORIDE 5 MG/1
TABLET ORAL
COMMUNITY

## 2024-10-18 RX ORDER — BENZONATATE 100 MG/1
CAPSULE ORAL
COMMUNITY

## 2024-10-18 RX ORDER — HYDROCODONE BITARTRATE AND ACETAMINOPHEN 7.5; 325 MG/1; MG/1
TABLET ORAL
COMMUNITY

## 2024-10-18 RX ORDER — TRAMADOL HYDROCHLORIDE 50 MG/1
TABLET ORAL
COMMUNITY

## 2024-10-18 RX ORDER — CICLOPIROX OLAMINE 7.7 MG/G
CREAM TOPICAL
COMMUNITY

## 2024-10-18 RX ORDER — NAPROXEN 500 MG/1
TABLET ORAL
COMMUNITY

## 2024-10-18 RX ORDER — HYDROCHLOROTHIAZIDE 25 MG/1
TABLET ORAL
COMMUNITY

## 2024-10-18 RX ORDER — CARVEDILOL 3.12 MG/1
TABLET ORAL
COMMUNITY

## 2024-10-18 RX ORDER — FAMOTIDINE 40 MG/1
TABLET, FILM COATED ORAL
COMMUNITY

## 2024-10-18 RX ORDER — INSULIN DETEMIR 100 [IU]/ML
INJECTION, SOLUTION SUBCUTANEOUS
COMMUNITY

## 2024-10-18 RX ORDER — ALPROSTADIL 500 UG/ML
INJECTION, SOLUTION, CONCENTRATE INTRAVASCULAR
COMMUNITY

## 2024-10-18 RX ORDER — FLUOCINONIDE CREAM (EMULSIFIED BASE) 0.5 MG/G
CREAM TOPICAL
COMMUNITY

## 2024-10-18 RX ORDER — AMOXICILLIN 250 MG
2 CAPSULE ORAL 2 TIMES DAILY PRN
COMMUNITY
Start: 2024-04-09

## 2024-10-18 RX ORDER — ENOXAPARIN SODIUM 100 MG/ML
INJECTION SUBCUTANEOUS
COMMUNITY

## 2024-10-18 RX ORDER — CLINDAMYCIN HCL 150 MG
CAPSULE ORAL
COMMUNITY

## 2024-10-18 RX ORDER — AMLODIPINE BESYLATE 10 MG/1
TABLET ORAL
COMMUNITY

## 2024-10-18 RX ORDER — BENZONATATE 200 MG/1
CAPSULE ORAL
COMMUNITY

## 2024-10-18 RX ORDER — GABAPENTIN 300 MG/1
300 CAPSULE ORAL 2 TIMES DAILY
COMMUNITY
Start: 2024-03-05

## 2024-10-18 RX ORDER — RIVAROXABAN 10 MG/1
10 TABLET, FILM COATED ORAL DAILY
COMMUNITY
Start: 2024-04-10

## 2024-10-18 RX ORDER — MUPIROCIN 20 MG/G
OINTMENT TOPICAL
COMMUNITY
Start: 2024-08-26

## 2024-10-18 RX ORDER — CLOPIDOGREL BISULFATE 75 MG/1
TABLET ORAL
COMMUNITY
Start: 2024-04-10

## 2024-10-18 ASSESSMENT — ENCOUNTER SYMPTOMS
SHORTNESS OF BREATH: 1
ALLERGIC/IMMUNOLOGIC NEGATIVE: 1
EYES NEGATIVE: 1
GASTROINTESTINAL NEGATIVE: 1

## 2024-10-18 ASSESSMENT — PATIENT HEALTH QUESTIONNAIRE - PHQ9
SUM OF ALL RESPONSES TO PHQ QUESTIONS 1-9: 0
SUM OF ALL RESPONSES TO PHQ QUESTIONS 1-9: 0
SUM OF ALL RESPONSES TO PHQ9 QUESTIONS 1 & 2: 0
SUM OF ALL RESPONSES TO PHQ QUESTIONS 1-9: 0
1. LITTLE INTEREST OR PLEASURE IN DOING THINGS: NOT AT ALL
2. FEELING DOWN, DEPRESSED OR HOPELESS: NOT AT ALL
SUM OF ALL RESPONSES TO PHQ QUESTIONS 1-9: 0

## 2024-10-18 NOTE — PROGRESS NOTES
Jesus Alberto Malloy (:  1955) is a 69 y.o. male,Established patient, here for evaluation of the following chief complaint(s):  Follow-Up from Hospital    Subjective   SUBJECTIVE/OBJECTIVE:  History of Present Illness  The patient presents for evaluation of multiple medical concerns. He has a history of malignant hypertension, diabetes, and chronic kidney disease. Patient was recently hospitalized for what he describes as a syncopal spell    He is seeking readmission due to his diabetes, which has led to a foot injury. He reports numbness in his foot after wearing sneakers without socks, resulting in a twisted foot and skin damage on the top of his foot. His diabetes is currently uncontrolled, with an A1c level of 9 or 10. He is under the care of Dr. Shah for his diabetes management, who has prescribed medication and injections. He is interested in trying new medications to lower his A1c levels. He reports no blackout spells.    He is awaiting knee surgery, but it has been postponed due to his uncontrolled diabetes. He experiences shortness of breath and limited mobility due to his knee condition. He also suffers from back pain when standing for extended periods. He has not had an ultrasound of his legs.    He experienced a minor stroke 6 to 7 months ago, which resulted in a fall against a door. He was admitted to the hospital in 2024. He reports no chest pain or dizziness. He had a headache two days ago, which is unusual for him. His last EKG was performed during his previous visit.     I personally reviewed all available medical records, previous office notes, radiology reports and all available laboratory studies and procedural reports.    Past Medical History:   Diagnosis Date    Arthritis     Benign hypertension     Coronary artery disease     Diabetes mellitus (HCC)     Enlarged heart     Erectile dysfunction     Frequency of micturition     Hypogonadism in male     Impotence     Incomplete bladder 
1. \"Have you been to the ER, urgent care clinic since your last visit?  Hospitalized since your last visit?\" Reviewed by Dr. Porfirio Deluna    2. \"Have you seen or consulted any other health care providers outside of the [facility] since your last visit?\" Reviewed by Dr. Porfirio Deluna  
complains of pain/discomfort

## 2024-11-07 RX ORDER — BUMETANIDE 2 MG/1
2 TABLET ORAL DAILY
Qty: 90 TABLET | Refills: 3 | Status: SHIPPED | OUTPATIENT
Start: 2024-11-07

## 2025-05-22 DIAGNOSIS — R06.02 EXERTIONAL SHORTNESS OF BREATH: ICD-10-CM

## 2025-05-22 DIAGNOSIS — R01.1 SYSTOLIC MURMUR: ICD-10-CM

## 2025-05-22 DIAGNOSIS — I10 PRIMARY HYPERTENSION: ICD-10-CM

## 2025-05-22 DIAGNOSIS — I51.89 DIASTOLIC DYSFUNCTION: ICD-10-CM

## 2025-05-22 DIAGNOSIS — R55 SYNCOPE AND COLLAPSE: Primary | ICD-10-CM

## 2025-07-18 ENCOUNTER — OFFICE VISIT (OUTPATIENT)
Age: 70
End: 2025-07-18
Payer: MEDICARE

## 2025-07-18 DIAGNOSIS — E11.22 TYPE 2 DIABETES MELLITUS WITH STAGE 3A CHRONIC KIDNEY DISEASE, WITH LONG-TERM CURRENT USE OF INSULIN (HCC): ICD-10-CM

## 2025-07-18 DIAGNOSIS — M17.11 PRIMARY OSTEOARTHRITIS OF RIGHT KNEE: ICD-10-CM

## 2025-07-18 DIAGNOSIS — I10 MALIGNANT HYPERTENSION: ICD-10-CM

## 2025-07-18 DIAGNOSIS — N17.9 STAGE 3 ACUTE KIDNEY INJURY: ICD-10-CM

## 2025-07-18 DIAGNOSIS — N18.31 TYPE 2 DIABETES MELLITUS WITH STAGE 3A CHRONIC KIDNEY DISEASE, WITH LONG-TERM CURRENT USE OF INSULIN (HCC): ICD-10-CM

## 2025-07-18 DIAGNOSIS — Z01.818 PREOPERATIVE TESTING: ICD-10-CM

## 2025-07-18 DIAGNOSIS — Z79.4 TYPE 2 DIABETES MELLITUS WITH STAGE 3A CHRONIC KIDNEY DISEASE, WITH LONG-TERM CURRENT USE OF INSULIN (HCC): ICD-10-CM

## 2025-07-18 DIAGNOSIS — M17.12 PRIMARY OSTEOARTHRITIS OF LEFT KNEE: Primary | ICD-10-CM

## 2025-07-18 PROCEDURE — 1123F ACP DISCUSS/DSCN MKR DOCD: CPT | Performed by: ORTHOPAEDIC SURGERY

## 2025-07-18 PROCEDURE — 1125F AMNT PAIN NOTED PAIN PRSNT: CPT | Performed by: ORTHOPAEDIC SURGERY

## 2025-07-18 PROCEDURE — 99214 OFFICE O/P EST MOD 30 MIN: CPT | Performed by: ORTHOPAEDIC SURGERY

## 2025-07-18 NOTE — PROGRESS NOTES
thereafter.  It is imperative that we try to get both knees done because he has such a fixed deformity will be difficult for him to avoid a flexion contracture without doing both in rapid succession.         No data to display              Tobacco Use: Medium Risk (7/18/2025)    Patient History     Smoking Tobacco Use: Former     Smokeless Tobacco Use: Never     Passive Exposure: Not on file         Past Medical History:   Diagnosis Date    Arthritis     Benign hypertension     Coronary artery disease     Diabetes mellitus (HCC)     Enlarged heart     Erectile dysfunction     Frequency of micturition     Hypogonadism in male     Impotence     Incomplete bladder emptying     Musculoskeletal pain     Nocturia     Urgency of micturition     Wrist joint pain        Family History   Problem Relation Age of Onset    Hypertension Maternal Uncle     Diabetes Maternal Uncle     HIV/AIDS Sister     No Known Problems Brother     HIV/AIDS Sister     HIV/AIDS Sister     Diabetes Sister     Hypertension Father     Diabetes Father     Other Mother         vision problems    Stroke Mother     High Cholesterol Mother     Heart Failure Mother     Hypertension Mother     Diabetes Mother     Hypertension Sister        Current Outpatient Medications   Medication Sig Dispense Refill    bumetanide (BUMEX) 2 MG tablet Take 1 tablet by mouth daily ceived the following from [facility]: Outside name: bumetanide (BUMEX) 2 mg tablet 90 tablet 3    alprostadil (PROSTIN VR) 500 MCG/ML injection       amLODIPine (NORVASC) 10 MG tablet       ammonium lactate (LAC-HYDRIN) 5 % LOTN lotion Apply 1 application twice a day by topical route.      atorvastatin (LIPITOR) 40 MG tablet Take 1 tablet by mouth nightly      carvedilol (COREG) 3.125 MG tablet       chlorhexidine (PERIDEX) 0.12 % solution       ciclopirox (LOPROX) 0.77 % cream       clopidogrel (PLAVIX) 75 MG tablet       dexAMETHasone (DECADRON) 2 MG tablet       gabapentin

## (undated) DEVICE — SOLUTION IRRIG 1000ML H2O STRL BLT

## (undated) DEVICE — CURVED, LARGE JAW, OPEN SEALER/DIVIDER NANO-COATED: Brand: LIGASURE IMPACT

## (undated) DEVICE — SLEEVE COMPR THGH LEN MED TEAR AWAY GARM SCD EXPRESS [DVT30] [MEDTRONIC COVIDIEN KENDALL]

## (undated) DEVICE — SUTURE VCRL SZ 3-0 L27IN ABSRB UD L26MM SH 1/2 CIR J416H

## (undated) DEVICE — SUTURE PDS II SZ 1 L27IN ABSRB VLT CT-1 L36MM 1/2 CIR Z341H

## (undated) DEVICE — LIGHT HANDLE: Brand: DEVON

## (undated) DEVICE — ELECTRODE BLDE L4IN NONINSULATED EDGE

## (undated) DEVICE — DEPAUL MAJOR PROCEDURE PACK: Brand: MEDLINE INDUSTRIES, INC.

## (undated) DEVICE — DRAPE THER FLUID WARMING 66X44 IN FLAT SLUSH DBL DISC ORS

## (undated) DEVICE — STAPLER SKIN H3.9MM WIRE DIA0.58MM CRWN 6.9MM 35 STPL FIX

## (undated) DEVICE — SOL IRR NACL 0.9% 500ML POUR --

## (undated) DEVICE — STERILE POLYISOPRENE POWDER-FREE SURGICAL GLOVES: Brand: PROTEXIS

## (undated) DEVICE — SPONGE GZ W4XL4IN COT 12 PLY TYP VII WVN C FLD DSGN

## (undated) DEVICE — INSULATED BLADE ELECTRODE: Brand: EDGE

## (undated) DEVICE — INTENDED FOR TISSUE SEPARATION, AND OTHER PROCEDURES THAT REQUIRE A SHARP SURGICAL BLADE TO PUNCTURE OR CUT.: Brand: BARD-PARKER ® CARBON RIB-BACK BLADES

## (undated) DEVICE — TOWEL SURG W16XL26IN BLU NONFENESTRATED DLX ST 2 PER PK

## (undated) DEVICE — PREP SKN CHLRAPRP 26ML TNT -- CONVERT TO ITEM 373320

## (undated) DEVICE — SUTURE VCRL SZ 2-0 L27IN ABSRB UD L26MM SH 1/2 CIR J417H